# Patient Record
Sex: MALE | Race: BLACK OR AFRICAN AMERICAN | Employment: OTHER | URBAN - METROPOLITAN AREA
[De-identification: names, ages, dates, MRNs, and addresses within clinical notes are randomized per-mention and may not be internally consistent; named-entity substitution may affect disease eponyms.]

---

## 2023-08-10 ENCOUNTER — HOSPITAL ENCOUNTER (INPATIENT)
Facility: HOSPITAL | Age: 68
LOS: 18 days | DRG: 326 | End: 2023-08-28
Attending: STUDENT IN AN ORGANIZED HEALTH CARE EDUCATION/TRAINING PROGRAM | Admitting: FAMILY MEDICINE
Payer: MEDICARE

## 2023-08-10 ENCOUNTER — APPOINTMENT (OUTPATIENT)
Facility: HOSPITAL | Age: 68
End: 2023-08-10

## 2023-08-10 ENCOUNTER — HOSPITAL ENCOUNTER (EMERGENCY)
Facility: HOSPITAL | Age: 68
Discharge: ANOTHER HOSPITAL | End: 2023-08-10
Attending: EMERGENCY MEDICINE

## 2023-08-10 VITALS
WEIGHT: 110 LBS | BODY MASS INDEX: 14.9 KG/M2 | TEMPERATURE: 97.6 F | DIASTOLIC BLOOD PRESSURE: 74 MMHG | HEART RATE: 34 BPM | HEIGHT: 72 IN | SYSTOLIC BLOOD PRESSURE: 97 MMHG | OXYGEN SATURATION: 100 % | RESPIRATION RATE: 10 BRPM

## 2023-08-10 DIAGNOSIS — K29.60: Primary | ICD-10-CM

## 2023-08-10 DIAGNOSIS — E87.6 HYPOKALEMIA: ICD-10-CM

## 2023-08-10 LAB
ALBUMIN SERPL-MCNC: 2.7 G/DL (ref 3.5–5)
ALBUMIN/GLOB SERPL: 0.9 (ref 1.1–2.2)
ALP SERPL-CCNC: 62 U/L (ref 45–117)
ALT SERPL-CCNC: 43 U/L (ref 12–78)
ANION GAP SERPL CALC-SCNC: 6 MMOL/L (ref 5–15)
APPEARANCE UR: ABNORMAL
AST SERPL W P-5'-P-CCNC: 55 U/L (ref 15–37)
BACTERIA URNS QL MICRO: ABNORMAL /HPF
BASOPHILS # BLD: 0 K/UL (ref 0–0.1)
BASOPHILS NFR BLD: 0 % (ref 0–1)
BILIRUB SERPL-MCNC: 0.8 MG/DL (ref 0.2–1)
BILIRUB UR QL: NEGATIVE
BUN SERPL-MCNC: 56 MG/DL (ref 6–20)
BUN/CREAT SERPL: 57 (ref 12–20)
CA-I BLD-MCNC: 8.6 MG/DL (ref 8.5–10.1)
CHLORIDE SERPL-SCNC: 96 MMOL/L (ref 97–108)
CO2 SERPL-SCNC: 40 MMOL/L (ref 21–32)
COLOR UR: ABNORMAL
CREAT SERPL-MCNC: 0.99 MG/DL (ref 0.7–1.3)
DIFFERENTIAL METHOD BLD: ABNORMAL
EKG ATRIAL RATE: 45 BPM
EKG DIAGNOSIS: NORMAL
EKG P AXIS: 91 DEGREES
EKG P-R INTERVAL: 220 MS
EKG Q-T INTERVAL: 560 MS
EKG QRS DURATION: 166 MS
EKG QTC CALCULATION (BAZETT): 484 MS
EKG R AXIS: -63 DEGREES
EKG T AXIS: 76 DEGREES
EKG VENTRICULAR RATE: 45 BPM
EOSINOPHIL # BLD: 0 K/UL (ref 0–0.4)
EOSINOPHIL NFR BLD: 0 % (ref 0–7)
EPITH CASTS URNS QL MICRO: ABNORMAL /LPF
ERYTHROCYTE [DISTWIDTH] IN BLOOD BY AUTOMATED COUNT: 13.3 % (ref 11.5–14.5)
GLOBULIN SER CALC-MCNC: 3.1 G/DL (ref 2–4)
GLUCOSE SERPL-MCNC: 119 MG/DL (ref 65–100)
GLUCOSE UR STRIP.AUTO-MCNC: NEGATIVE MG/DL
HCT VFR BLD AUTO: 33.2 % (ref 36.6–50.3)
HGB BLD-MCNC: 11.5 G/DL (ref 12.1–17)
HGB UR QL STRIP: NEGATIVE
IMM GRANULOCYTES # BLD AUTO: 0 K/UL (ref 0–0.04)
IMM GRANULOCYTES NFR BLD AUTO: 1 % (ref 0–0.5)
KETONES UR QL STRIP.AUTO: NEGATIVE MG/DL
LEUKOCYTE ESTERASE UR QL STRIP.AUTO: NEGATIVE
LYMPHOCYTES # BLD: 1 K/UL (ref 0.8–3.5)
LYMPHOCYTES NFR BLD: 33 % (ref 12–49)
MAGNESIUM SERPL-MCNC: 2.3 MG/DL (ref 1.6–2.4)
MCH RBC QN AUTO: 36.1 PG (ref 26–34)
MCHC RBC AUTO-ENTMCNC: 34.6 G/DL (ref 30–36.5)
MCV RBC AUTO: 104.1 FL (ref 80–99)
MONOCYTES # BLD: 0.2 K/UL (ref 0–1)
MONOCYTES NFR BLD: 7 % (ref 5–13)
MUCOUS THREADS URNS QL MICRO: ABNORMAL /LPF
NEUTS SEG # BLD: 1.7 K/UL (ref 1.8–8)
NEUTS SEG NFR BLD: 59 % (ref 32–75)
NITRITE UR QL STRIP.AUTO: NEGATIVE
NRBC # BLD: 0 K/UL (ref 0–0.01)
NRBC BLD-RTO: 0 PER 100 WBC
OTHER, URINE: PRESENT
PH UR STRIP: 8 (ref 5–8)
PHOSPHATE SERPL-MCNC: 2.4 MG/DL (ref 2.6–4.7)
PLATELET # BLD AUTO: 180 K/UL (ref 150–400)
PMV BLD AUTO: 10.6 FL (ref 8.9–12.9)
POTASSIUM SERPL-SCNC: 2.2 MMOL/L (ref 3.5–5.1)
PROT SERPL-MCNC: 5.8 G/DL (ref 6.4–8.2)
PROT UR STRIP-MCNC: 100 MG/DL
RBC # BLD AUTO: 3.19 M/UL (ref 4.1–5.7)
RBC #/AREA URNS HPF: ABNORMAL /HPF (ref 0–5)
SARS-COV-2 RDRP RESP QL NAA+PROBE: NOT DETECTED
SODIUM SERPL-SCNC: 142 MMOL/L (ref 136–145)
SP GR UR REFRACTOMETRY: 1.02 (ref 1–1.03)
TROPONIN I SERPL HS-MCNC: 26 NG/L (ref 0–76)
URINE CULTURE IF INDICATED: ABNORMAL
UROBILINOGEN UR QL STRIP.AUTO: 0.1 EU/DL (ref 0.1–1)
WBC # BLD AUTO: 2.9 K/UL (ref 4.1–11.1)
WBC CASTS URNS QL MICRO: PRESENT
WBC URNS QL MICRO: ABNORMAL /HPF (ref 0–4)
YEAST URNS QL MICRO: PRESENT

## 2023-08-10 PROCEDURE — 84100 ASSAY OF PHOSPHORUS: CPT

## 2023-08-10 PROCEDURE — 6360000002 HC RX W HCPCS: Performed by: EMERGENCY MEDICINE

## 2023-08-10 PROCEDURE — 6360000004 HC RX CONTRAST MEDICATION: Performed by: EMERGENCY MEDICINE

## 2023-08-10 PROCEDURE — 96375 TX/PRO/DX INJ NEW DRUG ADDON: CPT

## 2023-08-10 PROCEDURE — 87086 URINE CULTURE/COLONY COUNT: CPT

## 2023-08-10 PROCEDURE — 80053 COMPREHEN METABOLIC PANEL: CPT

## 2023-08-10 PROCEDURE — 2060000000 HC ICU INTERMEDIATE R&B

## 2023-08-10 PROCEDURE — 81001 URINALYSIS AUTO W/SCOPE: CPT

## 2023-08-10 PROCEDURE — 96367 TX/PROPH/DG ADDL SEQ IV INF: CPT

## 2023-08-10 PROCEDURE — 2580000003 HC RX 258: Performed by: EMERGENCY MEDICINE

## 2023-08-10 PROCEDURE — 83735 ASSAY OF MAGNESIUM: CPT

## 2023-08-10 PROCEDURE — 85025 COMPLETE CBC W/AUTO DIFF WBC: CPT

## 2023-08-10 PROCEDURE — 84484 ASSAY OF TROPONIN QUANT: CPT

## 2023-08-10 PROCEDURE — 96365 THER/PROPH/DIAG IV INF INIT: CPT

## 2023-08-10 PROCEDURE — 6370000000 HC RX 637 (ALT 250 FOR IP): Performed by: EMERGENCY MEDICINE

## 2023-08-10 PROCEDURE — 71045 X-RAY EXAM CHEST 1 VIEW: CPT

## 2023-08-10 PROCEDURE — 99285 EMERGENCY DEPT VISIT HI MDM: CPT

## 2023-08-10 PROCEDURE — 96366 THER/PROPH/DIAG IV INF ADDON: CPT

## 2023-08-10 PROCEDURE — 87635 SARS-COV-2 COVID-19 AMP PRB: CPT

## 2023-08-10 PROCEDURE — 71260 CT THORAX DX C+: CPT

## 2023-08-10 PROCEDURE — 93005 ELECTROCARDIOGRAM TRACING: CPT | Performed by: EMERGENCY MEDICINE

## 2023-08-10 PROCEDURE — 36415 COLL VENOUS BLD VENIPUNCTURE: CPT

## 2023-08-10 RX ORDER — FENTANYL CITRATE 50 UG/ML
25 INJECTION, SOLUTION INTRAMUSCULAR; INTRAVENOUS ONCE
Status: COMPLETED | OUTPATIENT
Start: 2023-08-10 | End: 2023-08-10

## 2023-08-10 RX ORDER — KETOROLAC TROMETHAMINE 15 MG/ML
15 INJECTION, SOLUTION INTRAMUSCULAR; INTRAVENOUS
Status: COMPLETED | OUTPATIENT
Start: 2023-08-10 | End: 2023-08-10

## 2023-08-10 RX ORDER — ONDANSETRON 2 MG/ML
4 INJECTION INTRAMUSCULAR; INTRAVENOUS ONCE
Status: COMPLETED | OUTPATIENT
Start: 2023-08-10 | End: 2023-08-10

## 2023-08-10 RX ORDER — ONDANSETRON 2 MG/ML
4 INJECTION INTRAMUSCULAR; INTRAVENOUS ONCE
Status: DISCONTINUED | OUTPATIENT
Start: 2023-08-10 | End: 2023-08-10

## 2023-08-10 RX ORDER — PROCHLORPERAZINE EDISYLATE 5 MG/ML
10 INJECTION INTRAMUSCULAR; INTRAVENOUS
Status: COMPLETED | OUTPATIENT
Start: 2023-08-10 | End: 2023-08-10

## 2023-08-10 RX ORDER — 0.9 % SODIUM CHLORIDE 0.9 %
1000 INTRAVENOUS SOLUTION INTRAVENOUS ONCE
Status: COMPLETED | OUTPATIENT
Start: 2023-08-10 | End: 2023-08-10

## 2023-08-10 RX ORDER — POTASSIUM CHLORIDE 7.45 MG/ML
10 INJECTION INTRAVENOUS
Status: ACTIVE | OUTPATIENT
Start: 2023-08-10 | End: 2023-08-10

## 2023-08-10 RX ORDER — SODIUM CHLORIDE, SODIUM LACTATE, POTASSIUM CHLORIDE, AND CALCIUM CHLORIDE .6; .31; .03; .02 G/100ML; G/100ML; G/100ML; G/100ML
1000 INJECTION, SOLUTION INTRAVENOUS
Status: COMPLETED | OUTPATIENT
Start: 2023-08-10 | End: 2023-08-10

## 2023-08-10 RX ORDER — POTASSIUM CHLORIDE 750 MG/1
40 TABLET, FILM COATED, EXTENDED RELEASE ORAL ONCE
Status: COMPLETED | OUTPATIENT
Start: 2023-08-10 | End: 2023-08-10

## 2023-08-10 RX ADMIN — SODIUM CHLORIDE 1000 ML: 9 INJECTION, SOLUTION INTRAVENOUS at 10:34

## 2023-08-10 RX ADMIN — KETOROLAC TROMETHAMINE 15 MG: 15 INJECTION, SOLUTION INTRAMUSCULAR; INTRAVENOUS at 12:42

## 2023-08-10 RX ADMIN — VANCOMYCIN HYDROCHLORIDE 1250 MG: 1.25 INJECTION, POWDER, LYOPHILIZED, FOR SOLUTION INTRAVENOUS at 17:20

## 2023-08-10 RX ADMIN — ONDANSETRON 4 MG: 2 INJECTION INTRAMUSCULAR; INTRAVENOUS at 12:42

## 2023-08-10 RX ADMIN — POTASSIUM CHLORIDE 10 MEQ: 7.45 INJECTION INTRAVENOUS at 19:10

## 2023-08-10 RX ADMIN — PROCHLORPERAZINE EDISYLATE 10 MG: 5 INJECTION INTRAMUSCULAR; INTRAVENOUS at 14:34

## 2023-08-10 RX ADMIN — SODIUM CHLORIDE, POTASSIUM CHLORIDE, SODIUM LACTATE AND CALCIUM CHLORIDE 1000 ML: 600; 310; 30; 20 INJECTION, SOLUTION INTRAVENOUS at 12:42

## 2023-08-10 RX ADMIN — POTASSIUM CHLORIDE 10 MEQ: 7.45 INJECTION INTRAVENOUS at 16:55

## 2023-08-10 RX ADMIN — POTASSIUM CHLORIDE 10 MEQ: 7.45 INJECTION INTRAVENOUS at 14:40

## 2023-08-10 RX ADMIN — POTASSIUM CHLORIDE 10 MEQ: 7.45 INJECTION INTRAVENOUS at 20:26

## 2023-08-10 RX ADMIN — PIPERACILLIN AND TAZOBACTAM 4500 MG: 4; .5 INJECTION, POWDER, LYOPHILIZED, FOR SOLUTION INTRAVENOUS at 16:06

## 2023-08-10 RX ADMIN — FENTANYL CITRATE 25 MCG: 50 INJECTION, SOLUTION INTRAMUSCULAR; INTRAVENOUS at 19:15

## 2023-08-10 RX ADMIN — POTASSIUM CHLORIDE 40 MEQ: 750 TABLET, EXTENDED RELEASE ORAL at 14:38

## 2023-08-10 RX ADMIN — IOPAMIDOL 100 ML: 755 INJECTION, SOLUTION INTRAVENOUS at 14:26

## 2023-08-10 RX ADMIN — POTASSIUM CHLORIDE 10 MEQ: 7.45 INJECTION INTRAVENOUS at 15:45

## 2023-08-10 ASSESSMENT — PAIN SCALES - GENERAL
PAINLEVEL_OUTOF10: 3
PAINLEVEL_OUTOF10: 0

## 2023-08-10 ASSESSMENT — PAIN - FUNCTIONAL ASSESSMENT: PAIN_FUNCTIONAL_ASSESSMENT: 0-10

## 2023-08-10 ASSESSMENT — LIFESTYLE VARIABLES
HOW OFTEN DO YOU HAVE A DRINK CONTAINING ALCOHOL: NEVER
HOW MANY STANDARD DRINKS CONTAINING ALCOHOL DO YOU HAVE ON A TYPICAL DAY: PATIENT DOES NOT DRINK

## 2023-08-10 NOTE — ED NOTES
NG tube placed at this time. Secured to the nose.  Marked 64 at the nose     Kristal Briones RN  08/10/23 1817       Kristal Briones RN  08/10/23 9698

## 2023-08-10 NOTE — ED TRIAGE NOTES
Pt complaining of vomiting that started about a month ago. Patient family states he hasn't been able to eat or drink anything for a month.  [facility]

## 2023-08-10 NOTE — ED PROVIDER NOTES
Teagan Burt MD        sodium chloride flush 0.9 % injection 5-40 mL  5-40 mL IntraVENous CARLOS Castañeda MD        0.9 % sodium chloride infusion   IntraVENous CARLOS Castañeda MD        ondansetron (ZOFRAN-ODT) disintegrating tablet 4 mg  4 mg Oral Q8H PRN Bhumi Castañeda MD        Or    ondansetron [facility]) injection 4 mg  4 mg IntraVENous Q6H PRN Bhumi Castañeda MD        polyethylene glycol (GLYCOLAX) packet 17 g  17 g Oral Daily PRN Bhumi Castañeda MD        acetaminophen (TYLENOL) tablet 650 mg  650 mg Oral Q6H PRN Bhumi Castañeda MD        Or    acetaminophen (TYLENOL) suppository 650 mg  650 mg Rectal Q6H PRN Bhumi Castañeda MD        HYDROmorphone HCl PF (DILAUDID) injection 0.5 mg  0.5 mg IntraVENous Q4H PRN Bhumi MD Garry        naloxone Miri Horace) injection 0.4 mg  0.4 mg IntraVENous PRN Bhumi MD Garry           Social Determinants of Health:   Social Determinants of Health     Tobacco Use: Not on file   Alcohol Use: Not At Risk    Frequency of Alcohol Consumption: Never    Average Number of Drinks: Patient does not drink    Frequency of Binge Drinking: Never   Financial Resource Strain: Not on file   Food Insecurity: Not on file   Transportation Needs: Not on file   Physical Activity: Not on file   Stress: Not on file   Social Connections: Not on file   Intimate Partner Violence: Not on file   Depression: Not on file   Housing Stability: Not on file       PHYSICAL EXAM   Physical Exam  Constitutional:       Appearance: He is cachectic. He is ill-appearing. HENT:      Head: Normocephalic and atraumatic. Right Ear: External ear normal.      Left Ear: External ear normal.      Nose: Nose normal.      Mouth/Throat:      Mouth: Mucous membranes are moist.   Eyes:      Extraocular Movements: Extraocular movements intact. Pupils: Pupils are equal, round, and reactive to light. Cardiovascular:      Rate and Rhythm: Normal rate and regular rhythm. Pulses: Normal pulses.       Heart

## 2023-08-11 PROBLEM — K29.60: Status: ACTIVE | Noted: 2023-08-11

## 2023-08-11 LAB
ANION GAP SERPL CALC-SCNC: 5 MMOL/L (ref 5–15)
BASOPHILS # BLD: 0 K/UL (ref 0–0.1)
BASOPHILS NFR BLD: 0 % (ref 0–1)
BUN SERPL-MCNC: 42 MG/DL (ref 6–20)
BUN/CREAT SERPL: 54 (ref 12–20)
CALCIUM SERPL-MCNC: 8.2 MG/DL (ref 8.5–10.1)
CHLORIDE SERPL-SCNC: 102 MMOL/L (ref 97–108)
CO2 SERPL-SCNC: 35 MMOL/L (ref 21–32)
CREAT SERPL-MCNC: 0.78 MG/DL (ref 0.7–1.3)
DIFFERENTIAL METHOD BLD: ABNORMAL
EOSINOPHIL # BLD: 0 K/UL (ref 0–0.4)
EOSINOPHIL NFR BLD: 0 % (ref 0–7)
ERYTHROCYTE [DISTWIDTH] IN BLOOD BY AUTOMATED COUNT: 13.4 % (ref 11.5–14.5)
GLUCOSE SERPL-MCNC: 86 MG/DL (ref 65–100)
HCT VFR BLD AUTO: 33.7 % (ref 36.6–50.3)
HGB BLD-MCNC: 11.5 G/DL (ref 12.1–17)
IMM GRANULOCYTES # BLD AUTO: 0 K/UL (ref 0–0.04)
IMM GRANULOCYTES NFR BLD AUTO: 1 % (ref 0–0.5)
LACTATE SERPL-SCNC: 2.4 MMOL/L (ref 0.4–2)
LYMPHOCYTES # BLD: 0.9 K/UL (ref 0.8–3.5)
LYMPHOCYTES NFR BLD: 27 % (ref 12–49)
MAGNESIUM SERPL-MCNC: 2.1 MG/DL (ref 1.6–2.4)
MCH RBC QN AUTO: 35.8 PG (ref 26–34)
MCHC RBC AUTO-ENTMCNC: 34.1 G/DL (ref 30–36.5)
MCV RBC AUTO: 105 FL (ref 80–99)
MONOCYTES # BLD: 0.3 K/UL (ref 0–1)
MONOCYTES NFR BLD: 8 % (ref 5–13)
NEUTS SEG # BLD: 2.2 K/UL (ref 1.8–8)
NEUTS SEG NFR BLD: 64 % (ref 32–75)
NRBC # BLD: 0 K/UL (ref 0–0.01)
NRBC BLD-RTO: 0 PER 100 WBC
PLATELET # BLD AUTO: 162 K/UL (ref 150–400)
PMV BLD AUTO: 10.3 FL (ref 8.9–12.9)
POTASSIUM SERPL-SCNC: 2.9 MMOL/L (ref 3.5–5.1)
RBC # BLD AUTO: 3.21 M/UL (ref 4.1–5.7)
SODIUM SERPL-SCNC: 142 MMOL/L (ref 136–145)
WBC # BLD AUTO: 3.4 K/UL (ref 4.1–11.1)

## 2023-08-11 PROCEDURE — 6360000002 HC RX W HCPCS: Performed by: INTERNAL MEDICINE

## 2023-08-11 PROCEDURE — 85025 COMPLETE CBC W/AUTO DIFF WBC: CPT

## 2023-08-11 PROCEDURE — 2580000003 HC RX 258: Performed by: PHYSICIAN ASSISTANT

## 2023-08-11 PROCEDURE — 2060000000 HC ICU INTERMEDIATE R&B

## 2023-08-11 PROCEDURE — 6360000002 HC RX W HCPCS: Performed by: PHYSICIAN ASSISTANT

## 2023-08-11 PROCEDURE — 6360000002 HC RX W HCPCS: Performed by: FAMILY MEDICINE

## 2023-08-11 PROCEDURE — APPSS30 APP SPLIT SHARED TIME 16-30 MINUTES: Performed by: PHYSICIAN ASSISTANT

## 2023-08-11 PROCEDURE — 80048 BASIC METABOLIC PNL TOTAL CA: CPT

## 2023-08-11 PROCEDURE — 99221 1ST HOSP IP/OBS SF/LOW 40: CPT | Performed by: SURGERY

## 2023-08-11 PROCEDURE — 6360000002 HC RX W HCPCS: Performed by: SURGERY

## 2023-08-11 PROCEDURE — 83735 ASSAY OF MAGNESIUM: CPT

## 2023-08-11 PROCEDURE — 36415 COLL VENOUS BLD VENIPUNCTURE: CPT

## 2023-08-11 PROCEDURE — C9113 INJ PANTOPRAZOLE SODIUM, VIA: HCPCS | Performed by: PHYSICIAN ASSISTANT

## 2023-08-11 PROCEDURE — 2580000003 HC RX 258: Performed by: INTERNAL MEDICINE

## 2023-08-11 PROCEDURE — A4216 STERILE WATER/SALINE, 10 ML: HCPCS | Performed by: PHYSICIAN ASSISTANT

## 2023-08-11 PROCEDURE — 83605 ASSAY OF LACTIC ACID: CPT

## 2023-08-11 PROCEDURE — 2580000003 HC RX 258: Performed by: FAMILY MEDICINE

## 2023-08-11 RX ORDER — TADALAFIL 10 MG/1
10 TABLET ORAL PRN
COMMUNITY

## 2023-08-11 RX ORDER — ONDANSETRON 4 MG/1
4 TABLET, ORALLY DISINTEGRATING ORAL EVERY 8 HOURS PRN
Status: DISCONTINUED | OUTPATIENT
Start: 2023-08-11 | End: 2023-08-13

## 2023-08-11 RX ORDER — AMLODIPINE BESYLATE 10 MG/1
10 TABLET ORAL DAILY
COMMUNITY

## 2023-08-11 RX ORDER — ACETAMINOPHEN 650 MG/1
650 SUPPOSITORY RECTAL EVERY 6 HOURS PRN
Status: DISCONTINUED | OUTPATIENT
Start: 2023-08-11 | End: 2023-08-13

## 2023-08-11 RX ORDER — SODIUM CHLORIDE 0.9 % (FLUSH) 0.9 %
5-40 SYRINGE (ML) INJECTION EVERY 12 HOURS SCHEDULED
Status: DISCONTINUED | OUTPATIENT
Start: 2023-08-11 | End: 2023-08-28 | Stop reason: HOSPADM

## 2023-08-11 RX ORDER — SIMETHICONE 80 MG
80 TABLET,CHEWABLE ORAL EVERY 6 HOURS PRN
COMMUNITY

## 2023-08-11 RX ORDER — ACETAMINOPHEN 325 MG/1
650 TABLET ORAL EVERY 6 HOURS PRN
Status: DISCONTINUED | OUTPATIENT
Start: 2023-08-11 | End: 2023-08-13

## 2023-08-11 RX ORDER — POLYETHYLENE GLYCOL 3350 17 G/17G
17 POWDER, FOR SOLUTION ORAL DAILY PRN
Status: DISCONTINUED | OUTPATIENT
Start: 2023-08-11 | End: 2023-08-23

## 2023-08-11 RX ORDER — SODIUM CHLORIDE 0.9 % (FLUSH) 0.9 %
5-40 SYRINGE (ML) INJECTION PRN
Status: DISCONTINUED | OUTPATIENT
Start: 2023-08-11 | End: 2023-08-28 | Stop reason: HOSPADM

## 2023-08-11 RX ORDER — HYDROMORPHONE HYDROCHLORIDE 1 MG/ML
1 INJECTION, SOLUTION INTRAMUSCULAR; INTRAVENOUS; SUBCUTANEOUS
Status: DISCONTINUED | OUTPATIENT
Start: 2023-08-11 | End: 2023-08-23

## 2023-08-11 RX ORDER — SODIUM CHLORIDE AND POTASSIUM CHLORIDE 300; 900 MG/100ML; MG/100ML
INJECTION, SOLUTION INTRAVENOUS CONTINUOUS
Status: DISCONTINUED | OUTPATIENT
Start: 2023-08-11 | End: 2023-08-13

## 2023-08-11 RX ORDER — NALOXONE HYDROCHLORIDE 0.4 MG/ML
0.4 INJECTION, SOLUTION INTRAMUSCULAR; INTRAVENOUS; SUBCUTANEOUS PRN
Status: DISCONTINUED | OUTPATIENT
Start: 2023-08-11 | End: 2023-08-28 | Stop reason: HOSPADM

## 2023-08-11 RX ORDER — HYDROMORPHONE HYDROCHLORIDE 1 MG/ML
1 INJECTION, SOLUTION INTRAMUSCULAR; INTRAVENOUS; SUBCUTANEOUS EVERY 4 HOURS PRN
Status: DISCONTINUED | OUTPATIENT
Start: 2023-08-11 | End: 2023-08-11

## 2023-08-11 RX ORDER — TAMSULOSIN HYDROCHLORIDE 0.4 MG/1
0.4 CAPSULE ORAL 2 TIMES DAILY
COMMUNITY

## 2023-08-11 RX ORDER — ONDANSETRON 2 MG/ML
4 INJECTION INTRAMUSCULAR; INTRAVENOUS EVERY 6 HOURS PRN
Status: DISCONTINUED | OUTPATIENT
Start: 2023-08-11 | End: 2023-08-13

## 2023-08-11 RX ORDER — SODIUM CHLORIDE 9 MG/ML
INJECTION, SOLUTION INTRAVENOUS PRN
Status: DISCONTINUED | OUTPATIENT
Start: 2023-08-11 | End: 2023-08-13 | Stop reason: SDUPTHER

## 2023-08-11 RX ORDER — HYDROMORPHONE HYDROCHLORIDE 1 MG/ML
0.5 INJECTION, SOLUTION INTRAMUSCULAR; INTRAVENOUS; SUBCUTANEOUS EVERY 4 HOURS PRN
Status: DISCONTINUED | OUTPATIENT
Start: 2023-08-11 | End: 2023-08-11

## 2023-08-11 RX ORDER — PANTOPRAZOLE SODIUM 40 MG/1
40 TABLET, DELAYED RELEASE ORAL DAILY
COMMUNITY

## 2023-08-11 RX ADMIN — PANTOPRAZOLE SODIUM 40 MG: 40 INJECTION, POWDER, LYOPHILIZED, FOR SOLUTION INTRAVENOUS at 23:42

## 2023-08-11 RX ADMIN — HYDROMORPHONE HYDROCHLORIDE 1 MG: 1 INJECTION, SOLUTION INTRAMUSCULAR; INTRAVENOUS; SUBCUTANEOUS at 19:09

## 2023-08-11 RX ADMIN — HYDROMORPHONE HYDROCHLORIDE 0.5 MG: 1 INJECTION, SOLUTION INTRAMUSCULAR; INTRAVENOUS; SUBCUTANEOUS at 08:23

## 2023-08-11 RX ADMIN — HYDROMORPHONE HYDROCHLORIDE 1 MG: 1 INJECTION, SOLUTION INTRAMUSCULAR; INTRAVENOUS; SUBCUTANEOUS at 16:44

## 2023-08-11 RX ADMIN — POTASSIUM CHLORIDE AND SODIUM CHLORIDE: 900; 300 INJECTION, SOLUTION INTRAVENOUS at 19:09

## 2023-08-11 RX ADMIN — PIPERACILLIN AND TAZOBACTAM 3375 MG: 3; .375 INJECTION, POWDER, LYOPHILIZED, FOR SOLUTION INTRAVENOUS at 08:32

## 2023-08-11 RX ADMIN — PIPERACILLIN AND TAZOBACTAM 3375 MG: 3; .375 INJECTION, POWDER, LYOPHILIZED, FOR SOLUTION INTRAVENOUS at 23:42

## 2023-08-11 RX ADMIN — PIPERACILLIN AND TAZOBACTAM 3375 MG: 3; .375 INJECTION, POWDER, LYOPHILIZED, FOR SOLUTION INTRAVENOUS at 16:44

## 2023-08-11 RX ADMIN — PANTOPRAZOLE SODIUM 40 MG: 40 INJECTION, POWDER, LYOPHILIZED, FOR SOLUTION INTRAVENOUS at 13:02

## 2023-08-11 RX ADMIN — HYDROMORPHONE HYDROCHLORIDE 1 MG: 1 INJECTION, SOLUTION INTRAMUSCULAR; INTRAVENOUS; SUBCUTANEOUS at 13:02

## 2023-08-11 RX ADMIN — SODIUM CHLORIDE, PRESERVATIVE FREE 10 ML: 5 INJECTION INTRAVENOUS at 21:05

## 2023-08-11 RX ADMIN — SODIUM CHLORIDE, PRESERVATIVE FREE 10 ML: 5 INJECTION INTRAVENOUS at 08:24

## 2023-08-11 RX ADMIN — ONDANSETRON 4 MG: 2 INJECTION INTRAMUSCULAR; INTRAVENOUS at 13:02

## 2023-08-11 ASSESSMENT — PAIN DESCRIPTION - DESCRIPTORS
DESCRIPTORS: ACHING
DESCRIPTORS: ACHING;TENDER;SORE

## 2023-08-11 ASSESSMENT — PAIN DESCRIPTION - LOCATION
LOCATION: ABDOMEN

## 2023-08-11 ASSESSMENT — PAIN SCALES - GENERAL
PAINLEVEL_OUTOF10: 7
PAINLEVEL_OUTOF10: 5
PAINLEVEL_OUTOF10: 0
PAINLEVEL_OUTOF10: 6
PAINLEVEL_OUTOF10: 0

## 2023-08-11 ASSESSMENT — PAIN DESCRIPTION - ORIENTATION
ORIENTATION: MID
ORIENTATION: LEFT;UPPER
ORIENTATION: LEFT

## 2023-08-11 NOTE — CONSULTS
[address] [address]        GASTROENTEROLOGY CONSULTATION NOTE  Will Claudell Clunes  [phone] office  [phone] NP/PA in-hospital cell phone M-F until 4:30PM  After 5PM or on weekends, please call  for physician on call        NAME:  Regina Perry   :   1955   MRN:   [id number]       Referring Physician: Dr. Claudia Moritz Date: 2023 10:56 AM    Chief Complaint: nausea/vomiting, abdominal pain, and weight loss     History of Present Illness:  Patient is a 77 y.o. who is seen in consultation at the request of Dr. Cruz [place] for gastric pneumatosis. Patient has a past medical history significant for lung cancer, prostate cancer, hypertension, HCV, and below. Patient reports nausea/vomiting, abdominal pain, and weight loss since 3/2023. He reports a 60 lb weight loss. He reports that he has been unable to tolerate PO intake. He has been drinking Ensure, although reports also vomiting. No clear hematemesis. Pain is concentrated to the LUQ. No change in bowel habits, melena, or hematochezia. Denies fevers or chills. Patient reports having been admitted at the [facility] last month at which time he had an EGD. Denies NSAID use. Denies anticoagulation. Denies alcohol or tobacco use for the last 2-3 years. Occasional marijuana use. Denies history of abdominal surgeries. Colonoscopy reportedly in  at the [facility]. I have reviewed the emergency room note, hospital admission note, notes by all other clinicians who have seen the patient during this hospitalization to date. I have reviewed the problem list and the reason for this hospitalization. I have reviewed the allergies and the medications the patient was taking at home prior to this hospitalization.     PMH:  Past Medical History:   Diagnosis Date    BPH (benign prostatic hyperplasia)     Cannabis dependence in remission ([address])     Cocaine dependence in remission ([address])

## 2023-08-11 NOTE — CONSULTS
[facility] consult    Admit Date: 8/10/2023  Reason for Consultation: Gastric emphysema    HPI:  Demario Gabriel is a 77 y.o. male with PMH of lung cancer, prostate cancer, HTN, Hep C & as noted below who we are asked to see in General surgery consultation for gastric emphysema. Patient states that he began with vomiting in March 2023, associated with 60# weight loss since that time. He is not able to eat solid food. He is tries to drink Ensure shakes but burps and then spits those up too. He denies abdominal pain. No fevers or chills. Passing flatus, last BM 3-4 days ago. Abdominal surgical history +for hernia repair. CT C/A/P w contrast (8/10/2023): IMPRESSION:  1. Massive gastric distention with findings suspicious for gastric wall emphysema. Findings are concerning for ischemia. There is also an extensive amount of portal venous gas of the liver. 2. Status post LEFT upper lobectomy. 3. Severe emphysematous changes of lungs. 4. Small volume of ascites in the abdomen and pelvis. Patient Active Problem List    Diagnosis Date Noted    Gastric emphysema 08/11/2023     Past Medical History:   Diagnosis Date    BPH (benign prostatic hyperplasia)     Cannabis dependence in remission ([address])     Cocaine dependence in remission ([address])     Depression     Erectile dysfunction     GERD (gastroesophageal reflux disease)     Hepatitis C virus     HTN (hypertension)     Lung cancer (HCC)     Polysubstance abuse ([address])     Primary open angle glaucoma     Prostate cancer ([address])     Unilateral primary osteoarthritis, right knee       No past surgical history on file. Social History     Tobacco Use    Smoking status: Not on file    Smokeless tobacco: Not on file   Substance Use Topics    Alcohol use: Not on file      No family history on file. Prior to Admission medications    Medication Sig Start Date End Date Taking?  Authorizing Provider   amLODIPine (NORVASC) 10 MG tablet Take 1 tablet by mouth daily

## 2023-08-11 NOTE — CONSULTS
Patient seen at request of Dr. Cher Huerta. Information obtained from patient and review of chart. Angeli Doctor is an 77 y.o. male who was recently admitted with abdominal pain. Mr. Leila Ventura tells me that he has been experiencing abdominal pain and associated nausea and vomiting for several months now. No clear h/o hematemesis but Mr. Leila Ventura does give a vague h/o coffee ground emesis. Denies NSAID use. No associated fevers or chills. Last bowel movement was several days ago. Mr. Leila Ventura also reports decreased po intake and has lost approximately 60 pounds over the past several months. Furthermore, Mr. Leila Ventura is s/p resection of lung cancer in March, 20223 and has not received adjuvant therapy. He has otherwise been in his usual state of health. CT scan chest/abdomen/pelvis with IV contrast - 8/10/2023 - Massive gastric distention with findings suspicious for gastric wall emphysema. Findings are concerning for ischemia. There is also an extensive amount of portal venous gas of the liver. Status post LEFT upper lobectomy. Severe emphysematous changes of lungs. Small volume of ascites in the abdomen and pelvis. Allergies - Patient has no known allergies. Meds - Reviewed. PMH -   Past Medical History:   Diagnosis Date    BPH (benign prostatic hyperplasia)     Cannabis dependence in remission ([address])     Cocaine dependence in remission ([address])     Depression     Erectile dysfunction     GERD (gastroesophageal reflux disease)     Hepatitis C virus     HTN (hypertension)     Lung cancer (HCC)     Polysubstance abuse ([address])     Primary open angle glaucoma     Prostate cancer ([address])     Unilateral primary osteoarthritis, right knee        PSH - No past surgical history on file. Fam Hx - No family history on file.     Soc Hx -   Social History     Tobacco Use    Smoking status: Not on file    Smokeless tobacco: Not on file   Substance Use Topics    Alcohol use: Not on file     The patient is a cachectic man

## 2023-08-11 NOTE — PROGRESS NOTES
Comprehensive Nutrition Assessment    Type and Reason for Visit: Initial (low BMI)    Nutrition Recommendations/Plan:     Recommend replete K+  Recommend start IVFs with NPO status  If unable to advance diet within next 72 hours, consider PPN vs TPN  Pt would be a significant refeeding risk. If started over weekend, would not exceed 150 gm dex. Start thiamine, folic acid. Monitor and replete Phos, K+, Mg         Malnutrition Assessment:  Malnutrition Status:  Severe malnutrition (08/11/23 1522)    Context:  Chronic Illness     Findings of the 6 clinical characteristics of malnutrition:  Energy Intake:  75% or less estimated energy requirements for 1 month or longer  Weight Loss:  Greater than 10% over 6 months     Body Fat Loss:  Severe body fat loss Triceps, Fat Overlying Ribs   Muscle Mass Loss:  Severe muscle mass loss Temples (temporalis), Clavicles (pectoralis & deltoids), Calf (gastrocnemius), Hand (interosseous)  Fluid Accumulation:  No significant fluid accumulation     Strength:  Not Performed       Nutrition Assessment:    Past Medical History:   Diagnosis Date    BPH (benign prostatic hyperplasia)     Cannabis dependence in remission ([address])     Cocaine dependence in remission ([address])     Depression     Erectile dysfunction     GERD (gastroesophageal reflux disease)     Hepatitis C virus     HTN (hypertension)     Lung cancer (HCC)     Polysubstance abuse ([address])     Primary open angle glaucoma     Prostate cancer (HCC)     Unilateral primary osteoarthritis, right knee      Pt admitted with Gastric emphysema. GI and surgery consulted. GI work-up deferred for now, recommends plans per surgery. Per surgery \"Patient states that he began with vomiting in March 2023, associated with 60# weight loss since that time. He is not able to eat solid food. He is tries to drink Ensure shakes but burps and then spits those up too. \"  Pt screened for low BMI. Spoke with RN.   FRANKIE in place, not hooked to suction d/t not

## 2023-08-11 NOTE — PROGRESS NOTES
Bedside shift change report given to Yfn Jay RN (oncoming nurse) by AYESHA Dale (offgoing nurse). Report included the following information Nurse Handoff Report, Index, Intake/Output, MAR, Recent Results, and Cardiac Rhythm Sinus braulio .

## 2023-08-11 NOTE — PROGRESS NOTES
[address] [facility]  Hospitalist Group                                                                                          Hospitalist Progress Note  Yenifer Sesay MD  Answering service: [phone] OR [phone] from in house phone        Date of Service:  2023  NAME:  Radha Poole  :  1955  MRN:  [id number]       Admission Summary:   Patient is a 78 yo M who was transferred to [place] from [facility] for management of gastric emphysema. Interval history / Subjective:   Patient feels slightly better. The pain / discomfort is better in the left upper quadrant and it does not feel as distended. He has an NGT in place. Abdomen is soft and benign on examination. He denies any nausea or vomiting. Assessment & Plan:        Gastric emphysema:  - patient presents with abdominal pain, distension, inability to tolerate PO, significant weight loss;   - possible etiology is ischemia, infection less likely;   - keep patient NPO;  - IVFs;   - IV PPI;  - Surgery consult;   - GI consult;   - empiric IV Abx: Zosyn;   - monitor closely, as patient is at high risk of further decline; he was instructed to alert the nursing staff if his pain worsens; Severe protein calorie malnutrition:  - progressive weight loss over the past few months - cancer and inability to tolerate PO;   - at risk of further weight loss if unable to tolerate PO;   - discussed with Dietician: patient at high surgical risk, keep NPO for now, if needs to remain NPO for longer than 2-3 days, will initiate IV nutrition (TPN vs PPN);  - at high risk of refeeding syndrome; History of lung cancer:   - patient states he was diagnosed with  lung cancer a few months ago and underwent surgery in March of this year at [facility];   - CT chest/abdo/pelvis 8/10:   There are diffuse emphysematous changes of the lungs. Patient is status  post LEFT upper lobectomy. No lung consolidation is identified. Reviewed:     Current Facility-Administered Medications   Medication Dose Route Frequency    sodium chloride flush 0.9 % injection 5-40 mL  5-40 mL IntraVENous 2 times per day    sodium chloride flush 0.9 % injection 5-40 mL  5-40 mL IntraVENous PRN    0.9 % sodium chloride infusion   IntraVENous PRN    ondansetron (ZOFRAN-ODT) disintegrating tablet 4 mg  4 mg Oral Q8H PRN    Or    ondansetron (ZOFRAN) injection 4 mg  4 mg IntraVENous Q6H PRN    polyethylene glycol (GLYCOLAX) packet 17 g  17 g Oral Daily PRN    acetaminophen (TYLENOL) tablet 650 mg  650 mg Oral Q6H PRN    Or    acetaminophen (TYLENOL) suppository 650 mg  650 mg Rectal Q6H PRN    naloxone (NARCAN) injection 0.4 mg  0.4 mg IntraVENous PRN    piperacillin-tazobactam (ZOSYN) 3,375 mg in sodium chloride 0.9 % 50 mL IVPB (mini-bag)  3,375 mg IntraVENous q8h    pantoprazole (PROTONIX) 40 mg in sodium chloride (PF) 0.9 % 10 mL injection  40 mg IntraVENous Q12H    HYDROmorphone HCl PF (DILAUDID) injection 1 mg  1 mg IntraVENous Q4H PRN     ______________________________________________________________________  EXPECTED LENGTH OF STAY: Unable to retrieve estimated LOS  ACTUAL LENGTH OF STAY:          1                 Zuleika Mcfarlane MD

## 2023-08-11 NOTE — PLAN OF CARE
Problem: Discharge Planning  Goal: Discharge to home or other facility with appropriate resources  Outcome: Progressing  Flowsheets (Taken 8/11/2023 8434)  Discharge to home or other facility with appropriate resources:   Identify barriers to discharge with patient and caregiver   Arrange for needed discharge resources and transportation as appropriate   Identify discharge learning needs (meds, wound care, etc)     Problem: Skin/Tissue Integrity  Goal: Absence of new skin breakdown  Description: 1. Monitor for areas of redness and/or skin breakdown  2. Assess vascular access sites hourly  3. Every 4-6 hours minimum:  Change oxygen saturation probe site  4. Every 4-6 hours:  If on nasal continuous positive airway pressure, respiratory therapy assess nares and determine need for appliance change or resting period.   Outcome: Progressing     Problem: Safety - Adult  Goal: Free from fall injury  Outcome: Progressing     Problem: Cardiovascular - Adult  Goal: Maintains optimal cardiac output and hemodynamic stability  Outcome: Progressing  Flowsheets (Taken 8/11/2023 0832)  Maintains optimal cardiac output and hemodynamic stability:   Monitor blood pressure and heart rate   Monitor urine output and notify Licensed Independent Practitioner for values outside of normal range   Assess for signs of decreased cardiac output  Goal: Absence of cardiac dysrhythmias or at baseline  Outcome: Progressing  Flowsheets (Taken 8/11/2023 0832)  Absence of cardiac dysrhythmias or at baseline:   Monitor cardiac rate and rhythm   Assess for signs of decreased cardiac output     Problem: Gastrointestinal - Adult  Goal: Minimal or absence of nausea and vomiting  Outcome: Progressing  Flowsheets (Taken 8/11/2023 0832)  Minimal or absence of nausea and vomiting:   Administer IV fluids as ordered to ensure adequate hydration   Maintain NPO status until nausea and vomiting are resolved   Administer ordered antiemetic medications as needed

## 2023-08-11 NOTE — ED NOTES
Pt accepted to [facility]. Phone number [phone]. Nurse to Nurse report given to [facility]. Zechariah Lira transporting patient, arrived at this time.       Vinod Dickey RN  08/10/23 0274

## 2023-08-12 LAB
ALBUMIN SERPL-MCNC: 2.6 G/DL (ref 3.5–5)
ALBUMIN/GLOB SERPL: 1 (ref 1.1–2.2)
ALP SERPL-CCNC: 60 U/L (ref 45–117)
ALT SERPL-CCNC: 46 U/L (ref 12–78)
ANION GAP SERPL CALC-SCNC: 7 MMOL/L (ref 5–15)
AST SERPL-CCNC: 59 U/L (ref 15–37)
BACTERIA SPEC CULT: NORMAL
BASOPHILS # BLD: 0 K/UL (ref 0–0.1)
BASOPHILS NFR BLD: 0 % (ref 0–1)
BILIRUB SERPL-MCNC: 0.9 MG/DL (ref 0.2–1)
BUN SERPL-MCNC: 39 MG/DL (ref 6–20)
BUN/CREAT SERPL: 55 (ref 12–20)
CALCIUM SERPL-MCNC: 8.2 MG/DL (ref 8.5–10.1)
CHLORIDE SERPL-SCNC: 109 MMOL/L (ref 97–108)
CO2 SERPL-SCNC: 30 MMOL/L (ref 21–32)
COLONY COUNT, CNT: NORMAL
CREAT SERPL-MCNC: 0.71 MG/DL (ref 0.7–1.3)
DIFFERENTIAL METHOD BLD: ABNORMAL
EOSINOPHIL # BLD: 0 K/UL (ref 0–0.4)
EOSINOPHIL NFR BLD: 0 % (ref 0–7)
ERYTHROCYTE [DISTWIDTH] IN BLOOD BY AUTOMATED COUNT: 13.5 % (ref 11.5–14.5)
EST. AVERAGE GLUCOSE BLD GHB EST-MCNC: 100 MG/DL
FERRITIN SERPL-MCNC: 745 NG/ML (ref 26–388)
FOLATE SERPL-MCNC: 29.1 NG/ML (ref 5–21)
GLOBULIN SER CALC-MCNC: 2.5 G/DL (ref 2–4)
GLUCOSE SERPL-MCNC: 57 MG/DL (ref 65–100)
HBA1C MFR BLD: 5.1 % (ref 4–5.6)
HCT VFR BLD AUTO: 33.3 % (ref 36.6–50.3)
HGB BLD-MCNC: 10.9 G/DL (ref 12.1–17)
IMM GRANULOCYTES # BLD AUTO: 0 K/UL (ref 0–0.04)
IMM GRANULOCYTES NFR BLD AUTO: 0 % (ref 0–0.5)
IRON SATN MFR SERPL: 66 % (ref 20–50)
IRON SERPL-MCNC: 81 UG/DL (ref 35–150)
LYMPHOCYTES # BLD: 0.9 K/UL (ref 0.8–3.5)
LYMPHOCYTES NFR BLD: 29 % (ref 12–49)
Lab: NORMAL
MAGNESIUM SERPL-MCNC: 2 MG/DL (ref 1.6–2.4)
MCH RBC QN AUTO: 35.3 PG (ref 26–34)
MCHC RBC AUTO-ENTMCNC: 32.7 G/DL (ref 30–36.5)
MCV RBC AUTO: 107.8 FL (ref 80–99)
MONOCYTES # BLD: 0.3 K/UL (ref 0–1)
MONOCYTES NFR BLD: 8 % (ref 5–13)
NEUTS SEG # BLD: 1.9 K/UL (ref 1.8–8)
NEUTS SEG NFR BLD: 63 % (ref 32–75)
NRBC # BLD: 0 K/UL (ref 0–0.01)
NRBC BLD-RTO: 0 PER 100 WBC
PHOSPHATE SERPL-MCNC: 2.5 MG/DL (ref 2.6–4.7)
PHOSPHATE SERPL-MCNC: 2.7 MG/DL (ref 2.6–4.7)
PLATELET # BLD AUTO: 143 K/UL (ref 150–400)
PMV BLD AUTO: 10.1 FL (ref 8.9–12.9)
POTASSIUM SERPL-SCNC: 3.2 MMOL/L (ref 3.5–5.1)
PROT SERPL-MCNC: 5.1 G/DL (ref 6.4–8.2)
RBC # BLD AUTO: 3.09 M/UL (ref 4.1–5.7)
SODIUM SERPL-SCNC: 146 MMOL/L (ref 136–145)
TIBC SERPL-MCNC: 122 UG/DL (ref 250–450)
TSH SERPL DL<=0.05 MIU/L-ACNC: 1.97 UIU/ML (ref 0.36–3.74)
VIT B12 SERPL-MCNC: >2000 PG/ML (ref 193–986)
WBC # BLD AUTO: 3 K/UL (ref 4.1–11.1)

## 2023-08-12 PROCEDURE — 82746 ASSAY OF FOLIC ACID SERUM: CPT

## 2023-08-12 PROCEDURE — 2500000003 HC RX 250 WO HCPCS: Performed by: INTERNAL MEDICINE

## 2023-08-12 PROCEDURE — 2060000000 HC ICU INTERMEDIATE R&B

## 2023-08-12 PROCEDURE — 82728 ASSAY OF FERRITIN: CPT

## 2023-08-12 PROCEDURE — 2580000003 HC RX 258: Performed by: FAMILY MEDICINE

## 2023-08-12 PROCEDURE — 83036 HEMOGLOBIN GLYCOSYLATED A1C: CPT

## 2023-08-12 PROCEDURE — 83550 IRON BINDING TEST: CPT

## 2023-08-12 PROCEDURE — 99232 SBSQ HOSP IP/OBS MODERATE 35: CPT | Performed by: SURGERY

## 2023-08-12 PROCEDURE — 84100 ASSAY OF PHOSPHORUS: CPT

## 2023-08-12 PROCEDURE — 83540 ASSAY OF IRON: CPT

## 2023-08-12 PROCEDURE — 36415 COLL VENOUS BLD VENIPUNCTURE: CPT

## 2023-08-12 PROCEDURE — 83735 ASSAY OF MAGNESIUM: CPT

## 2023-08-12 PROCEDURE — 2580000003 HC RX 258: Performed by: PHYSICIAN ASSISTANT

## 2023-08-12 PROCEDURE — C9113 INJ PANTOPRAZOLE SODIUM, VIA: HCPCS | Performed by: PHYSICIAN ASSISTANT

## 2023-08-12 PROCEDURE — 6360000002 HC RX W HCPCS: Performed by: PHYSICIAN ASSISTANT

## 2023-08-12 PROCEDURE — A4216 STERILE WATER/SALINE, 10 ML: HCPCS | Performed by: PHYSICIAN ASSISTANT

## 2023-08-12 PROCEDURE — 82607 VITAMIN B-12: CPT

## 2023-08-12 PROCEDURE — 80053 COMPREHEN METABOLIC PANEL: CPT

## 2023-08-12 PROCEDURE — 2580000003 HC RX 258: Performed by: INTERNAL MEDICINE

## 2023-08-12 PROCEDURE — 84443 ASSAY THYROID STIM HORMONE: CPT

## 2023-08-12 PROCEDURE — 6360000002 HC RX W HCPCS: Performed by: INTERNAL MEDICINE

## 2023-08-12 PROCEDURE — 85025 COMPLETE CBC W/AUTO DIFF WBC: CPT

## 2023-08-12 RX ADMIN — POTASSIUM CHLORIDE AND SODIUM CHLORIDE: 900; 300 INJECTION, SOLUTION INTRAVENOUS at 20:00

## 2023-08-12 RX ADMIN — PIPERACILLIN AND TAZOBACTAM 3375 MG: 3; .375 INJECTION, POWDER, LYOPHILIZED, FOR SOLUTION INTRAVENOUS at 08:55

## 2023-08-12 RX ADMIN — SODIUM CHLORIDE, PRESERVATIVE FREE 10 ML: 5 INJECTION INTRAVENOUS at 08:42

## 2023-08-12 RX ADMIN — PIPERACILLIN AND TAZOBACTAM 3375 MG: 3; .375 INJECTION, POWDER, LYOPHILIZED, FOR SOLUTION INTRAVENOUS at 15:34

## 2023-08-12 RX ADMIN — POTASSIUM PHOSPHATE, MONOBASIC POTASSIUM PHOSPHATE, DIBASIC 30 MMOL: 224; 236 INJECTION, SOLUTION, CONCENTRATE INTRAVENOUS at 15:36

## 2023-08-12 RX ADMIN — PANTOPRAZOLE SODIUM 40 MG: 40 INJECTION, POWDER, LYOPHILIZED, FOR SOLUTION INTRAVENOUS at 12:24

## 2023-08-12 RX ADMIN — POTASSIUM CHLORIDE AND SODIUM CHLORIDE: 900; 300 INJECTION, SOLUTION INTRAVENOUS at 07:02

## 2023-08-12 NOTE — PROGRESS NOTES
General surgery at bedside to assess patient. NG tube clamped, no prior orders for NG tube to be hooked to suction. MD placed orders for low intermittent suction. Patient placed on regular low suction per MD request. 500 ML came out, per MD request patient placed on low intermittent suction.

## 2023-08-12 NOTE — PLAN OF CARE
Problem: Discharge Planning  Goal: Discharge to home or other facility with appropriate resources  8/12/2023 0018 by Lorelei Muñiz RN  Outcome: Progressing  Discharge to home or other facility with appropriate resources:   Identify barriers to discharge with patient and caregiver   Arrange for needed discharge resources and transportation as appropriate   Identify discharge learning needs (meds, wound care, etc)     Problem: Skin/Tissue Integrity  Goal: Absence of new skin breakdown  Description: 1. Monitor for areas of redness and/or skin breakdown  2. Assess vascular access sites hourly  3. Every 4-6 hours minimum:  Change oxygen saturation probe site  4. Every 4-6 hours:  If on nasal continuous positive airway pressure, respiratory therapy assess nares and determine need for appliance change or resting period.   8/12/2023 0018 by Lorelei Muñiz RN  Outcome: Progressing    Problem: Safety - Adult  Goal: Free from fall injury  8/12/2023 0018 by Lorelei Muñiz RN  Outcome: Progressing

## 2023-08-12 NOTE — PROGRESS NOTES
scleroicterus; Neck: supple, no JVD, no meningeal signs  Chest: Clear to auscultation bilaterally, RRR, no rales, rhonchi or wheezing;    CVS: S1 S2 heard, RRR;   Abd: soft/ non tender, non distended, BS physiological   Ext: no clubbing, no cyanosis, no edema; Neuro/Psych: pleasant mood and affect, no focal neuro deficits;   Skin: warm, no rash;     Data Review:    Review and/or order of clinical lab test  Review and/or order of tests in the radiology section of CPT  Review and/or order of tests in the medicine section of CPT      I have personally and independently reviewed all pertinent labs, diagnostic studies, imaging, and have provided independent interpretation of the same. Labs:     Recent Labs     08/11/23  0738 08/12/23  0256   WBC 3.4* 3.0*   HGB 11.5* 10.9*   HCT 33.7* 33.3*    143*       Recent Labs     08/10/23  1223 08/11/23  0738 08/12/23  0256 08/12/23  0847    142 146*  --    K 2.2* 2.9* 3.2*  --    CL 96* 102 109*  --    CO2 40* 35* 30  --    BUN 56* 42* 39*  --    MG 2.3 2.1 2.0  --    PHOS 2.4*  --  2.7 2.5*       Recent Labs     08/10/23  1223 08/12/23  0256   ALT 43 46   GLOB 3.1 2.5       No results for input(s): INR, APTT in the last 72 hours. Invalid input(s): PTP   Recent Labs     08/12/23  0256   TIBC 122*      No results found for: FOL, RBCF   No results for input(s): PH, PCO2, PO2 in the last 72 hours. No results for input(s): CPK in the last 72 hours. Invalid input(s): CPKMB, CKNDX, TROIQ  No results found for: CHOL, CHOLX, CHLST, CHOLV, HDL, HDLC, LDL, LDLC, TGLX, TRIGL  No results found for: GLUCPOC  [unfilled]    Notes reviewed from all clinical/nonclinical/nursing services involved in patient's clinical care. Care coordination discussions were held with appropriate clinical/nonclinical/ nursing providers based on care coordination needs.          Patients current active Medications were reviewed, considered, added and adjusted based on the clinical condition today.      Home Medications were reconciled to the best of my ability given all available resources at the time of admission. Route is PO if not otherwise noted.       Admission Status:[id number]:::1}      Medications Reviewed:     Current Facility-Administered Medications   Medication Dose Route Frequency    sodium chloride flush 0.9 % injection 5-40 mL  5-40 mL IntraVENous 2 times per day    sodium chloride flush 0.9 % injection 5-40 mL  5-40 mL IntraVENous PRN    0.9 % sodium chloride infusion   IntraVENous PRN    ondansetron (ZOFRAN-ODT) disintegrating tablet 4 mg  4 mg Oral Q8H PRN    Or    ondansetron (ZOFRAN) injection 4 mg  4 mg IntraVENous Q6H PRN    polyethylene glycol (GLYCOLAX) packet 17 g  17 g Oral Daily PRN    acetaminophen (TYLENOL) tablet 650 mg  650 mg Oral Q6H PRN    Or    acetaminophen (TYLENOL) suppository 650 mg  650 mg Rectal Q6H PRN    naloxone (NARCAN) injection 0.4 mg  0.4 mg IntraVENous PRN    piperacillin-tazobactam (ZOSYN) 3,375 mg in sodium chloride 0.9 % 50 mL IVPB (mini-bag)  3,375 mg IntraVENous q8h    pantoprazole (PROTONIX) 40 mg in sodium chloride (PF) 0.9 % 10 mL injection  40 mg IntraVENous Q12H    0.9% NaCl with KCl 40 mEq infusion   IntraVENous Continuous    HYDROmorphone HCl PF (DILAUDID) injection 1 mg  1 mg IntraVENous Q3H PRN     ______________________________________________________________________  EXPECTED LENGTH OF STAY: Unable to retrieve estimated LOS  ACTUAL LENGTH OF STAY:          2                 Tushar Mesa MD

## 2023-08-12 NOTE — PLAN OF CARE
Problem: Discharge Planning  Goal: Discharge to home or other facility with appropriate resources  8/12/2023 0931 by Bety Barney RN  Outcome: Progressing  8/12/2023 0018 by Carlos Hernandez RN  Outcome: Progressing     Problem: Skin/Tissue Integrity  Goal: Absence of new skin breakdown  Description: 1. Monitor for areas of redness and/or skin breakdown  2. Assess vascular access sites hourly  3. Every 4-6 hours minimum:  Change oxygen saturation probe site  4. Every 4-6 hours:  If on nasal continuous positive airway pressure, respiratory therapy assess nares and determine need for appliance change or resting period.   8/12/2023 0931 by Bety Barney RN  Outcome: Progressing  8/12/2023 0018 by Carlos Hernandez RN  Outcome: Progressing     Problem: Safety - Adult  Goal: Free from fall injury  8/12/2023 0931 by Bety Barney RN  Outcome: Progressing  8/12/2023 0018 by Carlos Hernandez RN  Outcome: Progressing     Problem: Cardiovascular - Adult  Goal: Maintains optimal cardiac output and hemodynamic stability  Outcome: Progressing  Goal: Absence of cardiac dysrhythmias or at baseline  Outcome: Progressing     Problem: Gastrointestinal - Adult  Goal: Minimal or absence of nausea and vomiting  Outcome: Progressing  Goal: Maintains or returns to baseline bowel function  Outcome: Progressing  Goal: Maintains adequate nutritional intake  Outcome: Progressing  Goal: Establish and maintain optimal ostomy function  Outcome: Progressing     Problem: Metabolic/Fluid and Electrolytes - Adult  Goal: Electrolytes maintained within normal limits  Outcome: Progressing  Goal: Hemodynamic stability and optimal renal function maintained  Outcome: Progressing  Goal: Glucose maintained within prescribed range  Outcome: Progressing     Problem: Pain  Goal: Verbalizes/displays adequate comfort level or baseline comfort level  Outcome: Progressing  Flowsheets (Taken 8/11/2023 2000 by

## 2023-08-12 NOTE — PROGRESS NOTES
Bedside and Verbal shift change report given to [facility]  (oncoming nurse) by Elysia Demarco (offgoing nurse). Report included the following information Nurse Handoff Report, ED SBAR, Intake/Output, MAR, Recent Results, and Cardiac Rhythm Sinus Matias Miller .

## 2023-08-12 NOTE — PROGRESS NOTES
Bedside shift change report given to [facility] (oncoming nurse) by Ivan Collins RN (offgoing nurse). Report included the following information  Index, Intake/Output, MAR, and Cardiac Rhythm sinus braulio . Shift worked:  0730-2000     Shift summary and any significant changes:     NG tube was hooked to low intermittent suction today per general surgery.            Neuro:  Oriented X- 4      Cardiac:  HR- 44  BP- 122/61  Rhythm-sinus braulio      Repiratory:  02 Sat-98%   02 device- RA          GI:  Bowel sounds- Active        :  Voiding?- yes  Urine characteristics- jaden         Skin:  Intact?- yes          Access:  R AC & L Forearm

## 2023-08-13 LAB
ALBUMIN SERPL-MCNC: 2.6 G/DL (ref 3.5–5)
ALBUMIN/GLOB SERPL: 0.9 (ref 1.1–2.2)
ALP SERPL-CCNC: 93 U/L (ref 45–117)
ALT SERPL-CCNC: 68 U/L (ref 12–78)
ANION GAP SERPL CALC-SCNC: 8 MMOL/L (ref 5–15)
AST SERPL-CCNC: 106 U/L (ref 15–37)
BASOPHILS # BLD: 0 K/UL (ref 0–0.1)
BASOPHILS NFR BLD: 0 % (ref 0–1)
BILIRUB SERPL-MCNC: 1 MG/DL (ref 0.2–1)
BUN SERPL-MCNC: 36 MG/DL (ref 6–20)
BUN/CREAT SERPL: 49 (ref 12–20)
CALCIUM SERPL-MCNC: 8.4 MG/DL (ref 8.5–10.1)
CHLORIDE SERPL-SCNC: 113 MMOL/L (ref 97–108)
CO2 SERPL-SCNC: 27 MMOL/L (ref 21–32)
CREAT SERPL-MCNC: 0.73 MG/DL (ref 0.7–1.3)
DIFFERENTIAL METHOD BLD: ABNORMAL
EOSINOPHIL # BLD: 0 K/UL (ref 0–0.4)
EOSINOPHIL NFR BLD: 0 % (ref 0–7)
ERYTHROCYTE [DISTWIDTH] IN BLOOD BY AUTOMATED COUNT: 13.9 % (ref 11.5–14.5)
GLOBULIN SER CALC-MCNC: 2.9 G/DL (ref 2–4)
GLUCOSE BLD STRIP.AUTO-MCNC: 109 MG/DL (ref 65–117)
GLUCOSE BLD STRIP.AUTO-MCNC: 38 MG/DL (ref 65–117)
GLUCOSE BLD STRIP.AUTO-MCNC: 46 MG/DL (ref 65–117)
GLUCOSE BLD STRIP.AUTO-MCNC: 55 MG/DL (ref 65–117)
GLUCOSE BLD STRIP.AUTO-MCNC: 75 MG/DL (ref 65–117)
GLUCOSE BLD STRIP.AUTO-MCNC: 77 MG/DL (ref 65–117)
GLUCOSE BLD STRIP.AUTO-MCNC: 86 MG/DL (ref 65–117)
GLUCOSE BLD STRIP.AUTO-MCNC: 87 MG/DL (ref 65–117)
GLUCOSE BLD STRIP.AUTO-MCNC: 87 MG/DL (ref 65–117)
GLUCOSE BLD STRIP.AUTO-MCNC: 88 MG/DL (ref 65–117)
GLUCOSE SERPL-MCNC: 40 MG/DL (ref 65–100)
HCT VFR BLD AUTO: 34.1 % (ref 36.6–50.3)
HGB BLD-MCNC: 11.7 G/DL (ref 12.1–17)
IMM GRANULOCYTES # BLD AUTO: 0 K/UL (ref 0–0.04)
IMM GRANULOCYTES NFR BLD AUTO: 1 % (ref 0–0.5)
LIPASE SERPL-CCNC: 201 U/L (ref 73–393)
LYMPHOCYTES # BLD: 1 K/UL (ref 0.8–3.5)
LYMPHOCYTES NFR BLD: 29 % (ref 12–49)
MAGNESIUM SERPL-MCNC: 2 MG/DL (ref 1.6–2.4)
MCH RBC QN AUTO: 36.3 PG (ref 26–34)
MCHC RBC AUTO-ENTMCNC: 34.3 G/DL (ref 30–36.5)
MCV RBC AUTO: 105.9 FL (ref 80–99)
MONOCYTES # BLD: 0.3 K/UL (ref 0–1)
MONOCYTES NFR BLD: 8 % (ref 5–13)
NEUTS SEG # BLD: 2.3 K/UL (ref 1.8–8)
NEUTS SEG NFR BLD: 62 % (ref 32–75)
NRBC # BLD: 0 K/UL (ref 0–0.01)
NRBC BLD-RTO: 0 PER 100 WBC
PHOSPHATE SERPL-MCNC: 3.6 MG/DL (ref 2.6–4.7)
PLATELET # BLD AUTO: 152 K/UL (ref 150–400)
PMV BLD AUTO: 10.1 FL (ref 8.9–12.9)
POTASSIUM SERPL-SCNC: 3.6 MMOL/L (ref 3.5–5.1)
PROT SERPL-MCNC: 5.5 G/DL (ref 6.4–8.2)
RBC # BLD AUTO: 3.22 M/UL (ref 4.1–5.7)
SERVICE CMNT-IMP: ABNORMAL
SERVICE CMNT-IMP: NORMAL
SODIUM SERPL-SCNC: 148 MMOL/L (ref 136–145)
WBC # BLD AUTO: 3.6 K/UL (ref 4.1–11.1)

## 2023-08-13 PROCEDURE — 2580000003 HC RX 258: Performed by: FAMILY MEDICINE

## 2023-08-13 PROCEDURE — C9113 INJ PANTOPRAZOLE SODIUM, VIA: HCPCS | Performed by: PHYSICIAN ASSISTANT

## 2023-08-13 PROCEDURE — 83690 ASSAY OF LIPASE: CPT

## 2023-08-13 PROCEDURE — 80053 COMPREHEN METABOLIC PANEL: CPT

## 2023-08-13 PROCEDURE — 2580000003 HC RX 258: Performed by: PHYSICIAN ASSISTANT

## 2023-08-13 PROCEDURE — 6360000002 HC RX W HCPCS: Performed by: PHYSICIAN ASSISTANT

## 2023-08-13 PROCEDURE — 76937 US GUIDE VASCULAR ACCESS: CPT

## 2023-08-13 PROCEDURE — 84100 ASSAY OF PHOSPHORUS: CPT

## 2023-08-13 PROCEDURE — 85025 COMPLETE CBC W/AUTO DIFF WBC: CPT

## 2023-08-13 PROCEDURE — 2500000003 HC RX 250 WO HCPCS: Performed by: NURSE PRACTITIONER

## 2023-08-13 PROCEDURE — 6360000002 HC RX W HCPCS

## 2023-08-13 PROCEDURE — 02HV33Z INSERTION OF INFUSION DEVICE INTO SUPERIOR VENA CAVA, PERCUTANEOUS APPROACH: ICD-10-PCS | Performed by: NURSE PRACTITIONER

## 2023-08-13 PROCEDURE — 6360000002 HC RX W HCPCS: Performed by: NURSE PRACTITIONER

## 2023-08-13 PROCEDURE — 99231 SBSQ HOSP IP/OBS SF/LOW 25: CPT | Performed by: SURGERY

## 2023-08-13 PROCEDURE — C1769 GUIDE WIRE: HCPCS

## 2023-08-13 PROCEDURE — 2500000003 HC RX 250 WO HCPCS: Performed by: SURGERY

## 2023-08-13 PROCEDURE — 2580000003 HC RX 258: Performed by: NURSE PRACTITIONER

## 2023-08-13 PROCEDURE — A4216 STERILE WATER/SALINE, 10 ML: HCPCS | Performed by: PHYSICIAN ASSISTANT

## 2023-08-13 PROCEDURE — 82962 GLUCOSE BLOOD TEST: CPT

## 2023-08-13 PROCEDURE — 2709999900 HC NON-CHARGEABLE SUPPLY

## 2023-08-13 PROCEDURE — C1751 CATH, INF, PER/CENT/MIDLINE: HCPCS

## 2023-08-13 PROCEDURE — 83735 ASSAY OF MAGNESIUM: CPT

## 2023-08-13 PROCEDURE — 6360000002 HC RX W HCPCS: Performed by: SURGERY

## 2023-08-13 PROCEDURE — 2580000003 HC RX 258: Performed by: INTERNAL MEDICINE

## 2023-08-13 PROCEDURE — 36415 COLL VENOUS BLD VENIPUNCTURE: CPT

## 2023-08-13 PROCEDURE — 2060000000 HC ICU INTERMEDIATE R&B

## 2023-08-13 PROCEDURE — 6360000002 HC RX W HCPCS: Performed by: INTERNAL MEDICINE

## 2023-08-13 RX ORDER — ONDANSETRON 4 MG/1
4 TABLET, ORALLY DISINTEGRATING ORAL EVERY 6 HOURS PRN
Status: DISCONTINUED | OUTPATIENT
Start: 2023-08-13 | End: 2023-08-28 | Stop reason: HOSPADM

## 2023-08-13 RX ORDER — SODIUM CHLORIDE 0.9 % (FLUSH) 0.9 %
5-40 SYRINGE (ML) INJECTION EVERY 12 HOURS SCHEDULED
Status: DISCONTINUED | OUTPATIENT
Start: 2023-08-13 | End: 2023-08-28 | Stop reason: HOSPADM

## 2023-08-13 RX ORDER — ONDANSETRON 2 MG/ML
4 INJECTION INTRAMUSCULAR; INTRAVENOUS EVERY 6 HOURS PRN
Status: DISCONTINUED | OUTPATIENT
Start: 2023-08-13 | End: 2023-08-15

## 2023-08-13 RX ORDER — SODIUM CHLORIDE 9 MG/ML
INJECTION, SOLUTION INTRAVENOUS PRN
Status: DISCONTINUED | OUTPATIENT
Start: 2023-08-13 | End: 2023-08-28 | Stop reason: HOSPADM

## 2023-08-13 RX ORDER — DEXTROSE MONOHYDRATE 100 MG/ML
INJECTION, SOLUTION INTRAVENOUS CONTINUOUS PRN
Status: DISCONTINUED | OUTPATIENT
Start: 2023-08-13 | End: 2023-08-28 | Stop reason: HOSPADM

## 2023-08-13 RX ORDER — ACETAMINOPHEN 500 MG
1000 TABLET ORAL EVERY 6 HOURS PRN
Status: DISCONTINUED | OUTPATIENT
Start: 2023-08-13 | End: 2023-08-23

## 2023-08-13 RX ORDER — DEXTROSE MONOHYDRATE, SODIUM CHLORIDE, AND POTASSIUM CHLORIDE 50; 1.49; 9 G/1000ML; G/1000ML; G/1000ML
INJECTION, SOLUTION INTRAVENOUS CONTINUOUS
Status: DISCONTINUED | OUTPATIENT
Start: 2023-08-13 | End: 2023-08-16

## 2023-08-13 RX ORDER — ONDANSETRON 2 MG/ML
INJECTION INTRAMUSCULAR; INTRAVENOUS
Status: COMPLETED
Start: 2023-08-13 | End: 2023-08-13

## 2023-08-13 RX ORDER — SODIUM CHLORIDE 0.9 % (FLUSH) 0.9 %
5-40 SYRINGE (ML) INJECTION PRN
Status: DISCONTINUED | OUTPATIENT
Start: 2023-08-13 | End: 2023-08-23

## 2023-08-13 RX ADMIN — PIPERACILLIN AND TAZOBACTAM 3375 MG: 3; .375 INJECTION, POWDER, LYOPHILIZED, FOR SOLUTION INTRAVENOUS at 23:18

## 2023-08-13 RX ADMIN — PIPERACILLIN AND TAZOBACTAM 3375 MG: 3; .375 INJECTION, POWDER, LYOPHILIZED, FOR SOLUTION INTRAVENOUS at 15:32

## 2023-08-13 RX ADMIN — POTASSIUM CHLORIDE, DEXTROSE MONOHYDRATE AND SODIUM CHLORIDE: 150; 5; 900 INJECTION, SOLUTION INTRAVENOUS at 04:43

## 2023-08-13 RX ADMIN — PIPERACILLIN AND TAZOBACTAM 3375 MG: 3; .375 INJECTION, POWDER, LYOPHILIZED, FOR SOLUTION INTRAVENOUS at 08:09

## 2023-08-13 RX ADMIN — SODIUM CHLORIDE, PRESERVATIVE FREE 10 ML: 5 INJECTION INTRAVENOUS at 08:29

## 2023-08-13 RX ADMIN — HYDROMORPHONE HYDROCHLORIDE 1 MG: 1 INJECTION, SOLUTION INTRAMUSCULAR; INTRAVENOUS; SUBCUTANEOUS at 00:20

## 2023-08-13 RX ADMIN — HYDROMORPHONE HYDROCHLORIDE 1 MG: 1 INJECTION, SOLUTION INTRAMUSCULAR; INTRAVENOUS; SUBCUTANEOUS at 20:31

## 2023-08-13 RX ADMIN — POTASSIUM CHLORIDE, DEXTROSE MONOHYDRATE AND SODIUM CHLORIDE: 150; 5; 900 INJECTION, SOLUTION INTRAVENOUS at 15:31

## 2023-08-13 RX ADMIN — DEXTROSE MONOHYDRATE 125 ML: 100 INJECTION, SOLUTION INTRAVENOUS at 02:04

## 2023-08-13 RX ADMIN — PANTOPRAZOLE SODIUM 40 MG: 40 INJECTION, POWDER, LYOPHILIZED, FOR SOLUTION INTRAVENOUS at 11:36

## 2023-08-13 RX ADMIN — PIPERACILLIN AND TAZOBACTAM 3375 MG: 3; .375 INJECTION, POWDER, LYOPHILIZED, FOR SOLUTION INTRAVENOUS at 00:20

## 2023-08-13 RX ADMIN — PANTOPRAZOLE SODIUM 40 MG: 40 INJECTION, POWDER, LYOPHILIZED, FOR SOLUTION INTRAVENOUS at 00:20

## 2023-08-13 RX ADMIN — ONDANSETRON HYDROCHLORIDE 4 MG: 2 INJECTION, SOLUTION INTRAMUSCULAR; INTRAVENOUS at 20:40

## 2023-08-13 RX ADMIN — PANTOPRAZOLE SODIUM 40 MG: 40 INJECTION, POWDER, LYOPHILIZED, FOR SOLUTION INTRAVENOUS at 23:18

## 2023-08-13 RX ADMIN — SODIUM CHLORIDE, PRESERVATIVE FREE 10 ML: 5 INJECTION INTRAVENOUS at 11:36

## 2023-08-13 RX ADMIN — SODIUM CHLORIDE, PRESERVATIVE FREE 10 ML: 5 INJECTION INTRAVENOUS at 20:32

## 2023-08-13 RX ADMIN — HYDROMORPHONE HYDROCHLORIDE 1 MG: 1 INJECTION, SOLUTION INTRAMUSCULAR; INTRAVENOUS; SUBCUTANEOUS at 23:17

## 2023-08-13 RX ADMIN — ONDANSETRON 4 MG: 2 INJECTION INTRAMUSCULAR; INTRAVENOUS at 20:40

## 2023-08-13 ASSESSMENT — PAIN SCALES - GENERAL
PAINLEVEL_OUTOF10: 7
PAINLEVEL_OUTOF10: 8
PAINLEVEL_OUTOF10: 0
PAINLEVEL_OUTOF10: 6
PAINLEVEL_OUTOF10: 0

## 2023-08-13 ASSESSMENT — PAIN DESCRIPTION - DESCRIPTORS: DESCRIPTORS: ACHING;DULL;SORE;TENDER

## 2023-08-13 ASSESSMENT — PAIN DESCRIPTION - LOCATION
LOCATION: ABDOMEN;RIB CAGE
LOCATION: ABDOMEN
LOCATION: ABDOMEN;RIB CAGE

## 2023-08-13 ASSESSMENT — PAIN DESCRIPTION - ORIENTATION
ORIENTATION: RIGHT;LEFT;MID
ORIENTATION: RIGHT;LEFT

## 2023-08-13 NOTE — PROGRESS NOTES
Mr. Hui Garcia has no c/o today. No abdominal pain. NG tube is not working. Tm 98.5 Tc 97.3 HR: 36 BP: 114/78 Resp Rate: 15 96% sat on room air. Intake/Output Summary (Last 24 hours) at 8/13/2023 1044  Last data filed at 8/12/2023 2000  Gross per 24 hour   Intake --   Output 1100 ml   Net -1100 ml   Exam: Cor: RRR. Lungs: Bilateral breath sounds. Abd: Soft. Non distended. Non tender. No guarding or rebound.    Labs:   Recent Results (from the past 12 hour(s))   CBC with Auto Differential    Collection Time: 08/13/23 12:32 AM   Result Value Ref Range    WBC 3.6 (L) 4.1 - 11.1 K/uL    RBC 3.22 (L) 4.10 - 5.70 M/uL    Hemoglobin 11.7 (L) 12.1 - 17.0 g/dL    Hematocrit 34.1 (L) 36.6 - 50.3 %    .9 (H) 80.0 - 99.0 FL    MCH 36.3 (H) 26.0 - 34.0 PG    MCHC 34.3 30.0 - 36.5 g/dL    RDW 13.9 11.5 - 14.5 %    Platelets 685 312 - 626 K/uL    MPV 10.1 8.9 - 12.9 FL    Nucleated RBCs 0.0 0  WBC    nRBC 0.00 0.00 - 0.01 K/uL    Neutrophils % 62 32 - 75 %    Lymphocytes % 29 12 - 49 %    Monocytes % 8 5 - 13 %    Eosinophils % 0 0 - 7 %    Basophils % 0 0 - 1 %    Immature Granulocytes 1 (H) 0.0 - 0.5 %    Neutrophils Absolute 2.3 1.8 - 8.0 K/UL    Lymphocytes Absolute 1.0 0.8 - 3.5 K/UL    Monocytes Absolute 0.3 0.0 - 1.0 K/UL    Eosinophils Absolute 0.0 0.0 - 0.4 K/UL    Basophils Absolute 0.0 0.0 - 0.1 K/UL    Absolute Immature Granulocyte 0.0 0.00 - 0.04 K/UL    Differential Type AUTOMATED     Comprehensive Metabolic Panel    Collection Time: 08/13/23 12:32 AM   Result Value Ref Range    Sodium 148 (H) 136 - 145 mmol/L    Potassium 3.6 3.5 - 5.1 mmol/L    Chloride 113 (H) 97 - 108 mmol/L    CO2 27 21 - 32 mmol/L    Anion Gap 8 5 - 15 mmol/L    Glucose 40 (LL) 65 - 100 mg/dL    BUN 36 (H) 6 - 20 MG/DL    Creatinine 0.73 0.70 - 1.30 MG/DL    Bun/Cre Ratio 49 (H) 12 - 20      Est, Glom Filt Rate >60 >60 ml/min/1.73m2    Calcium 8.4 (L) 8.5 - 10.1 MG/DL    Total chips for now. Agree with need for parenteral nutrition. Will check UGI via NG in AM on August 14, 2023. Needs to be OOB. Plans per Dr. Natali Caban.

## 2023-08-13 NOTE — PROGRESS NOTES
Bedside shift change report given to April  (oncoming nurse) by Alana Sicard, RN (offgoing nurse). Report included the following information  Index, Intake/Output, MAR, and Cardiac Rhythm Sinus braulio. Shift worked:  0730-2000     Shift summary and any significant changes:     Patients NG tube was clogged, RN flushed NG tube and per MD orders patient was placed on low regular suction.            Neuro:  Oriented X- 4      Cardia 52  BP- 113/63  Rhythm- Sinus braulio      Repiratory:  02 Sat- 98%  02 device- RA          GI:  Bowel sounds- active        :  Voiding?- yes   Urine characteristics- jaden         Skin:  Intact?- yes         Access:  R AC & L Forearm

## 2023-08-13 NOTE — PLAN OF CARE
Problem: Discharge Planning  Goal: Discharge to home or other facility with appropriate resources  8/13/2023 1027 by Ishan Lora RN  Outcome: Progressing  8/12/2023 2247 by Abbi Ellison RN  Outcome: Progressing     Problem: Skin/Tissue Integrity  Goal: Absence of new skin breakdown  Description: 1. Monitor for areas of redness and/or skin breakdown  2. Assess vascular access sites hourly  3. Every 4-6 hours minimum:  Change oxygen saturation probe site  4. Every 4-6 hours:  If on nasal continuous positive airway pressure, respiratory therapy assess nares and determine need for appliance change or resting period.   8/13/2023 1027 by Ishan Lora RN  Outcome: Progressing  8/12/2023 2247 by Abbi Ellison RN  Outcome: Progressing     Problem: Safety - Adult  Goal: Free from fall injury  8/13/2023 1027 by Ishan Lora RN  Outcome: Progressing  8/12/2023 2247 by Abbi Ellison RN  Outcome: Progressing     Problem: Cardiovascular - Adult  Goal: Maintains optimal cardiac output and hemodynamic stability  8/13/2023 1027 by Ishan Lora RN  Outcome: Progressing  8/12/2023 2247 by Abbi Ellison RN  Outcome: Progressing  Goal: Absence of cardiac dysrhythmias or at baseline  8/13/2023 1027 by Ishan Lora RN  Outcome: Progressing  8/12/2023 2247 by Abbi Ellison RN  Outcome: Progressing     Problem: Gastrointestinal - Adult  Goal: Minimal or absence of nausea and vomiting  8/13/2023 1027 by Ishan Lora RN  Outcome: Progressing  8/12/2023 2247 by Abbi Ellison RN  Outcome: Progressing  Goal: Maintains or returns to baseline bowel function  8/13/2023 1027 by Ishan Lora RN  Outcome: Progressing  8/12/2023 2247 by Abbi Ellison RN  Outcome: Progressing  Goal: Maintains adequate nutritional intake  8/13/2023 1027 by Ishan Lora RN  Outcome: Progressing  8/12/2023 2247 by Abbi Ellison RN  Outcome: Progressing  Goal: Establish and maintain optimal ostomy function  8/13/2023 1027 by Rock Patricia RN  Outcome: Progressing  8/12/2023 2247 by Naresh Roy RN  Outcome: Progressing     Problem: Metabolic/Fluid and Electrolytes - Adult  Goal: Electrolytes maintained within normal limits  8/13/2023 1027 by Rock Patricia RN  Outcome: Progressing  8/12/2023 2247 by Naresh Roy RN  Outcome: Progressing  Goal: Hemodynamic stability and optimal renal function maintained  8/13/2023 1027 by Rock Patricia RN  Outcome: Progressing  8/12/2023 2247 by Naresh Roy RN  Outcome: Progressing  Goal: Glucose maintained within prescribed range  8/13/2023 1027 by Rock Patricia RN  Outcome: Progressing  8/12/2023 2247 by Naresh Roy RN  Outcome: Progressing     Problem: Pain  Goal: Verbalizes/displays adequate comfort level or baseline comfort level  8/13/2023 1027 by Rock Patricia RN  Outcome: Progressing  8/12/2023 2247 by Naresh Roy RN  Outcome: Progressing     Problem: Nutrition Deficit:  Goal: Optimize nutritional status  8/13/2023 1027 by Rock Patricia RN  Outcome: Progressing  8/12/2023 2247 by Naresh Roy RN  Outcome: Progressing

## 2023-08-13 NOTE — PLAN OF CARE
2000: Bedside shift change report given to [facility] (oncoming nurse) by Be Olivas (offgoing nurse). Report included the following information SBAR, Kardex, Intake/Output, MAR, and Cardiac Rhythm SB . Shift Summary:    HYPOGLYCEMIC EPISODE DOCUMENTATION    Patient with hypoglycemic episode(s) at 0133(time) on 8/13(date). BG value(s) pre-treatment 45    Was patient symptomatic? [] yes, [x] no  Patient was treated with the following rescue medications/treatments: [] D50                [] Glucose tablets                [] Glucagon                [x] 4oz juice                [] 6oz reg soda                [x] 8oz low fat milk  BG value post-treatment: 17 & 46 on two separate hands  Once BG treated and value greater than 80mg/dl, pt was provided with the following:  [] snack  [] meal  Name of MD notified: Tegan Butterfield NP  The following orders were received: D10 125ml bolus with repeat for BG under 70, Recheck was 55, ran another D10 125ml bolus, Recheck was 109, Fluids adjusted to include d5 in continuous fluids. Pt resting comfortably in bed    0730: Bedside shift change report given to Be Olivas (oncoming nurse) by [facility] (offgoing nurse). Report included the following information SBAR, Kardex, Intake/Output, MAR, and Cardiac Rhythm SB . Problem: Discharge Planning  Goal: Discharge to home or other facility with appropriate resources  8/12/2023 2247 by Evelia Mooney RN  Outcome: Progressing  8/12/2023 0931 by Sandy Machuca RN  Outcome: Progressing     Problem: Skin/Tissue Integrity  Goal: Absence of new skin breakdown  Description: 1. Monitor for areas of redness and/or skin breakdown  2. Assess vascular access sites hourly  3. Every 4-6 hours minimum:  Change oxygen saturation probe site  4. Every 4-6 hours:  If on nasal continuous positive airway pressure, respiratory therapy assess nares and determine need for appliance change or resting period.   8/12/2023 2247 by Evelia Mooney RN  Outcome:

## 2023-08-13 NOTE — PROGRESS NOTES
Patient NG has not had output since 0800. Per Dr. Amos Doan if NG tube cannot be flushed replace NG tube and place patient on regular low suction. RN flushed patient NG tube. Patient placed on low regular suction. 800ML came out once patient was hooked back up.

## 2023-08-14 ENCOUNTER — APPOINTMENT (OUTPATIENT)
Facility: HOSPITAL | Age: 68
DRG: 326 | End: 2023-08-14
Attending: STUDENT IN AN ORGANIZED HEALTH CARE EDUCATION/TRAINING PROGRAM
Payer: MEDICARE

## 2023-08-14 LAB
ALBUMIN SERPL-MCNC: 2.2 G/DL (ref 3.5–5)
ALBUMIN/GLOB SERPL: 0.7 (ref 1.1–2.2)
ALP SERPL-CCNC: 105 U/L (ref 45–117)
ALT SERPL-CCNC: 70 U/L (ref 12–78)
ANION GAP SERPL CALC-SCNC: 3 MMOL/L (ref 5–15)
AST SERPL-CCNC: 101 U/L (ref 15–37)
BILIRUB SERPL-MCNC: 0.9 MG/DL (ref 0.2–1)
BUN SERPL-MCNC: 23 MG/DL (ref 6–20)
BUN/CREAT SERPL: 34 (ref 12–20)
CALCIUM SERPL-MCNC: 8.3 MG/DL (ref 8.5–10.1)
CHLORIDE SERPL-SCNC: 115 MMOL/L (ref 97–108)
CO2 SERPL-SCNC: 27 MMOL/L (ref 21–32)
CREAT SERPL-MCNC: 0.68 MG/DL (ref 0.7–1.3)
EKG ATRIAL RATE: 38 BPM
EKG DIAGNOSIS: NORMAL
EKG P AXIS: 96 DEGREES
EKG P-R INTERVAL: 216 MS
EKG Q-T INTERVAL: 568 MS
EKG QRS DURATION: 134 MS
EKG QTC CALCULATION (BAZETT): 451 MS
EKG R AXIS: -75 DEGREES
EKG T AXIS: -8 DEGREES
EKG VENTRICULAR RATE: 38 BPM
ERYTHROCYTE [DISTWIDTH] IN BLOOD BY AUTOMATED COUNT: 13.8 % (ref 11.5–14.5)
GLOBULIN SER CALC-MCNC: 3.1 G/DL (ref 2–4)
GLUCOSE BLD STRIP.AUTO-MCNC: NORMAL MG/DL (ref 65–117)
GLUCOSE SERPL-MCNC: 91 MG/DL (ref 65–100)
HCT VFR BLD AUTO: 33.9 % (ref 36.6–50.3)
HGB BLD-MCNC: 11.3 G/DL (ref 12.1–17)
MAGNESIUM SERPL-MCNC: 1.9 MG/DL (ref 1.6–2.4)
MCH RBC QN AUTO: 35.6 PG (ref 26–34)
MCHC RBC AUTO-ENTMCNC: 33.3 G/DL (ref 30–36.5)
MCV RBC AUTO: 106.9 FL (ref 80–99)
NRBC # BLD: 0 K/UL (ref 0–0.01)
NRBC BLD-RTO: 0 PER 100 WBC
PHOSPHATE SERPL-MCNC: 1.9 MG/DL (ref 2.6–4.7)
PLATELET # BLD AUTO: 145 K/UL (ref 150–400)
PMV BLD AUTO: 10.5 FL (ref 8.9–12.9)
POTASSIUM SERPL-SCNC: 3.5 MMOL/L (ref 3.5–5.1)
PROT SERPL-MCNC: 5.3 G/DL (ref 6.4–8.2)
RBC # BLD AUTO: 3.17 M/UL (ref 4.1–5.7)
SERVICE CMNT-IMP: NORMAL
SODIUM SERPL-SCNC: 145 MMOL/L (ref 136–145)
WBC # BLD AUTO: 2.8 K/UL (ref 4.1–11.1)

## 2023-08-14 PROCEDURE — 2500000003 HC RX 250 WO HCPCS: Performed by: SURGERY

## 2023-08-14 PROCEDURE — 74240 X-RAY XM UPR GI TRC 1CNTRST: CPT

## 2023-08-14 PROCEDURE — 2060000000 HC ICU INTERMEDIATE R&B

## 2023-08-14 PROCEDURE — 85027 COMPLETE CBC AUTOMATED: CPT

## 2023-08-14 PROCEDURE — 80053 COMPREHEN METABOLIC PANEL: CPT

## 2023-08-14 PROCEDURE — 6360000002 HC RX W HCPCS: Performed by: SURGERY

## 2023-08-14 PROCEDURE — 99231 SBSQ HOSP IP/OBS SF/LOW 25: CPT | Performed by: SURGERY

## 2023-08-14 PROCEDURE — 84100 ASSAY OF PHOSPHORUS: CPT

## 2023-08-14 PROCEDURE — 6360000002 HC RX W HCPCS: Performed by: INTERNAL MEDICINE

## 2023-08-14 PROCEDURE — 83735 ASSAY OF MAGNESIUM: CPT

## 2023-08-14 PROCEDURE — 2580000003 HC RX 258: Performed by: INTERNAL MEDICINE

## 2023-08-14 PROCEDURE — 2500000003 HC RX 250 WO HCPCS: Performed by: INTERNAL MEDICINE

## 2023-08-14 PROCEDURE — 36415 COLL VENOUS BLD VENIPUNCTURE: CPT

## 2023-08-14 PROCEDURE — 2580000003 HC RX 258: Performed by: FAMILY MEDICINE

## 2023-08-14 RX ADMIN — PIPERACILLIN AND TAZOBACTAM 3375 MG: 3; .375 INJECTION, POWDER, LYOPHILIZED, FOR SOLUTION INTRAVENOUS at 09:29

## 2023-08-14 RX ADMIN — SODIUM CHLORIDE, PRESERVATIVE FREE 10 ML: 5 INJECTION INTRAVENOUS at 09:30

## 2023-08-14 RX ADMIN — POTASSIUM PHOSPHATE, MONOBASIC AND POTASSIUM PHOSPHATE, DIBASIC 30 MMOL: 224; 236 INJECTION, SOLUTION, CONCENTRATE INTRAVENOUS at 17:34

## 2023-08-14 RX ADMIN — HYDROMORPHONE HYDROCHLORIDE 1 MG: 1 INJECTION, SOLUTION INTRAMUSCULAR; INTRAVENOUS; SUBCUTANEOUS at 02:53

## 2023-08-14 RX ADMIN — SODIUM CHLORIDE, PRESERVATIVE FREE 10 ML: 5 INJECTION INTRAVENOUS at 21:20

## 2023-08-14 RX ADMIN — POTASSIUM CHLORIDE, DEXTROSE MONOHYDRATE AND SODIUM CHLORIDE: 150; 5; 900 INJECTION, SOLUTION INTRAVENOUS at 17:34

## 2023-08-14 RX ADMIN — SODIUM CHLORIDE, PRESERVATIVE FREE 10 ML: 5 INJECTION INTRAVENOUS at 21:19

## 2023-08-14 ASSESSMENT — PAIN DESCRIPTION - ORIENTATION: ORIENTATION: LEFT;RIGHT

## 2023-08-14 ASSESSMENT — PAIN SCALES - GENERAL
PAINLEVEL_OUTOF10: 7
PAINLEVEL_OUTOF10: 0

## 2023-08-14 ASSESSMENT — PAIN DESCRIPTION - LOCATION: LOCATION: RIB CAGE

## 2023-08-14 NOTE — PLAN OF CARE
Problem: Discharge Planning  Goal: Discharge to home or other facility with appropriate resources  Outcome: Progressing  Flowsheets (Taken 8/14/2023 0800)  Discharge to home or other facility with appropriate resources: Identify barriers to discharge with patient and caregiver     Problem: Skin/Tissue Integrity  Goal: Absence of new skin breakdown  Description: 1. Monitor for areas of redness and/or skin breakdown  2. Assess vascular access sites hourly  3. Every 4-6 hours minimum:  Change oxygen saturation probe site  4. Every 4-6 hours:  If on nasal continuous positive airway pressure, respiratory therapy assess nares and determine need for appliance change or resting period.   Outcome: Progressing     Problem: Safety - Adult  Goal: Free from fall injury  Outcome: Progressing     Problem: Cardiovascular - Adult  Goal: Maintains optimal cardiac output and hemodynamic stability  Outcome: Progressing  Flowsheets (Taken 8/14/2023 0800)  Maintains optimal cardiac output and hemodynamic stability: Monitor blood pressure and heart rate  Goal: Absence of cardiac dysrhythmias or at baseline  Outcome: Progressing  Flowsheets (Taken 8/14/2023 0800)  Absence of cardiac dysrhythmias or at baseline: Monitor cardiac rate and rhythm     Problem: Gastrointestinal - Adult  Goal: Minimal or absence of nausea and vomiting  Outcome: Progressing  Flowsheets (Taken 8/14/2023 0800)  Minimal or absence of nausea and vomiting:   Administer IV fluids as ordered to ensure adequate hydration   Maintain NPO status until nausea and vomiting are resolved  Goal: Maintains or returns to baseline bowel function  Outcome: Progressing  Flowsheets (Taken 8/14/2023 0800)  Maintains or returns to baseline bowel function:   Encourage oral fluids to ensure adequate hydration   Assess bowel function   Administer IV fluids as ordered to ensure adequate hydration   Administer ordered medications as needed  Goal: Maintains adequate nutritional intake  Outcome: Progressing  Flowsheets (Taken 8/14/2023 0800)  Maintains adequate nutritional intake:   Monitor intake and output, weight and lab values   Identify factors contributing to decreased intake, treat as appropriate  Goal: Establish and maintain optimal ostomy function  Outcome: Progressing     Problem: Metabolic/Fluid and Electrolytes - Adult  Goal: Electrolytes maintained within normal limits  Outcome: Progressing  Flowsheets (Taken 8/14/2023 0800)  Electrolytes maintained within normal limits: Monitor labs and assess patient for signs and symptoms of electrolyte imbalances  Goal: Hemodynamic stability and optimal renal function maintained  Outcome: Progressing  Flowsheets (Taken 8/14/2023 0800)  Hemodynamic stability and optimal renal function maintained: Monitor labs and assess for signs and symptoms of volume excess or deficit  Goal: Glucose maintained within prescribed range  Outcome: Progressing  Flowsheets (Taken 8/14/2023 0800)  Glucose maintained within prescribed range:   Monitor blood glucose as ordered   Assess for signs and symptoms of hyperglycemia and hypoglycemia     Problem: Pain  Goal: Verbalizes/displays adequate comfort level or baseline comfort level  Outcome: Progressing     Problem: Nutrition Deficit:  Goal: Optimize nutritional status  Outcome: Progressing

## 2023-08-14 NOTE — PROGRESS NOTES
Bedside and Verbal shift change report given to [address] [place] [address] (oncoming nurse) by Fabi Cunningham  (offgoing nurse). Report included the following information Nurse Handoff Report, Index, Intake/Output, MAR, Recent Results, and Cardiac Rhythm Sinus Rekha Johnson .

## 2023-08-14 NOTE — PROGRESS NOTES
2031-Patient actively vomiting. NG tube in place and hooked up to continuous suction, cannister empty. Upon further inspection the cannister was not closed completely on the top, I closed it and switched the suction to low-intermittent suction, per order. NG immediately put out 100mls, clear. Patient asked for more ice chips and I explained that he can only have a few every so often because that is contributing to his vomiting. Pt only had PO Zofran on his MAR, I reached out to the NP for IV Zofran. Patient complaining of abdominal pain and rib pain from vomiting. IV Zofran and PRN Dilaudid administered. 1110-Patient called out stating his NG tube was out. The same 100ml is in the cannister, he stated it just fell out. He said they were going to take it out today and he wants to leave it out until he see's the doctor tomorrow because \"ain't nothing coming out\". I informed the patient of the importance of replacing the NG Tube and he politely declined.

## 2023-08-14 NOTE — PLAN OF CARE
Problem: Discharge Planning  Goal: Discharge to home or other facility with appropriate resources  Outcome: Progressing  Flowsheets (Taken 8/13/2023 2040)  Discharge to home or other facility with appropriate resources:   Identify barriers to discharge with patient and caregiver   Arrange for needed discharge resources and transportation as appropriate   Identify discharge learning needs (meds, wound care, etc)   Arrange for interpreters to assist at discharge as needed     Problem: Skin/Tissue Integrity  Goal: Absence of new skin breakdown  Description: 1. Monitor for areas of redness and/or skin breakdown  2. Assess vascular access sites hourly  3. Every 4-6 hours minimum:  Change oxygen saturation probe site  4. Every 4-6 hours:  If on nasal continuous positive airway pressure, respiratory therapy assess nares and determine need for appliance change or resting period.   Outcome: Progressing     Problem: Safety - Adult  Goal: Free from fall injury  Outcome: Progressing     Problem: Cardiovascular - Adult  Goal: Maintains optimal cardiac output and hemodynamic stability  Outcome: Progressing  Flowsheets (Taken 8/13/2023 2040)  Maintains optimal cardiac output and hemodynamic stability:   Monitor blood pressure and heart rate   Monitor urine output and notify Licensed Independent Practitioner for values outside of normal range   Assess for signs of decreased cardiac output  Goal: Absence of cardiac dysrhythmias or at baseline  Outcome: Progressing  Flowsheets (Taken 8/13/2023 2040)  Absence of cardiac dysrhythmias or at baseline:   Monitor cardiac rate and rhythm   Assess for signs of decreased cardiac output     Problem: Gastrointestinal - Adult  Goal: Minimal or absence of nausea and vomiting  Outcome: Progressing  Flowsheets (Taken 8/13/2023 2040)  Minimal or absence of nausea and vomiting:   Administer IV fluids as ordered to ensure adequate hydration   Maintain NPO status until nausea and vomiting are

## 2023-08-14 NOTE — PROGRESS NOTES
Physician Progress Note      PATIENT:               Devonte Gomez  CSN #:                  [id number]  :                       1955  ADMIT DATE:       8/10/2023 11:13 PM  [facility] DATE:  RESPONDING  PROVIDER #:        Dale Akins MD          QUERY TEXT:    Dear attending,    Pt noted to have BPH and was treated with Flomax. ?If possible, please document in progress notes and discharge summary if you   are evaluating and/or treating any of the following: The medical record reflects the following:  Risk Factors: hx. bph    Clinical Indicators: - Per PN-BPH:  continue Flomax  Treatment: Flomax 0.4 mg po bid, monitor I&O    Thank you,    Tammi Donaldson RN, BSN, CRCR, CCDS  Clinical Documentation Improvement  [phone] or via [employer]  Options provided:  -- BPH with partial/complete urinary obstruction  -- BPH with urinary retention without obstruction  -- Other - I will add my own diagnosis  -- Disagree - Not applicable / Not valid  -- Disagree - Clinically unable to determine / Unknown  -- Refer to Clinical Documentation Reviewer    PROVIDER RESPONSE TEXT:    This patient has BPH with urinary retention without obstruction.     Query created by: Aramis Smith on 2023 12:47 PM      Electronically signed by:  Dale Akins MD 2023 1:03 PM

## 2023-08-14 NOTE — PROGRESS NOTES
Mr. Campos Friday has no complaints today. Doing well with NG out. Tm 98.5 Tc 97.4 HR: 55 BP: 99/69 Resp Rate:  18 100% sat on room air. Intake/Output Summary (Last 24 hours) at 8/14/2023 1437  Last data filed at 8/14/2023 1221  Gross per 24 hour   Intake 3293.33 ml   Output 1200 ml   Net 2093.33 ml   Exam: Cor: RRR. Lungs: Bilateral breath sounds. Abd: Soft. Non distended. Non tender. No associated guarding or rebound.   Labs:   Recent Results (from the past 12 hour(s))   CBC    Collection Time: 08/14/23  3:02 AM   Result Value Ref Range    WBC 2.8 (L) 4.1 - 11.1 K/uL    RBC 3.17 (L) 4.10 - 5.70 M/uL    Hemoglobin 11.3 (L) 12.1 - 17.0 g/dL    Hematocrit 33.9 (L) 36.6 - 50.3 %    .9 (H) 80.0 - 99.0 FL    MCH 35.6 (H) 26.0 - 34.0 PG    MCHC 33.3 30.0 - 36.5 g/dL    RDW 13.8 11.5 - 14.5 %    Platelets 203 (L) 310 - 400 K/uL    MPV 10.5 8.9 - 12.9 FL    Nucleated RBCs 0.0 0  WBC    nRBC 0.00 0.00 - 0.01 K/uL   Comprehensive Metabolic Panel    Collection Time: 08/14/23  3:02 AM   Result Value Ref Range    Sodium 145 136 - 145 mmol/L    Potassium 3.5 3.5 - 5.1 mmol/L    Chloride 115 (H) 97 - 108 mmol/L    CO2 27 21 - 32 mmol/L    Anion Gap 3 (L) 5 - 15 mmol/L    Glucose 91 65 - 100 mg/dL    BUN 23 (H) 6 - 20 MG/DL    Creatinine 0.68 (L) 0.70 - 1.30 MG/DL    Bun/Cre Ratio 34 (H) 12 - 20      Est, Glom Filt Rate >60 >60 ml/min/1.73m2    Calcium 8.3 (L) 8.5 - 10.1 MG/DL    Total Bilirubin 0.9 0.2 - 1.0 MG/DL    ALT 70 12 - 78 U/L     (H) 15 - 37 U/L    Alk Phosphatase 105 45 - 117 U/L    Total Protein 5.3 (L) 6.4 - 8.2 g/dL    Albumin 2.2 (L) 3.5 - 5.0 g/dL    Globulin 3.1 2.0 - 4.0 g/dL    Albumin/Globulin Ratio 0.7 (L) 1.1 - 2.2     Phosphorus    Collection Time: 08/14/23  3:02 AM   Result Value Ref Range    Phosphorus 1.9 (L) 2.6 - 4.7 MG/DL   Magnesium    Collection Time: 08/14/23  3:02 AM   Result Value Ref Range    Magnesium 1.9 1.6 - 2.4 mg/dL Reviewed barium swallow. Mr. Claudia Toure is a 76 yo man with a gastric outlet obstruction. Will leave NG out for now but will replace if nausea and vomiting recur. Needs nutritional support via central TPN. GI to see again for consideration of EGD. Continue Protonix 40 mg q 12 hours for now. Will stop IV abx. OOB, ambulate. Anti-emetics and pain medication as needed. Plans per Dr. Davila Score.

## 2023-08-14 NOTE — PROGRESS NOTES
Comprehensive Nutrition Assessment    Type and Reason for Visit: Reassess    Nutrition Recommendations/Plan:     Recommend replete Phos  Recommend giving IV thiamine now  Given severity of malnutrition and refeeding risk, recommend cautious initiation and advancement of PN support daily pending labs  Day 1 - Clinimix 4.25%AA, 10% dex @ 42 mL/hr, include thiamine  Day 2 - Clinimix 4.25%AA, 10% dex @ 63 mL/hr, include thiamine  Day 3 - customize TPN: 75 gm AA, 200 gm dex @ 75 mL/hr (or higher rate if needed) include thiamine    Please D/C additional D5 with start of PN support  Please do not order lipids yet    RD to re-evaluate in 2-3 days and provide additional recs for advancing TPN further to goal (100 gm AA, 300 gm dex @ 75 mL/hr + 250 mL 20% lipids daily)         Malnutrition Assessment:  Malnutrition Status:  Severe malnutrition (08/11/23 1522)    Context:  Chronic Illness     Findings of the 6 clinical characteristics of malnutrition:  Energy Intake:  75% or less estimated energy requirements for 1 month or longer  Weight Loss:  Greater than 10% over 6 months     Body Fat Loss:  Severe body fat loss Triceps, Fat Overlying Ribs   Muscle Mass Loss:  Severe muscle mass loss Temples (temporalis), Clavicles (pectoralis & deltoids), Calf (gastrocnemius), Hand (interosseous)  Fluid Accumulation:  No significant fluid accumulation     Strength:  Not Performed       Nutrition Assessment:    Past Medical History:   Diagnosis Date    BPH (benign prostatic hyperplasia)     Cannabis dependence in remission (HCC)     Cocaine dependence in remission ([address])     Depression     Erectile dysfunction     GERD (gastroesophageal reflux disease)     Hepatitis C virus     HTN (hypertension)     Lung cancer (HCC)     Polysubstance abuse ([address])     Primary open angle glaucoma     Prostate cancer (HCC)     Unilateral primary osteoarthritis, right knee      Pt admitted with Gastric emphysema. GI and surgery consulted.   GI work-up Intake Outcomes: Parenteral Nutrition Intake/Tolerance, Vitamin/Mineral Intake  Physical Signs/Symptoms Outcomes: Biochemical Data, GI Status, Nutrition Focused Physical Findings, Weight    Discharge Planning:     Too soon to determine     Recent Labs     08/12/23  0256 08/12/23  0847 08/13/23  0032 08/14/23  0302   GLUCOSE 57*  --  40* 91   BUN 39*  --  36* 23*   CREATININE 0.71  --  0.73 0.68*   *  --  148* 145   K 3.2*  --  3.6 3.5   *  --  113* 115*   CO2 30  --  27 27   CALCIUM 8.2*  --  8.4* 8.3*   PHOS 2.7 2.5* 3.6 1.9*   MG 2.0  --  2.0 1.9           [employer]  Available via [employer]

## 2023-08-14 NOTE — PROGRESS NOTES
Physician Progress Note      PATIENT:               Lisa Patel  CSN #:                  [id number]  :                       1955  ADMIT DATE:       8/10/2023 11:13 PM  [facility] DATE:  RESPONDING  PROVIDER #:        Deion Jacobs MD          QUERY TEXT:    Dear attending,    Patient admitted with Gastric emphysema. Pt noted to have UA with 4+ bacteria. The patient has an elevated lactic acid level and WBC less than 4.1 K/ul. If possible, please document in the progress notes and discharge summary if   you are evaluating and /or treating any of the following: The medical record reflects the following:  Risk Factors:  has Severe protein calorie malnutrition    Clinical Indicators: - lactic acid level 2.4, WBC 3.4, UA +4 bacteria  - Per PN-Gastric emphysema:  patient presents with abdominal pain, distension, inability to tolerate PO,   significant weight loss;  possible etiology is ischemia, infection less likely;  empiric IV Abx: Zosyn;    Treatment:  IV zosyn 3.375 g q8 hours, Monitor vital signs, labwork, imaging    Thank you,    Tammi Donaldson RN, BSN, CRCR, CCDS  Clinical Documentation Improvement  [phone] or via [employer]  Options provided:  -- SIRS of non-infectious origin due to gastric emphysema without acute organ   dysfunction  -- SIRS of infectious origin due to UTI  -- Sepsis, POA due to UTI  -- Other - I will add my own diagnosis  -- Disagree - Not applicable / Not valid  -- Disagree - Clinically unable to determine / Unknown  -- Refer to Clinical Documentation Reviewer    PROVIDER RESPONSE TEXT:    This patient has SIRS of non-infectious origin without acute organ   dysfunction.     Query created by: Mariluz Hays on 2023 12:41 PM      Electronically signed by:  Deion Jacobs MD 2023 12:44 PM

## 2023-08-14 NOTE — PROGRESS NOTES
Bedside and Verbal shift change report given to Adama Chandler (oncoming nurse) by Amanda HODGE (offgoing nurse). Report included the following information Nurse Handoff Report, Index, Intake/Output, MAR, Recent Results, and Cardiac Rhythm Sbrady/NSR .

## 2023-08-14 NOTE — PROGRESS NOTES
[facility]                                                                                          Hospitalist Progress Note  Sharad Magallon MD  Answering service: [phone] or [phone] from in house phone        Date of Service:  2023  NAME:  Marlee Rubi  :  1955  MRN:  [id number]       Admission Summary:   Patient is a 76 yo M who was transferred to [facility] from [facility] for management of gastric emphysema. Interval history / Subjective:   NG tube came out this morning. Results of the upper GI series were reviewed and discussed with surgery and GI. Plan is to continue n.p.o., and proceed with EGD. Awaiting central line for TPN. Assessment & Plan:        Gastric emphysema:  - patient presents with abdominal pain, distension, inability to tolerate PO, significant weight loss;   - possible etiology is ischemia, infection less likely;   - keep patient NPO;  -NGT;  - IVFs;   - IV PPI;  - Surgery consult appreciated;   - GI consult;   - empiric IV Abx: Zosyn;   - monitor closely, as patient is at high risk of further decline; he was instructed to alert the nursing staff if his pain worsens;   - high surgical risk;  - :  Upper GI series with SB follow-through:    Markedly dilated stomach. No barium was seen to pass through the pylorus, even  on 20 minute delayed imaging, in keeping with outlet obstruction. Severe protein calorie malnutrition:  - progressive weight loss over the past few months - cancer and inability to tolerate PO;   - at risk of further weight loss if unable to tolerate PO;   - discussed with Dietician: patient at high surgical risk, keep NPO for now, if needs to remain NPO for longer than 2-3 days, will initiate IV nutrition (TPN vs PPN);  - at high risk of refeeding syndrome;   - awaiting central line to start TPN;      History of lung cancer:   - patient states he was diagnosed with  lung

## 2023-08-14 NOTE — PROGRESS NOTES
[address] [facility], [address]       GI PROGRESS NOTE  Will Rozayonathan Meals  [phone] office  [phone] NP/PA in-hospital cell phone M-F until 4:30PM  After 5PM or on weekends, please call  for physician on call      NAME: Iona Adame   :  1955   MRN:  [id number]       Subjective:   Patient is spitting in an emesis bag. Denies nausea or abdominal pain. NG tube is out. Objective:     VITALS:   Last 24hrs VS reviewed since prior progress note. Most recent are:  Vitals:    23 1400   BP:    Pulse: 55   Resp:    Temp:    SpO2:      PHYSICAL EXAM:  General: Cooperative, no acute distress    Neurologic:  Alert and oriented X 3  HEENT: EOMI, no scleral icterus  Lungs:  No acute respiratory distress  Heart:  S1 S2  Abdomen: Soft, non-distended, no tenderness, no guarding, no rebound. +Bowel sounds. Extremities: Warm  Psych:   Good insight. Not anxious or agitated.     Lab Data Reviewed:     Recent Results (from the past 24 hour(s))   POCT Glucose    Collection Time: 23  5:16 PM   Result Value Ref Range    POC Glucose 87 65 - 117 mg/dL    Performed by: Anthony RUIZ    CBC    Collection Time: 23  3:02 AM   Result Value Ref Range    WBC 2.8 (L) 4.1 - 11.1 K/uL    RBC 3.17 (L) 4.10 - 5.70 M/uL    Hemoglobin 11.3 (L) 12.1 - 17.0 g/dL    Hematocrit 33.9 (L) 36.6 - 50.3 %    .9 (H) 80.0 - 99.0 FL    MCH 35.6 (H) 26.0 - 34.0 PG    MCHC 33.3 30.0 - 36.5 g/dL    RDW 13.8 11.5 - 14.5 %    Platelets 139 (L) 048 - 400 K/uL    MPV 10.5 8.9 - 12.9 FL    Nucleated RBCs 0.0 0  WBC    nRBC 0.00 0.00 - 0.01 K/uL   Comprehensive Metabolic Panel    Collection Time: 23  3:02 AM   Result Value Ref Range    Sodium 145 136 - 145 mmol/L    Potassium 3.5 3.5 - 5.1 mmol/L    Chloride 115 (H) 97 - 108 mmol/L    CO2 27 21 - 32 mmol/L    Anion Gap 3 (L) 5 - 15 mmol/L    Glucose 91 65 - 100 mg/dL    BUN 23 (H) 6 - 20 MG/DL    Creatinine 0.68

## 2023-08-14 NOTE — PROGRESS NOTES
Bedside shift change report given to Chasidy Pederson RN (oncoming nurse) by AYESHA Dale (offgoing nurse). Report included the following information Nurse Handoff Report, Index, Intake/Output, MAR, Recent Results, and Cardiac Rhythm Sinus braulio-NSR .

## 2023-08-15 ENCOUNTER — ANESTHESIA (OUTPATIENT)
Facility: HOSPITAL | Age: 68
End: 2023-08-15
Payer: MEDICARE

## 2023-08-15 ENCOUNTER — ANESTHESIA EVENT (OUTPATIENT)
Facility: HOSPITAL | Age: 68
End: 2023-08-15
Payer: MEDICARE

## 2023-08-15 ENCOUNTER — APPOINTMENT (OUTPATIENT)
Facility: HOSPITAL | Age: 68
DRG: 326 | End: 2023-08-15
Attending: STUDENT IN AN ORGANIZED HEALTH CARE EDUCATION/TRAINING PROGRAM
Payer: MEDICARE

## 2023-08-15 LAB
COMMENT:: NORMAL
SPECIMEN HOLD: NORMAL

## 2023-08-15 PROCEDURE — 2580000003 HC RX 258: Performed by: NURSE ANESTHETIST, CERTIFIED REGISTERED

## 2023-08-15 PROCEDURE — 2580000003 HC RX 258: Performed by: PHYSICIAN ASSISTANT

## 2023-08-15 PROCEDURE — 6360000002 HC RX W HCPCS: Performed by: NURSE ANESTHETIST, CERTIFIED REGISTERED

## 2023-08-15 PROCEDURE — 3700000001 HC ADD 15 MINUTES (ANESTHESIA): Performed by: INTERNAL MEDICINE

## 2023-08-15 PROCEDURE — 2709999900 HC NON-CHARGEABLE SUPPLY: Performed by: INTERNAL MEDICINE

## 2023-08-15 PROCEDURE — 6360000002 HC RX W HCPCS: Performed by: INTERNAL MEDICINE

## 2023-08-15 PROCEDURE — 2060000000 HC ICU INTERMEDIATE R&B

## 2023-08-15 PROCEDURE — 2500000003 HC RX 250 WO HCPCS: Performed by: INTERNAL MEDICINE

## 2023-08-15 PROCEDURE — 3600007502: Performed by: INTERNAL MEDICINE

## 2023-08-15 PROCEDURE — A4216 STERILE WATER/SALINE, 10 ML: HCPCS | Performed by: INTERNAL MEDICINE

## 2023-08-15 PROCEDURE — 88341 IMHCHEM/IMCYTCHM EA ADD ANTB: CPT

## 2023-08-15 PROCEDURE — 6360000002 HC RX W HCPCS: Performed by: SURGERY

## 2023-08-15 PROCEDURE — C9113 INJ PANTOPRAZOLE SODIUM, VIA: HCPCS | Performed by: PHYSICIAN ASSISTANT

## 2023-08-15 PROCEDURE — 0DB98ZX EXCISION OF DUODENUM, VIA NATURAL OR ARTIFICIAL OPENING ENDOSCOPIC, DIAGNOSTIC: ICD-10-PCS | Performed by: INTERNAL MEDICINE

## 2023-08-15 PROCEDURE — 88305 TISSUE EXAM BY PATHOLOGIST: CPT

## 2023-08-15 PROCEDURE — 2580000003 HC RX 258: Performed by: INTERNAL MEDICINE

## 2023-08-15 PROCEDURE — 3600007512: Performed by: INTERNAL MEDICINE

## 2023-08-15 PROCEDURE — 6370000000 HC RX 637 (ALT 250 FOR IP): Performed by: SURGERY

## 2023-08-15 PROCEDURE — 3700000000 HC ANESTHESIA ATTENDED CARE: Performed by: INTERNAL MEDICINE

## 2023-08-15 PROCEDURE — A4216 STERILE WATER/SALINE, 10 ML: HCPCS | Performed by: PHYSICIAN ASSISTANT

## 2023-08-15 PROCEDURE — 88342 IMHCHEM/IMCYTCHM 1ST ANTB: CPT

## 2023-08-15 PROCEDURE — 2500000003 HC RX 250 WO HCPCS: Performed by: NURSE ANESTHETIST, CERTIFIED REGISTERED

## 2023-08-15 PROCEDURE — 7100000010 HC PHASE II RECOVERY - FIRST 15 MIN: Performed by: INTERNAL MEDICINE

## 2023-08-15 PROCEDURE — 99231 SBSQ HOSP IP/OBS SF/LOW 25: CPT | Performed by: SURGERY

## 2023-08-15 PROCEDURE — C9113 INJ PANTOPRAZOLE SODIUM, VIA: HCPCS | Performed by: INTERNAL MEDICINE

## 2023-08-15 PROCEDURE — 71045 X-RAY EXAM CHEST 1 VIEW: CPT

## 2023-08-15 PROCEDURE — 6360000002 HC RX W HCPCS: Performed by: PHYSICIAN ASSISTANT

## 2023-08-15 PROCEDURE — 7100000011 HC PHASE II RECOVERY - ADDTL 15 MIN: Performed by: INTERNAL MEDICINE

## 2023-08-15 RX ORDER — PHENYLEPHRINE HCL IN 0.9% NACL 0.4MG/10ML
SYRINGE (ML) INTRAVENOUS PRN
Status: DISCONTINUED | OUTPATIENT
Start: 2023-08-15 | End: 2023-08-15 | Stop reason: SDUPTHER

## 2023-08-15 RX ORDER — SODIUM CHLORIDE 0.9 % (FLUSH) 0.9 %
5-40 SYRINGE (ML) INJECTION EVERY 12 HOURS SCHEDULED
Status: DISCONTINUED | OUTPATIENT
Start: 2023-08-15 | End: 2023-08-15 | Stop reason: HOSPADM

## 2023-08-15 RX ORDER — SODIUM CHLORIDE 9 MG/ML
INJECTION, SOLUTION INTRAVENOUS CONTINUOUS
Status: DISCONTINUED | OUTPATIENT
Start: 2023-08-15 | End: 2023-08-15

## 2023-08-15 RX ORDER — SODIUM CHLORIDE 0.9 % (FLUSH) 0.9 %
5-40 SYRINGE (ML) INJECTION PRN
Status: DISCONTINUED | OUTPATIENT
Start: 2023-08-15 | End: 2023-08-15 | Stop reason: HOSPADM

## 2023-08-15 RX ORDER — SODIUM CHLORIDE 9 MG/ML
INJECTION, SOLUTION INTRAVENOUS CONTINUOUS PRN
Status: DISCONTINUED | OUTPATIENT
Start: 2023-08-15 | End: 2023-08-15 | Stop reason: SDUPTHER

## 2023-08-15 RX ORDER — SODIUM CHLORIDE 9 MG/ML
25 INJECTION, SOLUTION INTRAVENOUS PRN
Status: DISCONTINUED | OUTPATIENT
Start: 2023-08-15 | End: 2023-08-15 | Stop reason: HOSPADM

## 2023-08-15 RX ORDER — LIDOCAINE HYDROCHLORIDE 20 MG/ML
INJECTION, SOLUTION EPIDURAL; INFILTRATION; INTRACAUDAL; PERINEURAL PRN
Status: DISCONTINUED | OUTPATIENT
Start: 2023-08-15 | End: 2023-08-15 | Stop reason: SDUPTHER

## 2023-08-15 RX ORDER — EPHEDRINE SULFATE 50 MG/ML
INJECTION INTRAVENOUS PRN
Status: DISCONTINUED | OUTPATIENT
Start: 2023-08-15 | End: 2023-08-15 | Stop reason: SDUPTHER

## 2023-08-15 RX ADMIN — Medication 80 MCG: at 13:22

## 2023-08-15 RX ADMIN — EPHEDRINE SULFATE 10 MG: 50 INJECTION INTRAVENOUS at 13:20

## 2023-08-15 RX ADMIN — Medication 120 MCG: at 13:23

## 2023-08-15 RX ADMIN — LIDOCAINE HYDROCHLORIDE 50 MG: 20 INJECTION, SOLUTION EPIDURAL; INFILTRATION; INTRACAUDAL; PERINEURAL at 13:13

## 2023-08-15 RX ADMIN — PROPOFOL 20 MG: 10 INJECTION, EMULSION INTRAVENOUS at 13:20

## 2023-08-15 RX ADMIN — HYDROMORPHONE HYDROCHLORIDE 1 MG: 1 INJECTION, SOLUTION INTRAMUSCULAR; INTRAVENOUS; SUBCUTANEOUS at 16:30

## 2023-08-15 RX ADMIN — PROPOFOL 10 MG: 10 INJECTION, EMULSION INTRAVENOUS at 13:39

## 2023-08-15 RX ADMIN — ONDANSETRON 4 MG: 4 TABLET, ORALLY DISINTEGRATING ORAL at 16:30

## 2023-08-15 RX ADMIN — PANTOPRAZOLE SODIUM 40 MG: 40 INJECTION, POWDER, LYOPHILIZED, FOR SOLUTION INTRAVENOUS at 22:57

## 2023-08-15 RX ADMIN — PROPOFOL 10 MG: 10 INJECTION, EMULSION INTRAVENOUS at 13:34

## 2023-08-15 RX ADMIN — SODIUM CHLORIDE: 9 INJECTION, SOLUTION INTRAVENOUS at 13:03

## 2023-08-15 RX ADMIN — EPHEDRINE SULFATE 10 MG: 50 INJECTION INTRAVENOUS at 13:16

## 2023-08-15 RX ADMIN — PANTOPRAZOLE SODIUM 40 MG: 40 INJECTION, POWDER, LYOPHILIZED, FOR SOLUTION INTRAVENOUS at 00:23

## 2023-08-15 RX ADMIN — POTASSIUM CHLORIDE: 2 INJECTION, SOLUTION, CONCENTRATE INTRAVENOUS at 19:26

## 2023-08-15 RX ADMIN — PROPOFOL 25 MG: 10 INJECTION, EMULSION INTRAVENOUS at 13:16

## 2023-08-15 RX ADMIN — Medication 120 MCG: at 13:29

## 2023-08-15 RX ADMIN — POTASSIUM CHLORIDE, DEXTROSE MONOHYDRATE AND SODIUM CHLORIDE: 150; 5; 900 INJECTION, SOLUTION INTRAVENOUS at 16:30

## 2023-08-15 RX ADMIN — THIAMINE HYDROCHLORIDE 100 MG: 100 INJECTION, SOLUTION INTRAMUSCULAR; INTRAVENOUS at 17:59

## 2023-08-15 RX ADMIN — PROPOFOL 10 MG: 10 INJECTION, EMULSION INTRAVENOUS at 13:29

## 2023-08-15 RX ADMIN — PROPOFOL 25 MG: 10 INJECTION, EMULSION INTRAVENOUS at 13:14

## 2023-08-15 ASSESSMENT — PAIN SCALES - GENERAL
PAINLEVEL_OUTOF10: 0
PAINLEVEL_OUTOF10: 0

## 2023-08-15 NOTE — PLAN OF CARE
Problem: Discharge Planning  Goal: Discharge to home or other facility with appropriate resources  8/15/2023 0127 by Víctor Nicole RN  Outcome: Progressing     Problem: Skin/Tissue Integrity  Goal: Absence of new skin breakdown  Description: 1. Monitor for areas of redness and/or skin breakdown  2. Assess vascular access sites hourly  3. Every 4-6 hours minimum:  Change oxygen saturation probe site  4. Every 4-6 hours:  If on nasal continuous positive airway pressure, respiratory therapy assess nares and determine need for appliance change or resting period.   8/15/2023 0127 by Víctor Nicole RN  Outcome: Progressing    Problem: Safety - Adult  Goal: Free from fall injury  8/15/2023 0127 by Víctor Nicole RN  Outcome: Progressing  8/14/2023 1627 by Yaakov Sotelo RN    Problem: Cardiovascular - Adult  Goal: Maintains optimal cardiac output and hemodynamic stability  8/14/2023 1627 by Yaakov Sotelo RN  Outcome: Progressing

## 2023-08-15 NOTE — PROGRESS NOTES
1800: Noticed pt's arm was swollen where IV was and promptly stopped fluids. IV removed and ice pack placed to help swelling go down. Pt said he'd noticed his arm was getting bigger but didn't call out about it. CCU RN placed new IV.    1830: Spoke w/ anesthesia & PACU about order for central line for pt. PACU RN said that [facility] would [facility] after 1900 [facility]

## 2023-08-15 NOTE — PROGRESS NOTES
Bedside and Verbal shift change report given to [address] [place] [address] (oncoming nurse) by Martin Hankins RN (offgoing nurse). Report included the following information Nurse Handoff Report, Index, ED SBAR, Intake/Output, MAR, Recent Results, and Cardiac Rhythm Sinus Shaquille Mirza . <--- Click to Launch ICDx for PreOp, PostOp and Procedure

## 2023-08-15 NOTE — PROGRESS NOTES
[address] [facility]  Hospitalist Group                                                                                          Hospitalist Progress Note  June Morales MD  Answering service: [phone] or [phone] from in house phone        Date of Service:  8/15/2023  NAME:  Arvind Dallas  :  1955  MRN:  [id number]       Admission Summary:   Patient is a 78 yo M who was transferred to [facility] from [facility] for management of gastric emphysema. Interval history / Subjective:   Results of EGD reviewed and discussed with patient, RN, RD. Awaiting central line for TPN. EGD this morning. Assessment & Plan:        Gastric emphysema:  - patient presents with abdominal pain, distension, inability to tolerate PO, significant weight loss;   - possible etiology is ischemia, infection less likely;   - keep patient NPO;  -NGT;  - IVFs;   - IV PPI;  - Surgery consult appreciated;   - GI consult;   - empiric IV Abx: Zosyn;   - monitor closely, as patient is at high risk of further decline; he was instructed to alert the nursing staff if his pain worsens;   - high surgical risk;  - :  Upper GI series with SB follow-through:    Markedly dilated stomach. No barium was seen to pass through the pylorus, even  on 20 minute delayed imaging, in keeping with outlet obstruction.  - EGD scheduled for 8/15; Severe protein calorie malnutrition:  - progressive weight loss over the past few months - cancer and inability to tolerate PO;   - at risk of further weight loss if unable to tolerate PO;   - discussed with Dietician: patient at high surgical risk, keep NPO for now, if needs to remain NPO for longer than 2-3 days, will initiate IV nutrition (TPN vs PPN);  - at high risk of refeeding syndrome;   - awaiting central line to start TPN;      History of lung cancer:   - patient states he was diagnosed with  lung cancer a few months ago and underwent surgery in

## 2023-08-15 NOTE — PLAN OF CARE
Problem: Discharge Planning  Goal: Discharge to home or other facility with appropriate resources  8/15/2023 1127 by Ranjeet Gonzalez RN  Outcome: Progressing  Flowsheets (Taken 8/15/2023 0800)  Discharge to home or other facility with appropriate resources: Identify barriers to discharge with patient and caregiver  8/15/2023 0127 by Lorelei Muñiz RN  Outcome: Progressing     Problem: Skin/Tissue Integrity  Goal: Absence of new skin breakdown  Description: 1. Monitor for areas of redness and/or skin breakdown  2. Assess vascular access sites hourly  3. Every 4-6 hours minimum:  Change oxygen saturation probe site  4. Every 4-6 hours:  If on nasal continuous positive airway pressure, respiratory therapy assess nares and determine need for appliance change or resting period.   8/15/2023 1127 by Ranjeet Gonzalez RN  Outcome: Progressing  8/15/2023 0127 by Lorelei Muñiz RN  Outcome: Progressing     Problem: Safety - Adult  Goal: Free from fall injury  8/15/2023 1127 by Ranjeet Gonzalez RN  Outcome: Progressing  8/15/2023 0127 by Lorelei Muñiz RN  Outcome: Progressing     Problem: Cardiovascular - Adult  Goal: Maintains optimal cardiac output and hemodynamic stability  Outcome: Progressing  Flowsheets (Taken 8/15/2023 0800)  Maintains optimal cardiac output and hemodynamic stability: Monitor blood pressure and heart rate  Goal: Absence of cardiac dysrhythmias or at baseline  Outcome: Progressing  Flowsheets (Taken 8/15/2023 0800)  Absence of cardiac dysrhythmias or at baseline: Monitor cardiac rate and rhythm     Problem: Gastrointestinal - Adult  Goal: Minimal or absence of nausea and vomiting  Outcome: Progressing  Flowsheets (Taken 8/15/2023 0800)  Minimal or absence of nausea and vomiting:   Administer IV fluids as ordered to ensure adequate hydration   Maintain NPO status until nausea and vomiting are resolved   Administer ordered antiemetic medications as needed  Goal:

## 2023-08-15 NOTE — PROGRESS NOTES
Mr. Resa Peabody feels hungry today. No other complaints. Tm 98.4 Tc 97.9 HR: 54 BP: 92/73 Resp Rate: 20 100% sat on room air. Intake/Output Summary (Last 24 hours) at 8/15/2023 0957  Last data filed at 8/15/2023 2841  Gross per 24 hour   Intake --   Output 1100 ml   Net -1100 ml   Exam: Cor: RRR. Lungs: Bilateral breath sounds. Abd: Soft. Non distended. Non tender. No guarding or rebound. Labs:   Recent Results (from the past 12 hour(s))   Extra Tubes Hold    Collection Time: 08/15/23  3:45 AM   Result Value Ref Range    Specimen HOld 1LAV,1SST     Comment:        Add-on orders for these samples will be processed based on acceptable specimen integrity and analyte stability, which may vary by analyte. Mr. Resa Peabody is a 76 yo man with a gastric outlet obstruction. GI input - noted. EGD today. NPO for now. IVF at 50 ml per hour. Protonix 40 mg q 12 hours as ordered. Needs nutritional support via TPN. Plans per Dr. Mary Robertson.

## 2023-08-15 NOTE — PROGRESS NOTES
Called by RN after patient returned from the procedure and now wants to go home. He apparently called several people to come pick him up. He tells me that he has nurses living next door to him and they will help him, give him small amounts of shakes to drink and that is how he is getting his nutrition. Per EGD, there were residual solids and liquids in the stomach (patient has not had any solid food since admission). Patient has a possible friable mass with oozing in the duodenal bulb with obstruction, biopsies obtained. The recommendation is for patient to stay NPO. I explained to the patient that he cannot eat or drink, because the food and/or liquids cannot pass through, he will vomit whatever he takes in. Patient remains adamant that he is going home. I reached out to patient's niece Pepito Ybarra. Patient gave me permission to call her, but stated \"she cannot stop me\". I explained to Pepito Ybarra the results of EGD and that he should not leave the hospital at this time, it is not safe. Per Pepito Ybarra, patient is disoriented; she will call him and talk to him. Discussed with RN.     16:20: Patient agrees to stay tonight;    Appreciate CM assistance;;   Consult Palliative Medicine;

## 2023-08-15 NOTE — ANESTHESIA POSTPROCEDURE EVALUATION
Department of Anesthesiology  Postprocedure Note    Patient: Devonte Gomez  MRN: [id number]  YOB: 1955  Date of evaluation: 8/15/2023      Procedure Summary     Date: 08/15/23 Room / Location: [facility] ENDO  / [facility] ENDOSCOPY    Anesthesia Start: 1308 Anesthesia Stop: 9003    Procedure: EGD ESOPHAGOGASTRODUODENOSCOPY Diagnosis:       Gastric outlet obstruction      (Gastric outlet obstruction [K31.1])    Surgeons: Rene Guaman MD Responsible Provider: Jose Walker MD    Anesthesia Type: MAC ASA Status: 2          Anesthesia Type: No value filed.     Dania Phase I: Dania Score: 10    Dania Phase II: Dania Score: 10      Anesthesia Post Evaluation    Patient location during evaluation: PACU  Patient participation: complete - patient participated  Level of consciousness: awake  Airway patency: patent  Nausea & Vomiting: no nausea  Complications: no  Cardiovascular status: blood pressure returned to baseline and hemodynamically stable  Respiratory status: acceptable  Hydration status: stable Notification [facility] Pt  Pt location at SNF: [address]    Assessment: Pt had PT/INR drawn  Results:  INR: 2.1    Current dose of Coumadin: 3mg 2/23. 2.5mg 2/25  Last INR: 2/23/22 1.6    Any bleeding or bruising concerns: none  Any medication changes: none    Anticoag tracking form updated with result    Any recommendations or new orders?

## 2023-08-15 NOTE — CONSULTS
Comprehensive Nutrition Assessment    Type and Reason for Visit: Consult, Reassess    Nutrition Recommendations/Plan:     Recommend checking Phos, K+, Mag. If low, replete prior to start of PN support. Recommend giving IV thiamine now  Given severity of malnutrition and refeeding risk, recommend cautious initiation and advancement of PN support daily pending labs  Day 1 - TPN @ 75 mL/hr with 120 gm dex, 45 gm AA, include thiamine  Day 2 - TPN @ 75 mL/hr with 150 gm dex, 65 gm AA, include thiamine  Day 3 - TPN @ 75 mL/hr with 200 gm dex, 85 gm AA, include thiamine    Please D/C additional D5 with start of PN support.   Adjust to non-dex containing   Please do not order lipids yet    RD to re-evaluate in 2-3 days and provide additional recs for advancing TPN further to goal (100 gm AA, 300 gm dex @ 75 mL/hr + 250 mL 20% lipids daily)         Malnutrition Assessment:  Malnutrition Status:  Severe malnutrition (08/11/23 1522)    Context:  Chronic Illness     Findings of the 6 clinical characteristics of malnutrition:  Energy Intake:  75% or less estimated energy requirements for 1 month or longer  Weight Loss:  Greater than 10% over 6 months     Body Fat Loss:  Severe body fat loss Triceps, Fat Overlying Ribs   Muscle Mass Loss:  Severe muscle mass loss Temples (temporalis), Clavicles (pectoralis & deltoids), Calf (gastrocnemius), Hand (interosseous)  Fluid Accumulation:  No significant fluid accumulation     Strength:  Not Performed       Nutrition Assessment:    Past Medical History:   Diagnosis Date    BPH (benign prostatic hyperplasia)     Cannabis dependence in remission ([address])     Cocaine dependence in remission ([address])     Depression     Erectile dysfunction     GERD (gastroesophageal reflux disease)     Hepatitis C virus     HTN (hypertension)     Lung cancer (HCC)     Polysubstance abuse ([address])     Primary open angle glaucoma     Prostate cancer (HCC)     Unilateral primary osteoarthritis, right knee      Pt

## 2023-08-15 NOTE — PROGRESS NOTES

## 2023-08-16 PROBLEM — Z51.5 PALLIATIVE CARE BY SPECIALIST: Status: ACTIVE | Noted: 2023-08-16

## 2023-08-16 PROBLEM — E43 SEVERE PROTEIN-CALORIE MALNUTRITION (HCC): Status: ACTIVE | Noted: 2023-08-16

## 2023-08-16 LAB
ALBUMIN SERPL-MCNC: 2.3 G/DL (ref 3.5–5)
ALBUMIN/GLOB SERPL: 0.9 (ref 1.1–2.2)
ALP SERPL-CCNC: 96 U/L (ref 45–117)
ALT SERPL-CCNC: 74 U/L (ref 12–78)
ANION GAP SERPL CALC-SCNC: 5 MMOL/L (ref 5–15)
AST SERPL-CCNC: 80 U/L (ref 15–37)
BILIRUB DIRECT SERPL-MCNC: 0.4 MG/DL (ref 0–0.2)
BILIRUB SERPL-MCNC: 0.9 MG/DL (ref 0.2–1)
BUN SERPL-MCNC: 34 MG/DL (ref 6–20)
BUN/CREAT SERPL: 52 (ref 12–20)
CALCIUM SERPL-MCNC: 7.9 MG/DL (ref 8.5–10.1)
CHLORIDE SERPL-SCNC: 116 MMOL/L (ref 97–108)
CO2 SERPL-SCNC: 30 MMOL/L (ref 21–32)
CREAT SERPL-MCNC: 0.65 MG/DL (ref 0.7–1.3)
ERYTHROCYTE [DISTWIDTH] IN BLOOD BY AUTOMATED COUNT: 14.1 % (ref 11.5–14.5)
GLOBULIN SER CALC-MCNC: 2.6 G/DL (ref 2–4)
GLUCOSE BLD STRIP.AUTO-MCNC: 89 MG/DL (ref 65–117)
GLUCOSE SERPL-MCNC: 82 MG/DL (ref 65–100)
HCT VFR BLD AUTO: 30.4 % (ref 36.6–50.3)
HGB BLD-MCNC: 9.9 G/DL (ref 12.1–17)
MAGNESIUM SERPL-MCNC: 2 MG/DL (ref 1.6–2.4)
MCH RBC QN AUTO: 35.2 PG (ref 26–34)
MCHC RBC AUTO-ENTMCNC: 32.6 G/DL (ref 30–36.5)
MCV RBC AUTO: 108.2 FL (ref 80–99)
NRBC # BLD: 0 K/UL (ref 0–0.01)
NRBC BLD-RTO: 0 PER 100 WBC
PHOSPHATE SERPL-MCNC: 3.2 MG/DL (ref 2.6–4.7)
PLATELET # BLD AUTO: 128 K/UL (ref 150–400)
PMV BLD AUTO: 10.6 FL (ref 8.9–12.9)
POTASSIUM SERPL-SCNC: 3.2 MMOL/L (ref 3.5–5.1)
PROT SERPL-MCNC: 4.9 G/DL (ref 6.4–8.2)
RBC # BLD AUTO: 2.81 M/UL (ref 4.1–5.7)
SERVICE CMNT-IMP: NORMAL
SODIUM SERPL-SCNC: 151 MMOL/L (ref 136–145)
TRIGL SERPL-MCNC: 17 MG/DL
WBC # BLD AUTO: 2.6 K/UL (ref 4.1–11.1)

## 2023-08-16 PROCEDURE — 84478 ASSAY OF TRIGLYCERIDES: CPT

## 2023-08-16 PROCEDURE — 83735 ASSAY OF MAGNESIUM: CPT

## 2023-08-16 PROCEDURE — 6370000000 HC RX 637 (ALT 250 FOR IP): Performed by: SURGERY

## 2023-08-16 PROCEDURE — 2060000000 HC ICU INTERMEDIATE R&B

## 2023-08-16 PROCEDURE — 85027 COMPLETE CBC AUTOMATED: CPT

## 2023-08-16 PROCEDURE — 6360000002 HC RX W HCPCS: Performed by: INTERNAL MEDICINE

## 2023-08-16 PROCEDURE — 36415 COLL VENOUS BLD VENIPUNCTURE: CPT

## 2023-08-16 PROCEDURE — 6360000002 HC RX W HCPCS: Performed by: SURGERY

## 2023-08-16 PROCEDURE — 2580000003 HC RX 258: Performed by: INTERNAL MEDICINE

## 2023-08-16 PROCEDURE — 80048 BASIC METABOLIC PNL TOTAL CA: CPT

## 2023-08-16 PROCEDURE — 82962 GLUCOSE BLOOD TEST: CPT

## 2023-08-16 PROCEDURE — 80076 HEPATIC FUNCTION PANEL: CPT

## 2023-08-16 PROCEDURE — C9113 INJ PANTOPRAZOLE SODIUM, VIA: HCPCS | Performed by: INTERNAL MEDICINE

## 2023-08-16 PROCEDURE — 2500000003 HC RX 250 WO HCPCS: Performed by: SURGERY

## 2023-08-16 PROCEDURE — A4216 STERILE WATER/SALINE, 10 ML: HCPCS | Performed by: INTERNAL MEDICINE

## 2023-08-16 PROCEDURE — 99233 SBSQ HOSP IP/OBS HIGH 50: CPT | Performed by: NURSE PRACTITIONER

## 2023-08-16 PROCEDURE — 84100 ASSAY OF PHOSPHORUS: CPT

## 2023-08-16 PROCEDURE — 2580000003 HC RX 258: Performed by: SURGERY

## 2023-08-16 PROCEDURE — 99231 SBSQ HOSP IP/OBS SF/LOW 25: CPT | Performed by: SURGERY

## 2023-08-16 RX ORDER — DEXTROSE MONOHYDRATE 50 MG/ML
INJECTION, SOLUTION INTRAVENOUS CONTINUOUS
Status: DISCONTINUED | OUTPATIENT
Start: 2023-08-16 | End: 2023-08-17

## 2023-08-16 RX ADMIN — POTASSIUM CHLORIDE: 2 INJECTION, SOLUTION, CONCENTRATE INTRAVENOUS at 19:21

## 2023-08-16 RX ADMIN — SODIUM CHLORIDE, PRESERVATIVE FREE 10 ML: 5 INJECTION INTRAVENOUS at 20:24

## 2023-08-16 RX ADMIN — PANTOPRAZOLE SODIUM 40 MG: 40 INJECTION, POWDER, LYOPHILIZED, FOR SOLUTION INTRAVENOUS at 22:13

## 2023-08-16 RX ADMIN — PANTOPRAZOLE SODIUM 40 MG: 40 INJECTION, POWDER, LYOPHILIZED, FOR SOLUTION INTRAVENOUS at 10:48

## 2023-08-16 RX ADMIN — HYDROMORPHONE HYDROCHLORIDE 1 MG: 1 INJECTION, SOLUTION INTRAMUSCULAR; INTRAVENOUS; SUBCUTANEOUS at 19:21

## 2023-08-16 RX ADMIN — ONDANSETRON 4 MG: 4 TABLET, ORALLY DISINTEGRATING ORAL at 17:07

## 2023-08-16 RX ADMIN — HYDROMORPHONE HYDROCHLORIDE 1 MG: 1 INJECTION, SOLUTION INTRAMUSCULAR; INTRAVENOUS; SUBCUTANEOUS at 17:07

## 2023-08-16 RX ADMIN — HYDROMORPHONE HYDROCHLORIDE 1 MG: 1 INJECTION, SOLUTION INTRAMUSCULAR; INTRAVENOUS; SUBCUTANEOUS at 10:48

## 2023-08-16 RX ADMIN — HYDROMORPHONE HYDROCHLORIDE 1 MG: 1 INJECTION, SOLUTION INTRAMUSCULAR; INTRAVENOUS; SUBCUTANEOUS at 08:10

## 2023-08-16 RX ADMIN — DEXTROSE MONOHYDRATE: 50 INJECTION, SOLUTION INTRAVENOUS at 10:48

## 2023-08-16 RX ADMIN — SODIUM CHLORIDE, PRESERVATIVE FREE 10 ML: 5 INJECTION INTRAVENOUS at 08:10

## 2023-08-16 RX ADMIN — THIAMINE HYDROCHLORIDE 100 MG: 100 INJECTION, SOLUTION INTRAMUSCULAR; INTRAVENOUS at 14:24

## 2023-08-16 RX ADMIN — HYDROMORPHONE HYDROCHLORIDE 1 MG: 1 INJECTION, SOLUTION INTRAMUSCULAR; INTRAVENOUS; SUBCUTANEOUS at 14:23

## 2023-08-16 RX ADMIN — SODIUM CHLORIDE, PRESERVATIVE FREE 10 ML: 5 INJECTION INTRAVENOUS at 20:25

## 2023-08-16 ASSESSMENT — PAIN DESCRIPTION - LOCATION
LOCATION: NECK
LOCATION: NECK
LOCATION: NECK;ABDOMEN

## 2023-08-16 ASSESSMENT — PAIN SCALES - GENERAL
PAINLEVEL_OUTOF10: 7
PAINLEVEL_OUTOF10: 7
PAINLEVEL_OUTOF10: 5

## 2023-08-16 ASSESSMENT — PAIN DESCRIPTION - ORIENTATION
ORIENTATION: RIGHT
ORIENTATION: RIGHT

## 2023-08-16 NOTE — ACP (ADVANCE CARE PLANNING)
Advance Care Planning     Advance Care Planning (ACP) Physician/NP/PA Conversation    Will continue discussion once EGD path report is complete for further discussion      Date of Conversation: 8/16/2023  Conducted with: Patient with Decision Making Capacity  Also present on the phone was the patient's niece, Jyotsna  Decision Maker:    Primary Decision Maker: Lamin Dinero - Niece/Nephew - [phone]    Secondary Decision Maker: Efrain Wall - Brother/Sister - [phone]  Click here to complete Healthcare Decision Makers including selection of the Healthcare Decision Maker Relationship (ie \"Primary\"). Today we documented desired Decision Maker(s), who is (are) NOT Legal Next of Kin. ACP documents are required for decision maker authority. Care Preferences:    Resuscitation: \"In the event your heart stopped as a result of an underlying serious health condition, would you want attempts made to restart your heart, or would you prefer a natural death? \"  Yes, attempt to resuscitate.     resuscitation preferences    Conversation Outcomes / Follow-Up Plan:  ACP complete - no further action today  Reviewed DNR/DNI and patient elects Full Code (Attempt Resuscitation)    Length of Voluntary ACP Conversation in minutes:  30 minutes    MIRANDA Mclean - NP

## 2023-08-16 NOTE — PROGRESS NOTES
Bedside shift change report given to Agustin Mcfarlane RN (oncoming nurse) by AYESHA Dale (offgoing nurse). Report included the following information Nurse Handoff Report, Adult Overview, Intake/Output, MAR, Recent Results, and Cardiac Rhythm Sinus braulio-NSR .

## 2023-08-16 NOTE — ANESTHESIA PROCEDURE NOTES
Central Venous Line:    A central venous line was placed using ultrasound guidance, in the pre-op for the following indication(s): central venous access. 8/15/2023 8:40 PM8/15/2023 8:50 PM    Sterility preparation included the following: hand hygiene performed prior to procedure, maximum sterile barriers used and sterile technique used to drape from head to toe. The patient was placed in Trendelenburg position. The right internal jugular vein was prepped. The site was prepped with Chloraprep. A 7 Fr (size), 16 (length), triple lumen was placed. During the procedure, the following specific steps were taken: target vein identified, needle advanced into vein and blood aspirated and guidewire advanced into vein. Intravenous verification was obtained by ultrasound, venous blood return and manometry. Post insertion care included: all ports aspirated, all ports flushed easily, guidewire removed intact, Biopatch applied, line sutured in place and dressing applied. During the procedure the patient experienced: patient tolerated procedure well with no complications.       Outcomes: uncomplicated  Real-time US image taken/store: yes  Anesthesia type: local..No  Staffing  Performed: Anesthesiologist   Anesthesiologist: Samaria Melendrez DO  Preanesthetic Checklist  Completed: patient identified, IV checked, site marked, risks and benefits discussed, surgical/procedural consents, equipment checked, pre-op evaluation, timeout performed, anesthesia consent given, oxygen available, monitors applied/VS acknowledged and fire risk safety assessment completed and verbalized

## 2023-08-16 NOTE — PROGRESS NOTES
Palliative Medicine-     The Palliative Medicine SW was notified by Julián Lala NP, that the patient was interested in completing a Financial POA-     Given patient's cancer history/diagnosis- SW will refer him to [facility] for assistance. The SW attempted to meet with the patient at bedside- he was sleeping soundly, he did not awake to my voice. SW left resource for [facility] at bedside, also notified - Beto Jones will attempt to follow-up with patient later today vs. Tomorrow as able and appropriate.      Thank you for including Palliative Medicine in the care of  [facility], [place], [place]

## 2023-08-16 NOTE — PROGRESS NOTES
Spiritual Care Assessment/Progress Note  [facility]    Name: Marisol Wise MRN: [id number]    Age: 77 y.o. Sex: male   Language: English     Date: 8/16/2023            Total Time Calculated: 25 min              Spiritual Assessment begun in [facility]  Service Provided For[de-identified] Patient  Referral/Consult From[de-identified] Palliative Care  Encounter Overview/Reason : Palliative Care    Spiritual beliefs:      [x] Involved in a cedric tradition/spiritual practice: Nelson      [] Supported by a cedric community:      [] Claims no spiritual orientation:      [] Seeking spiritual identity:           [] Adheres to an individual form of spirituality:      [] Not able to assess:                Identified resources for coping and support system:   Support System: Family members, Friends/neighbors       [] Prayer                  [] Devotional reading               [] Music                  [] Guided Imagery     [] Pet visits                                        [] Other: (COMMENT)     Specific area/focus of visit   Encounter:    Crisis:    Spiritual/Emotional needs:    Ritual, Rites and Sacraments:    Grief, Loss, and Adjustments: Type: Adjustment to illness, Life Adjustments  Ethics/Mediation:    Behavioral Health:    Palliative Care: Type: Palliative Care, Initial/Spiritual Assessment  Advance Care Planning:      Plan/Referrals: Other (Comment) (Please contact spiritual care for future services.)    Visited patient for palliative initial spiritual assessment. His chart was consulted prior to the visit. He reported feeling somewhat better this morning. He lives alone and has seven dogs which are very important to him. Friends are attending to them while he is here. His main goal currently is to be able to return to his home as soon as possible.    Chaplain Clay, MDiv, MS, [facility]

## 2023-08-16 NOTE — PROGRESS NOTES
[facility]                                                                                          Hospitalist Progress Note  Yenifer Sesay MD  Answering service: [phone] OR [phone] from in house phone        Date of Service:  2023  NAME:  Radha Poole  :  1955  MRN:  [id number]       Admission Summary:   Patient is a 78 yo M who was transferred to [facility] from [facility] for management of gastric emphysema. Interval history / Subjective:   Central line placed last night and patient was started on TPN. He decided he will stay in the hospital for now. Assessment & Plan:        Gastric emphysema:  - patient presents with abdominal pain, distension, inability to tolerate PO, significant weight loss;   - possible etiology is ischemia, infection less likely;   - keep patient NPO;  -NGT;  - IVFs;   - IV PPI;  - Surgery consult appreciated;   - GI consult;   - empiric IV Abx: Zosyn;   - monitor closely, as patient is at high risk of further decline; he was instructed to alert the nursing staff if his pain worsens;   - high surgical risk;  - :  Upper GI series with SB follow-through:    Markedly dilated stomach. No barium was seen to pass through the pylorus, even  on 20 minute delayed imaging, in keeping with outlet obstruction.  - EGD scheduled for 8/15;  -EGD:  Stomach: Large amount of fluid and barium along with some solid food. Suctioned about 700 ml. Could not clear everything. The stomach was noted to be quite distended with U shape requiring a large loop to get to the pylorus and difficulty staying intubated in the bulb. Duodenum/jejunum: Unable to traverse beyond the bulb with evidence of possible friable mass with oozing on contact. Biopsies performed.     -Follow-up pathology results;     Severe protein calorie malnutrition:  - progressive weight loss over the past few months - cancer and inability to tolerate 4 mg  4 mg Oral Q6H PRN    sodium chloride flush 0.9 % injection 5-40 mL  5-40 mL IntraVENous 2 times per day    sodium chloride flush 0.9 % injection 5-40 mL  5-40 mL IntraVENous PRN    polyethylene glycol (GLYCOLAX) packet 17 g  17 g Oral Daily PRN    naloxone (NARCAN) injection 0.4 mg  0.4 mg IntraVENous PRN    pantoprazole (PROTONIX) 40 mg in sodium chloride (PF) 0.9 % 10 mL injection  40 mg IntraVENous Q12H    HYDROmorphone HCl PF (DILAUDID) injection 1 mg  1 mg IntraVENous Q3H PRN     ______________________________________________________________________  EXPECTED LENGTH OF STAY: Unable to retrieve estimated LOS  ACTUAL LENGTH OF STAY:          Kaur Castañeda MD

## 2023-08-16 NOTE — PROGRESS NOTES
Mr. Judy Conner has no c/o today. Tm 98.4 HR: 58 BP: 135/98 Resp Rate: 25 98% sat on room air. Intake/Output Summary (Last 24 hours) at 8/16/2023 0912  Last data filed at 8/15/2023 1341  Gross per 24 hour   Intake 250 ml   Output 700 ml   Net -450 ml   Exam: Cor: RRR. Lungs: Bilateral breath sounds. Abd: Soft. Non tender. No guarding or rebound. Non distended.   Labs:   Recent Results (from the past 12 hour(s))   Basic Metabolic Panel    Collection Time: 08/16/23  1:10 AM   Result Value Ref Range    Sodium 151 (H) 136 - 145 mmol/L    Potassium 3.2 (L) 3.5 - 5.1 mmol/L    Chloride 116 (H) 97 - 108 mmol/L    CO2 30 21 - 32 mmol/L    Anion Gap 5 5 - 15 mmol/L    Glucose 82 65 - 100 mg/dL    BUN 34 (H) 6 - 20 MG/DL    Creatinine 0.65 (L) 0.70 - 1.30 MG/DL    Bun/Cre Ratio 52 (H) 12 - 20      Est, Glom Filt Rate >60 >60 ml/min/1.73m2    Calcium 7.9 (L) 8.5 - 10.1 MG/DL   CBC    Collection Time: 08/16/23  1:10 AM   Result Value Ref Range    WBC 2.6 (L) 4.1 - 11.1 K/uL    RBC 2.81 (L) 4.10 - 5.70 M/uL    Hemoglobin 9.9 (L) 12.1 - 17.0 g/dL    Hematocrit 30.4 (L) 36.6 - 50.3 %    .2 (H) 80.0 - 99.0 FL    MCH 35.2 (H) 26.0 - 34.0 PG    MCHC 32.6 30.0 - 36.5 g/dL    RDW 14.1 11.5 - 14.5 %    Platelets 058 (L) 595 - 400 K/uL    MPV 10.6 8.9 - 12.9 FL    Nucleated RBCs 0.0 0  WBC    nRBC 0.00 0.00 - 0.01 K/uL   Hepatic Function Panel    Collection Time: 08/16/23  1:10 AM   Result Value Ref Range    Total Protein 4.9 (L) 6.4 - 8.2 g/dL    Albumin 2.3 (L) 3.5 - 5.0 g/dL    Globulin 2.6 2.0 - 4.0 g/dL    Albumin/Globulin Ratio 0.9 (L) 1.1 - 2.2      Total Bilirubin 0.9 0.2 - 1.0 MG/DL    Bilirubin, Direct 0.4 (H) 0.0 - 0.2 MG/DL    Alk Phosphatase 96 45 - 117 U/L    AST 80 (H) 15 - 37 U/L    ALT 74 12 - 78 U/L   Magnesium    Collection Time: 08/16/23  1:10 AM   Result Value Ref Range    Magnesium 2.0 1.6 - 2.4 mg/dL   Phosphorus    Collection Time: 08/16/23  1:10 AM

## 2023-08-16 NOTE — PROGRESS NOTES
78: MD made aware of potassium 3.2 and sodium level 151 from todays lab work. No new orders received at this time.

## 2023-08-16 NOTE — PROGRESS NOTES
Bedside shift change report given to April, RN (oncoming nurse) by Kira Harmon RN and Wesley Zelaya RN (offgoing nurse).  Report included the following information Nurse Handoff Report

## 2023-08-17 LAB
ALBUMIN SERPL-MCNC: 2.3 G/DL (ref 3.5–5)
ALBUMIN/GLOB SERPL: 0.9 (ref 1.1–2.2)
ALP SERPL-CCNC: 100 U/L (ref 45–117)
ALT SERPL-CCNC: 91 U/L (ref 12–78)
ANION GAP SERPL CALC-SCNC: 4 MMOL/L (ref 5–15)
AST SERPL-CCNC: 88 U/L (ref 15–37)
BILIRUB DIRECT SERPL-MCNC: 0.3 MG/DL (ref 0–0.2)
BILIRUB SERPL-MCNC: 0.7 MG/DL (ref 0.2–1)
BUN SERPL-MCNC: 27 MG/DL (ref 6–20)
BUN/CREAT SERPL: 53 (ref 12–20)
CALCIUM SERPL-MCNC: 7.9 MG/DL (ref 8.5–10.1)
CHLORIDE SERPL-SCNC: 110 MMOL/L (ref 97–108)
CO2 SERPL-SCNC: 29 MMOL/L (ref 21–32)
COMMENT:: NORMAL
CREAT SERPL-MCNC: 0.51 MG/DL (ref 0.7–1.3)
GLOBULIN SER CALC-MCNC: 2.6 G/DL (ref 2–4)
GLUCOSE BLD STRIP.AUTO-MCNC: 100 MG/DL (ref 65–117)
GLUCOSE BLD STRIP.AUTO-MCNC: 102 MG/DL (ref 65–117)
GLUCOSE BLD STRIP.AUTO-MCNC: 144 MG/DL (ref 65–117)
GLUCOSE BLD STRIP.AUTO-MCNC: 36 MG/DL (ref 65–117)
GLUCOSE BLD STRIP.AUTO-MCNC: 39 MG/DL (ref 65–117)
GLUCOSE BLD STRIP.AUTO-MCNC: 59 MG/DL (ref 65–117)
GLUCOSE BLD STRIP.AUTO-MCNC: 66 MG/DL (ref 65–117)
GLUCOSE BLD STRIP.AUTO-MCNC: 71 MG/DL (ref 65–117)
GLUCOSE BLD STRIP.AUTO-MCNC: 71 MG/DL (ref 65–117)
GLUCOSE BLD STRIP.AUTO-MCNC: 85 MG/DL (ref 65–117)
GLUCOSE SERPL-MCNC: 91 MG/DL (ref 65–100)
MAGNESIUM SERPL-MCNC: 2.1 MG/DL (ref 1.6–2.4)
PHOSPHATE SERPL-MCNC: 1.9 MG/DL (ref 2.6–4.7)
POTASSIUM SERPL-SCNC: 3.5 MMOL/L (ref 3.5–5.1)
PROT SERPL-MCNC: 4.9 G/DL (ref 6.4–8.2)
SERVICE CMNT-IMP: ABNORMAL
SERVICE CMNT-IMP: NORMAL
SODIUM SERPL-SCNC: 143 MMOL/L (ref 136–145)
SPECIMEN HOLD: NORMAL

## 2023-08-17 PROCEDURE — 6360000002 HC RX W HCPCS: Performed by: INTERNAL MEDICINE

## 2023-08-17 PROCEDURE — 80048 BASIC METABOLIC PNL TOTAL CA: CPT

## 2023-08-17 PROCEDURE — 80076 HEPATIC FUNCTION PANEL: CPT

## 2023-08-17 PROCEDURE — 2580000003 HC RX 258: Performed by: NURSE PRACTITIONER

## 2023-08-17 PROCEDURE — 2500000003 HC RX 250 WO HCPCS: Performed by: SURGERY

## 2023-08-17 PROCEDURE — 2580000003 HC RX 258: Performed by: INTERNAL MEDICINE

## 2023-08-17 PROCEDURE — 2580000003 HC RX 258: Performed by: SURGERY

## 2023-08-17 PROCEDURE — 82962 GLUCOSE BLOOD TEST: CPT

## 2023-08-17 PROCEDURE — 84100 ASSAY OF PHOSPHORUS: CPT

## 2023-08-17 PROCEDURE — 2060000000 HC ICU INTERMEDIATE R&B

## 2023-08-17 PROCEDURE — 83735 ASSAY OF MAGNESIUM: CPT

## 2023-08-17 PROCEDURE — C9113 INJ PANTOPRAZOLE SODIUM, VIA: HCPCS | Performed by: INTERNAL MEDICINE

## 2023-08-17 PROCEDURE — 6360000002 HC RX W HCPCS: Performed by: SURGERY

## 2023-08-17 PROCEDURE — 99231 SBSQ HOSP IP/OBS SF/LOW 25: CPT | Performed by: SURGERY

## 2023-08-17 PROCEDURE — A4216 STERILE WATER/SALINE, 10 ML: HCPCS | Performed by: INTERNAL MEDICINE

## 2023-08-17 PROCEDURE — 36415 COLL VENOUS BLD VENIPUNCTURE: CPT

## 2023-08-17 RX ORDER — DEXTROSE MONOHYDRATE 50 MG/ML
INJECTION, SOLUTION INTRAVENOUS CONTINUOUS
Status: DISCONTINUED | OUTPATIENT
Start: 2023-08-17 | End: 2023-08-18

## 2023-08-17 RX ADMIN — POTASSIUM CHLORIDE: 2 INJECTION, SOLUTION, CONCENTRATE INTRAVENOUS at 18:45

## 2023-08-17 RX ADMIN — DEXTROSE MONOHYDRATE 125 ML: 100 INJECTION, SOLUTION INTRAVENOUS at 15:21

## 2023-08-17 RX ADMIN — POTASSIUM PHOSPHATE, MONOBASIC AND POTASSIUM PHOSPHATE, DIBASIC 30 MMOL: 224; 236 INJECTION, SOLUTION, CONCENTRATE INTRAVENOUS at 11:10

## 2023-08-17 RX ADMIN — SODIUM CHLORIDE, PRESERVATIVE FREE 10 ML: 5 INJECTION INTRAVENOUS at 09:22

## 2023-08-17 RX ADMIN — DEXTROSE MONOHYDRATE: 50 INJECTION, SOLUTION INTRAVENOUS at 02:49

## 2023-08-17 RX ADMIN — SODIUM CHLORIDE, PRESERVATIVE FREE 10 ML: 5 INJECTION INTRAVENOUS at 20:47

## 2023-08-17 RX ADMIN — SODIUM CHLORIDE, PRESERVATIVE FREE 10 ML: 5 INJECTION INTRAVENOUS at 09:21

## 2023-08-17 RX ADMIN — PANTOPRAZOLE SODIUM 40 MG: 40 INJECTION, POWDER, LYOPHILIZED, FOR SOLUTION INTRAVENOUS at 11:10

## 2023-08-17 NOTE — PROGRESS NOTES
Bedside shift change report given to Rebecca Goldsmith RN (oncoming nurse) by Ariana Del Toro RN and Pau Grossman RN (offgoing nurse). Report included the following information Nurse Handoff Report.

## 2023-08-17 NOTE — PROGRESS NOTES
Mr. Bayron Fernandez has no c/o today. Tm 98.4 Tc 98.1 HR: 43 BP: 102/76 Resp Rate: 16 98% sat on room air. Intake/Output Summary (Last 24 hours) at 8/17/2023 0754  Last data filed at 8/16/2023 1846  Gross per 24 hour   Intake --   Output 1150 ml   Net -1150 ml   Exam: Cor: RRR. Lungs: Bilateral breath sounds. Abd: Soft. Non distended. Non tender. No guarding or rebound.   Labs:   Recent Results (from the past 12 hour(s))   POCT Glucose    Collection Time: 08/17/23 12:07 AM   Result Value Ref Range    POC Glucose 85 65 - 117 mg/dL    Performed by: Tanya Cobb    Basic Metabolic Panel    Collection Time: 08/17/23  2:32 AM   Result Value Ref Range    Sodium 143 136 - 145 mmol/L    Potassium 3.5 3.5 - 5.1 mmol/L    Chloride 110 (H) 97 - 108 mmol/L    CO2 29 21 - 32 mmol/L    Anion Gap 4 (L) 5 - 15 mmol/L    Glucose 91 65 - 100 mg/dL    BUN 27 (H) 6 - 20 MG/DL    Creatinine 0.51 (L) 0.70 - 1.30 MG/DL    Bun/Cre Ratio 53 (H) 12 - 20      Est, Glom Filt Rate >60 >60 ml/min/1.73m2    Calcium 7.9 (L) 8.5 - 10.1 MG/DL   Hepatic Function Panel    Collection Time: 08/17/23  2:32 AM   Result Value Ref Range    Total Protein 4.9 (L) 6.4 - 8.2 g/dL    Albumin 2.3 (L) 3.5 - 5.0 g/dL    Globulin 2.6 2.0 - 4.0 g/dL    Albumin/Globulin Ratio 0.9 (L) 1.1 - 2.2      Total Bilirubin 0.7 0.2 - 1.0 MG/DL    Bilirubin, Direct 0.3 (H) 0.0 - 0.2 MG/DL    Alk Phosphatase 100 45 - 117 U/L    AST 88 (H) 15 - 37 U/L    ALT 91 (H) 12 - 78 U/L   Magnesium    Collection Time: 08/17/23  2:32 AM   Result Value Ref Range    Magnesium 2.1 1.6 - 2.4 mg/dL   Phosphorus    Collection Time: 08/17/23  2:32 AM   Result Value Ref Range    Phosphorus 1.9 (L) 2.6 - 4.7 MG/DL   Extra Tubes Hold    Collection Time: 08/17/23  2:32 AM   Result Value Ref Range    Specimen HOld 1LAV     Comment:        Add-on orders for these samples will be processed based on acceptable specimen integrity and analyte stability,

## 2023-08-17 NOTE — PROGRESS NOTES
Comprehensive Nutrition Assessment    Type and Reason for Visit: Reassess    Nutrition Recommendations/Plan:     Replete Phos - recommend rechecking before advancing TPN    Updated TPN advancement recs as below:    Day 3 TPN (8/17): Continue 150 gm dex. Advance to 85 gm AA (called and spoke with pharmacy)  Day 4 TPN (8/18) advance to 200 gm dex and start 250 mL 20% lipids daily   Day 5 TPN (8/19) advance to 250 gm dex with 100 gm AA  Day 6 TPN (8/20) advance to 300 gm dex, continue 100 gm AA (goal)    TPN goal: 100 gm AA, 300 gm dex @ 75 mL/hr + 250 mL 20% lipids daily         Malnutrition Assessment:  Malnutrition Status:  Severe malnutrition (08/11/23 1522)    Context:  Chronic Illness     Findings of the 6 clinical characteristics of malnutrition:  Energy Intake:  75% or less estimated energy requirements for 1 month or longer  Weight Loss:  Greater than 10% over 6 months     Body Fat Loss:  Severe body fat loss Triceps, Fat Overlying Ribs   Muscle Mass Loss:  Severe muscle mass loss Temples (temporalis), Clavicles (pectoralis & deltoids), Calf (gastrocnemius), Hand (interosseous)  Fluid Accumulation:  No significant fluid accumulation     Strength:  Not Performed       Nutrition Assessment:    Past Medical History:   Diagnosis Date    BPH (benign prostatic hyperplasia)     Cannabis dependence in remission ([address])     Cocaine dependence in remission ([address])     Depression     Erectile dysfunction     GERD (gastroesophageal reflux disease)     Hepatitis C virus     HTN (hypertension)     Lung cancer ([address])     Polysubstance abuse ([address])     Primary open angle glaucoma     Prostate cancer ([address])     Unilateral primary osteoarthritis, right knee      Pt admitted with Gastric emphysema. GI and surgery consulted. GI work-up deferred for now, recommends plans per surgery. Per surgery \"Patient states that he began with vomiting in March 2023, associated with 60# weight loss since that time.  He is not able to eat solid mL/kcal    Nutrition Related Findings:   Edema: None                      Last BM:  (PTA)    Wounds:   Wound Type: None      Current Nutrition Therapies:  Diet: NPO  Nutrition Support: TPN as above      Anthropometric Measures:  Height: 182.9 cm (6')  Ideal Body Weight (IBW): 178 lbs (81 kg)    Admission Body Weight: 110 lb (49.9 kg)  Current Body Weight: 117 lb 7 oz (53.3 kg), 66 % IBW.  Weight Source: Bed Scale  Current BMI (kg/m2): 15.9  Usual Body Weight: 170 lb (77.1 kg)  % Weight Change (Calculated): -34.1  Weight Adjustment For: No Adjustment                 BMI Categories: Underweight (BMI less than 22) age over 72    Weights(Current Encounter) (last 50 days)          Date/Time Weight Weight Method     08/17/23 0347 53.3 kg (117 lb 7 oz) --     08/17/23 0245 53 kg (116 lb 14.4 oz) Bed scale     08/16/23 0512 53.3 kg (117 lb 8 oz) Bed scale     08/15/23 0337 55.2 kg (121 lb 11.8 oz) Bed scale     08/14/23 0715 53.1 kg (117 lb 1.6 oz) Bed scale     08/12/23 0305 48.6 kg (107 lb 1.6 oz) Bed scale     08/11/23 0600 50.8 kg (112 lb 1.6 oz) Bed scale           Nutrition Diagnosis:   Severe malnutrition related to altered GI function, inadequate protein-energy intake as evidenced by Criteria as identified in malnutrition assessment  Inadequate oral intake related to altered GI function as evidenced by NPO or clear liquid status due to medical condition    Nutrition Interventions:   Food and/or Nutrient Delivery: Continue NPO, Continue Current Parenteral Nutrition, Modify Parenteral Nutrition  Nutrition Education/Counseling: No recommendation at this time  Coordination of Nutrition Care: Continue to monitor while inpatient, Interdisciplinary Rounds       Goals:  Previous Goal Met: Progressing toward Goal(s)  Goals: other (specify)  Specify Other Goals: PN support to meet at least 75% of EEN within 4-5 days    Nutrition Monitoring and Evaluation:   Behavioral-Environmental Outcomes: None Identified  Food/Nutrient

## 2023-08-17 NOTE — PLAN OF CARE
Problem: Discharge Planning  Goal: Discharge to home or other facility with appropriate resources  8/17/2023 1018 by Sandy Machuca RN  Outcome: Progressing  8/16/2023 2121 by Demetris Ulloa RN  Outcome: Progressing  8/16/2023 2027 by Cedric Montes RN  Outcome: Progressing  Flowsheets  Taken 8/16/2023 2000 by Demetris Ulloa RN  Discharge to home or other facility with appropriate resources: Identify barriers to discharge with patient and caregiver  Taken 8/16/2023 0808 by Cedric Montes RN  Discharge to home or other facility with appropriate resources:   Identify barriers to discharge with patient and caregiver   Arrange for needed discharge resources and transportation as appropriate     Problem: Skin/Tissue Integrity  Goal: Absence of new skin breakdown  Description: 1. Monitor for areas of redness and/or skin breakdown  2. Assess vascular access sites hourly  3. Every 4-6 hours minimum:  Change oxygen saturation probe site  4. Every 4-6 hours:  If on nasal continuous positive airway pressure, respiratory therapy assess nares and determine need for appliance change or resting period.   8/17/2023 1018 by Sandy Machuca RN  Outcome: Progressing  8/16/2023 2121 by Demetris Ulloa RN  Outcome: Progressing  8/16/2023 2027 by Cedric Montes RN  Outcome: Progressing     Problem: Safety - Adult  Goal: Free from fall injury  8/17/2023 1018 by Sandy Machuca RN  Outcome: Progressing  8/16/2023 2121 by Demetris Ulloa RN  Outcome: Progressing  8/16/2023 2027 by Cedric Montes RN  Outcome: Progressing     Problem: Cardiovascular - Adult  Goal: Maintains optimal cardiac output and hemodynamic stability  8/17/2023 1018 by Sandy Machuca RN  Outcome: Progressing  8/16/2023 2121 by Demetris Ulloa RN  Outcome: Progressing  8/16/2023 2027 by Cedric Montes RN  Outcome: Progressing  Flowsheets  Taken 8/16/2023 2000 by Demetris Ulloa RN  Maintains optimal cardiac

## 2023-08-17 NOTE — PROGRESS NOTES
Sabina Cat MD due to patient BG being 71 and patient dextrose fluids being DC'd. Per MD monitor patient. Patient stable, asymptomatic. 1445 - Spoke to lab due to patients BG at 1129 being 36, a recheck was completed at 1131 and was 71 due to the difference another BG will be drawn now. 1459 - BG rechecked and was 59. Another recheck was completed at 1505 and was 77. Md notified. Dietary called    Charge and RN started dextrose per protocol. Recheck completed at 66 [phone] 70 32, patient hands were cold, Rn stuck patient 3 times before getting blood, BG was 39. Rapid Nurse at bedside with RN, blood drawn from cental line due to hands being cold. All fluids paused Central line flushed  with 20 ml of fluids, 6 ml of blood wasted and 3 ml syringe taken for BG check. recheck was 144. MD aware. Per Rapid nurse take patient BG from central line due to cold hands and possible inaccurate BG readings. MD aware of situation, MD placed dextrose fluids orders, per MD hold the dextrose at this time.

## 2023-08-17 NOTE — PROGRESS NOTES
Palliative Medicine      Code Status: Full Code    Advance Care Planning:    The patient completed AMD naming his niece Saji Rodgers as primary MPOa and his sister Michael Valero as secondary MPOA. Patient / Family Encounter Documentation    Participants (names): Patient    Narrative: The Palliative Medicine SW met with the patient at bedside for psychosocial support, as well as referral for [facility] (referred for support from Palliative NP). The patient initially was resting, but easily awoke- had bright affect w/ SW and engaging. The patient provided psychosocial history (see below) and reflected on how much he loves his 7 days- he assures this SW that he has good friends and neighbors that are caring for them while he is in the hospital.     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 SW discussed [facility] as a resource- he is agreeable for SW to send referral to [facility]. SW called them directly with patient's information for formal referral- they will assist patient with Financial POA documents, as well as refer him to a financial counselor to explore what additional resources the patient may need. The SW provided support, inquired if there was anything patient was worried about- he shares that he isn't worried, that \"God has got me. \" SW acknowledged his cedric as a source of comfort. Palliative Medicine SW will follow along in the patient's case and provide support as needed throughout his hospital stay.      Psychosocial Issues Identified/ Resilience Factors: the patient lives at home prior to admission with his 7 dogs- he reflects on them fondly, he has multiple breeds- Pitbulls, rottweilers, etc. Named Guzman, [place], [facility],

## 2023-08-17 NOTE — PROGRESS NOTES
Bedside shift change report given to Gayatri García (oncoming nurse) by Zaira Fitzgerald RN (offgoing nurse). Report included the following information  Index, Intake/Output, MAR, and Cardiac Rhythm Sinus braulio .       Shift worked:  0730-2000     Shift summary and any significant changes:     Patients sugar has been on the softer side today          Neuro:  Oriented X- 4      Cardiac:  HR- 53  BP- 111/86    Respiratory:  02 Sat- 98%  02 device- RA          GI:  Bowel sounds- active      :  Voiding?- yes   Urine characteristics- yellow        Skin:  Intact?- yes           Access: R Forearm & Triple lumen

## 2023-08-17 NOTE — PLAN OF CARE
Problem: Discharge Planning  Goal: Discharge to home or other facility with appropriate resources  Outcome: Progressing  Flowsheets (Taken 8/16/2023 0808)  Discharge to home or other facility with appropriate resources:   Identify barriers to discharge with patient and caregiver   Arrange for needed discharge resources and transportation as appropriate     Problem: Skin/Tissue Integrity  Goal: Absence of new skin breakdown  Description: 1. Monitor for areas of redness and/or skin breakdown  2. Assess vascular access sites hourly  3. Every 4-6 hours minimum:  Change oxygen saturation probe site  4. Every 4-6 hours:  If on nasal continuous positive airway pressure, respiratory therapy assess nares and determine need for appliance change or resting period.   Outcome: Progressing     Problem: Safety - Adult  Goal: Free from fall injury  Outcome: Progressing     Problem: Cardiovascular - Adult  Goal: Maintains optimal cardiac output and hemodynamic stability  Outcome: Progressing  Flowsheets (Taken 8/16/2023 0808)  Maintains optimal cardiac output and hemodynamic stability:   Monitor blood pressure and heart rate   Monitor urine output and notify Licensed Independent Practitioner for values outside of normal range   Assess for signs of decreased cardiac output   Administer fluid and/or volume expanders as ordered  Goal: Absence of cardiac dysrhythmias or at baseline  Outcome: Progressing  Flowsheets (Taken 8/16/2023 0808)  Absence of cardiac dysrhythmias or at baseline:   Monitor cardiac rate and rhythm   Assess for signs of decreased cardiac output     Problem: Gastrointestinal - Adult  Goal: Minimal or absence of nausea and vomiting  Outcome: Progressing  Flowsheets (Taken 8/16/2023 0808)  Minimal or absence of nausea and vomiting:   Administer IV fluids as ordered to ensure adequate hydration   Maintain NPO status until nausea and vomiting are resolved  Goal: Maintains or returns to baseline bowel level:   Encourage patient to monitor pain and request assistance   Assess pain using appropriate pain scale   Administer analgesics based on type and severity of pain and evaluate response     Problem: Nutrition Deficit:  Goal: Optimize nutritional status  Outcome: Progressing

## 2023-08-17 NOTE — PLAN OF CARE
notify Licensed Independent Practitioner for values outside of normal range   Assess for signs of decreased cardiac output   Administer fluid and/or volume expanders as ordered  Goal: Absence of cardiac dysrhythmias or at baseline  8/16/2023 2121 by Ruba Ellington RN  Outcome: Progressing  8/16/2023 2027 by April Wheeler Cockayne, RN  Outcome: Progressing  Flowsheets  Taken 8/16/2023 2000 by Ruba Ellington RN  Absence of cardiac dysrhythmias or at baseline: Monitor cardiac rate and rhythm  Taken 8/16/2023 0808 by April Wheeler Cockayne, RN  Absence of cardiac dysrhythmias or at baseline:   Monitor cardiac rate and rhythm   Assess for signs of decreased cardiac output     Problem: Gastrointestinal - Adult  Goal: Minimal or absence of nausea and vomiting  8/16/2023 2121 by Ruba Ellington RN  Outcome: Progressing  8/16/2023 2027 by Radha Gutiérrez RN  Outcome: Progressing  Flowsheets  Taken 8/16/2023 2000 by Ruba Ellington RN  Minimal or absence of nausea and vomiting: Administer IV fluids as ordered to ensure adequate hydration  Taken 8/16/2023 0808 by Suyapa Sinclair RN  Minimal or absence of nausea and vomiting:   Administer IV fluids as ordered to ensure adequate hydration   Maintain NPO status until nausea and vomiting are resolved  Goal: Maintains or returns to baseline bowel function  8/16/2023 2121 by Ruba Ellington RN  Outcome: Progressing  8/16/2023 2027 by Radha Gutiérrez RN  Outcome: Progressing  Flowsheets  Taken 8/16/2023 2000 by Rbua Ellington RN  Maintains or returns to baseline bowel function: Assess bowel function  Taken 8/16/2023 2963 by Suyapa Sinclair RN  Maintains or returns to baseline bowel function:   Assess bowel function   Encourage oral fluids to ensure adequate hydration  Goal: Maintains adequate nutritional intake  8/16/2023 2121 by Ruba Ellington RN  Outcome: Progressing  8/16/2023 2027 by Radha Gutiérrez RN  Outcome: Progressing  Flowsheets  Taken 8/16/2023 2000 by Lesley Orozco RN  Maintains adequate nutritional intake: Monitor percentage of each meal consumed  Taken 8/16/2023 0808 by Suyapa Sinclair RN  Maintains adequate nutritional intake:   Monitor percentage of each meal consumed   Identify factors contributing to decreased intake, treat as appropriate  Goal: Establish and maintain optimal ostomy function  8/16/2023 2121 by Lesley Orozco RN  Outcome: Progressing  8/16/2023 2027 by Suyapa Oneal RN  Outcome: Progressing  Flowsheets  Taken 8/16/2023 2000 by Lesley Orozco RN  Establish and maintain optimal ostomy function: Monitor output from ostomies  Taken 8/16/2023 0808 by Suyapa Oneal RN  Establish and maintain optimal ostomy function:   Monitor output from ostomies   Administer IV fluids and TPN as ordered   Introduce and advance enteral feedings as ordered     Problem: Metabolic/Fluid and Electrolytes - Adult  Goal: Electrolytes maintained within normal limits  8/16/2023 2121 by Lesley Orozco RN  Outcome: Progressing  8/16/2023 2027 by Suyapa Oneal RN  Outcome: Progressing  Flowsheets  Taken 8/16/2023 2000 by Lesley Orozco RN  Electrolytes maintained within normal limits: Monitor labs and assess patient for signs and symptoms of electrolyte imbalances  Taken 8/16/2023 0808 by Suyapa Oneal RN  Electrolytes maintained within normal limits:   Monitor labs and assess patient for signs and symptoms of electrolyte imbalances   Administer electrolyte replacement as ordered   Monitor response to electrolyte replacements, including repeat lab results as appropriate  Goal: Hemodynamic stability and optimal renal function maintained  8/16/2023 2121 by Lesley Orozco RN  Outcome: Progressing  8/16/2023 2027 by Manju Larry RN  Outcome: Progressing  Flowsheets  Taken 8/16/2023 2000 by Lesley Orozco RN  Hemodynamic stability and optimal renal function maintained: Monitor labs and assess

## 2023-08-18 LAB
ALBUMIN SERPL-MCNC: 2.4 G/DL (ref 3.5–5)
ALBUMIN/GLOB SERPL: 0.9 (ref 1.1–2.2)
ALP SERPL-CCNC: 101 U/L (ref 45–117)
ALT SERPL-CCNC: 108 U/L (ref 12–78)
ANION GAP SERPL CALC-SCNC: 5 MMOL/L (ref 5–15)
AST SERPL-CCNC: 99 U/L (ref 15–37)
BILIRUB DIRECT SERPL-MCNC: 0.3 MG/DL (ref 0–0.2)
BILIRUB SERPL-MCNC: 0.7 MG/DL (ref 0.2–1)
BUN SERPL-MCNC: 25 MG/DL (ref 6–20)
BUN/CREAT SERPL: 69 (ref 12–20)
CALCIUM SERPL-MCNC: 7.5 MG/DL (ref 8.5–10.1)
CHLORIDE SERPL-SCNC: 108 MMOL/L (ref 97–108)
CO2 SERPL-SCNC: 28 MMOL/L (ref 21–32)
COMMENT:: NORMAL
CREAT SERPL-MCNC: 0.36 MG/DL (ref 0.7–1.3)
GLOBULIN SER CALC-MCNC: 2.7 G/DL (ref 2–4)
GLUCOSE BLD STRIP.AUTO-MCNC: 64 MG/DL (ref 65–117)
GLUCOSE BLD STRIP.AUTO-MCNC: 71 MG/DL (ref 65–117)
GLUCOSE BLD STRIP.AUTO-MCNC: 87 MG/DL (ref 65–117)
GLUCOSE SERPL-MCNC: 81 MG/DL (ref 65–100)
MAGNESIUM SERPL-MCNC: 2 MG/DL (ref 1.6–2.4)
PHOSPHATE SERPL-MCNC: 1.9 MG/DL (ref 2.6–4.7)
POTASSIUM SERPL-SCNC: 3.2 MMOL/L (ref 3.5–5.1)
PROT SERPL-MCNC: 5.1 G/DL (ref 6.4–8.2)
SERVICE CMNT-IMP: ABNORMAL
SERVICE CMNT-IMP: NORMAL
SERVICE CMNT-IMP: NORMAL
SODIUM SERPL-SCNC: 141 MMOL/L (ref 136–145)
SPECIMEN HOLD: NORMAL

## 2023-08-18 PROCEDURE — A4216 STERILE WATER/SALINE, 10 ML: HCPCS | Performed by: INTERNAL MEDICINE

## 2023-08-18 PROCEDURE — 84100 ASSAY OF PHOSPHORUS: CPT

## 2023-08-18 PROCEDURE — 2580000003 HC RX 258: Performed by: INTERNAL MEDICINE

## 2023-08-18 PROCEDURE — 83735 ASSAY OF MAGNESIUM: CPT

## 2023-08-18 PROCEDURE — 82962 GLUCOSE BLOOD TEST: CPT

## 2023-08-18 PROCEDURE — C9113 INJ PANTOPRAZOLE SODIUM, VIA: HCPCS | Performed by: INTERNAL MEDICINE

## 2023-08-18 PROCEDURE — 2580000003 HC RX 258: Performed by: SURGERY

## 2023-08-18 PROCEDURE — 6360000002 HC RX W HCPCS: Performed by: SURGERY

## 2023-08-18 PROCEDURE — 36415 COLL VENOUS BLD VENIPUNCTURE: CPT

## 2023-08-18 PROCEDURE — 2500000003 HC RX 250 WO HCPCS: Performed by: SURGERY

## 2023-08-18 PROCEDURE — 80048 BASIC METABOLIC PNL TOTAL CA: CPT

## 2023-08-18 PROCEDURE — 80076 HEPATIC FUNCTION PANEL: CPT

## 2023-08-18 PROCEDURE — 6360000002 HC RX W HCPCS: Performed by: INTERNAL MEDICINE

## 2023-08-18 PROCEDURE — 99231 SBSQ HOSP IP/OBS SF/LOW 25: CPT | Performed by: SURGERY

## 2023-08-18 PROCEDURE — 2060000000 HC ICU INTERMEDIATE R&B

## 2023-08-18 RX ADMIN — SODIUM CHLORIDE, PRESERVATIVE FREE 10 ML: 5 INJECTION INTRAVENOUS at 08:26

## 2023-08-18 RX ADMIN — PANTOPRAZOLE SODIUM 40 MG: 40 INJECTION, POWDER, LYOPHILIZED, FOR SOLUTION INTRAVENOUS at 00:48

## 2023-08-18 RX ADMIN — HYDROMORPHONE HYDROCHLORIDE 1 MG: 1 INJECTION, SOLUTION INTRAMUSCULAR; INTRAVENOUS; SUBCUTANEOUS at 18:50

## 2023-08-18 RX ADMIN — POTASSIUM CHLORIDE: 2 INJECTION, SOLUTION, CONCENTRATE INTRAVENOUS at 18:50

## 2023-08-18 RX ADMIN — HYDROMORPHONE HYDROCHLORIDE 1 MG: 1 INJECTION, SOLUTION INTRAMUSCULAR; INTRAVENOUS; SUBCUTANEOUS at 11:20

## 2023-08-18 RX ADMIN — I.V. FAT EMULSION 250 ML: 20 EMULSION INTRAVENOUS at 21:41

## 2023-08-18 RX ADMIN — HYDROMORPHONE HYDROCHLORIDE 1 MG: 1 INJECTION, SOLUTION INTRAMUSCULAR; INTRAVENOUS; SUBCUTANEOUS at 15:47

## 2023-08-18 RX ADMIN — HYDROMORPHONE HYDROCHLORIDE 1 MG: 1 INJECTION, SOLUTION INTRAMUSCULAR; INTRAVENOUS; SUBCUTANEOUS at 08:25

## 2023-08-18 ASSESSMENT — PAIN DESCRIPTION - DESCRIPTORS: DESCRIPTORS: ACHING

## 2023-08-18 ASSESSMENT — PAIN DESCRIPTION - ORIENTATION: ORIENTATION: RIGHT

## 2023-08-18 ASSESSMENT — PAIN DESCRIPTION - LOCATION: LOCATION: NECK;BACK

## 2023-08-18 ASSESSMENT — PAIN SCALES - GENERAL: PAINLEVEL_OUTOF10: 5

## 2023-08-18 NOTE — PROGRESS NOTES
Palliative Medicine   [place] [phone] - [phone] (COPE)      Palliative Medicine SW attempted to meet with the patient at bedside for ongoing support, however, he was sleeping with the blankets over his head- did not awake. Our team will follow-up early next week.      Thank you for including Palliative Medicine in the care of  [place], [place]

## 2023-08-18 NOTE — PLAN OF CARE
Problem: Discharge Planning  Goal: Discharge to home or other facility with appropriate resources  8/17/2023 2122 by Irma Cruz RN  Outcome: Progressing  Flowsheets (Taken 8/17/2023 2000)  Discharge to home or other facility with appropriate resources: Identify barriers to discharge with patient and caregiver  8/17/2023 1018 by Dorothy Cummins RN  Outcome: Progressing     Problem: Skin/Tissue Integrity  Goal: Absence of new skin breakdown  Description: 1. Monitor for areas of redness and/or skin breakdown  2. Assess vascular access sites hourly  3. Every 4-6 hours minimum:  Change oxygen saturation probe site  4. Every 4-6 hours:  If on nasal continuous positive airway pressure, respiratory therapy assess nares and determine need for appliance change or resting period.   8/17/2023 2122 by Irma Cruz RN  Outcome: Progressing  8/17/2023 1018 by Dorothy Cummins RN  Outcome: Progressing     Problem: Safety - Adult  Goal: Free from fall injury  8/17/2023 2122 by Irma Cruz RN  Outcome: Progressing  8/17/2023 1018 by Dorothy Cummins RN  Outcome: Progressing     Problem: Cardiovascular - Adult  Goal: Maintains optimal cardiac output and hemodynamic stability  8/17/2023 2122 by Irma Cruz RN  Outcome: Progressing  Flowsheets (Taken 8/17/2023 2000)  Maintains optimal cardiac output and hemodynamic stability: Monitor blood pressure and heart rate  8/17/2023 1018 by Dorothy Cummins RN  Outcome: Progressing  Goal: Absence of cardiac dysrhythmias or at baseline  8/17/2023 2122 by Irma Cruz RN  Outcome: Progressing  Flowsheets (Taken 8/17/2023 2000)  Absence of cardiac dysrhythmias or at baseline: Monitor cardiac rate and rhythm  8/17/2023 1018 by Dorothy Cummins RN  Outcome: Progressing     Problem: Gastrointestinal - Adult  Goal: Minimal or absence of nausea and vomiting  8/17/2023 2122 by Irma Cruz RN  Outcome: Progressing  Flowsheets (Taken 8/17/2023 2000)  Minimal or absence of nausea and vomiting: Administer IV fluids as ordered to ensure adequate hydration  8/17/2023 1018 by Arnoldo Peng RN  Outcome: Progressing  Goal: Maintains or returns to baseline bowel function  8/17/2023 2122 by Priya Goldberg RN  Outcome: Progressing  Flowsheets (Taken 8/17/2023 2000)  Maintains or returns to baseline bowel function: Assess bowel function  8/17/2023 1018 by Arnoldo Peng RN  Outcome: Progressing  Goal: Maintains adequate nutritional intake  8/17/2023 2122 by Priya Goldberg RN  Outcome: Progressing  Flowsheets (Taken 8/17/2023 2000)  Maintains adequate nutritional intake: Monitor percentage of each meal consumed  8/17/2023 1018 by Arnoldo Peng RN  Outcome: Progressing  Goal: Establish and maintain optimal ostomy function  8/17/2023 2122 by Priya Goldberg RN  Outcome: Progressing  Flowsheets (Taken 8/17/2023 2000)  Establish and maintain optimal ostomy function: Monitor output from ostomies  8/17/2023 1018 by Arnoldo Peng RN  Outcome: Progressing     Problem: Metabolic/Fluid and Electrolytes - Adult  Goal: Electrolytes maintained within normal limits  8/17/2023 2122 by Priya Goldberg RN  Outcome: Progressing  Flowsheets (Taken 8/17/2023 2000)  Electrolytes maintained within normal limits: Monitor labs and assess patient for signs and symptoms of electrolyte imbalances  8/17/2023 1018 by Arnoldo Peng RN  Outcome: Progressing  Goal: Hemodynamic stability and optimal renal function maintained  8/17/2023 2122 by Priya Goldberg RN  Outcome: Progressing  Flowsheets (Taken 8/17/2023 2000)  Hemodynamic stability and optimal renal function maintained: Monitor labs and assess for signs and symptoms of volume excess or deficit  8/17/2023 1018 by Arnoldo Peng RN  Outcome: Progressing  Goal: Glucose maintained within prescribed range  8/17/2023 2122 by Priya Goldberg RN  Outcome: Progressing  Flowsheets

## 2023-08-18 NOTE — PROGRESS NOTES
Hospitalist Progress Note  Apple Victoria MD  Answering service: [phone]        Date of Service:  2023  NAME:  Leonila Chiang  :  1955  MRN:  [id number]      Admission Summary:     Patient transferred from [facility] with gastric outlet obstruction and gastric emphysema. Interval history / Subjective:     Patient has no acute complaint. Assessment & Plan:     Gastric emphysema  -Patient presented with abdominal pain, distention, inability to tolerate p.o. and significant weight loss  -Initial CT abdomen and pelvis shows massive gastric distention with findings suspicious for gastric wall emphysema  -Follow-up upper GI series with mildly dilated stomach, no barium seen passing through the pylorus  -S/p EGD shows friable mass in the duodenum, s/p biopsy, pathology pending  -GI and general surgery following, also palliative care following, waiting for pathology before further discussion about goal of care  -Continuing TPN    Hypertension  -Holding p.o. meds as patient cannot tolerate p.o. intake  -Monitor blood pressure    Hypernatremia  -Resolved    History of lung cancer  -Patient follows with oncology at [facility]    Anemia  -This is likely anemia of chronic disease given patient's history of malignancy  -Monitor H&H    BPH  -Holding p.o. meds as patient cannot tolerate p.o. intake    Severe protein calorie malnutrition  -Continue TPN, nutrition following     Code status: Full  Prophylaxis: SCDs  Care Plan discussed with: Patient  Anticipated Disposition: Likely more than 48 hours             Review of Systems:   Pertinent items are noted in HPI. Vital Signs:    Last 24hrs VS reviewed since prior progress note.  Most recent are:  Vitals:    23 1200   BP:    Pulse: 69   Resp:    Temp:    SpO2:          Intake/Output Summary (Last 24 hours) at 2023 1259  Last data

## 2023-08-18 NOTE — PROGRESS NOTES
2000: Report received on patient from [place], [place]. Patient resting in bed with covers over head and in no apparent distress. 2200: Patient reporting he needs to have a bowel movement. Taken to bathroom with walker and standby assist. BM is watery and medium in size. Per patient, this is his first bowel movement since admission. 0000: Labs drawn and sent. Waiting for results. 0214: NP notified of abnormal lab values including potassium, calcium and phosphorus. No orders received at this time. 0400: Patient resting in bed and in no apparent distress at this time. Heart rate noted to be in 40s-50s with softer BP 95/61.    0600: Patient resting. 0800: Bedside shift change report given to AYESHA Dale (oncoming nurse) by Pau Grossman RN (offgoing nurse). Report included the following information Nurse Handoff Report.

## 2023-08-18 NOTE — PROGRESS NOTES
NUTRITION brief    Recommendations:     Replete K+ and Phos outside of TPN. Recommend repeating levels this afternoon as well and giving further repletion if needed. Recommend increasing Phos in TPN as well     Updated TPN advancement recs as below:         Day 4 TPN (8/18) advance to 200 gm dex + start 250 mL 20% lipids daily   Continue 200 gm dex, 85 gm AA and lipids through weekend (unless lytes are more stable in which case would increase by 50 gm dex daily to goal of 300 gm dex)     TPN goal: 100 gm AA, 300 gm dex @ 75 mL/hr + 250 mL 20% lipids daily (RD will reassess Monday 8/21 appropriateness of advancing)       Diet: NPO  Nutrition Support: TPN with 150 gm dex, 85 gm AA @ 75 mL/hr  Nutrition-related meds: protonix    New events impacting nutrition plan of care:   Pt with episodes of hypoglycemia yesterday, spoke with RN at that time. She had amp of D10 running per protocol. Discussed possible need of resuming D5, but worried about feeding too quickly. BG could have dropped from stopping D5 in the AM and pt with already low glycogen stores. Doesn't appear D5 was ever resumed. BG hasn't been checked since last night, but was WNL. Phos remains low (unchanged despite repletion) and K+ also dropped. See full RD assessment from 8/17 for additional details, goals, and monitoring/evaluation.    Estimated Nutrition Needs:   Energy Requirements Based On: Kcal/kg  Weight Used for Energy Requirements: Admission (50 kg)  Energy (kcal/day): 2000 (40+ kcal/kg)  Weight Used for Protein Requirements: Admission  Protein (g/day): 100 (2 gm/kg)     Fluid (ml/day): 1 mL/kcal    Recent Labs     08/16/23  0110 08/17/23  0232 08/18/23  0048   GLUCOSE 82 91 81   BUN 34* 27* 25*   CREATININE 0.65* 0.51* 0.36*   * 143 141   K 3.2* 3.5 3.2*   * 110* 108   CO2 30 29 28   CALCIUM 7.9* 7.9* 7.5*   PHOS 3.2 1.9* 1.9*   MG 2.0 2.1 2.0         Recent Labs     08/16/23  1137 08/17/23  0007 08/17/23  0555

## 2023-08-18 NOTE — PROGRESS NOTES
Mr. Claudia Toure has no c/o today. Tm 98.1 Tc 97.5 HR: 56 BP: 108/86 Resp Rate: 17 97% sat on room air. Intake/Output Summary (Last 24 hours) at 8/18/2023 1005  Last data filed at 8/18/2023 0112  Gross per 24 hour   Intake --   Output 700 ml   Net -700 ml   Exam: Cor: RRR. Lungs: Bilateral breath sounds. Abd: Soft. Non distended. Non tender. No guarding or rebound. Labs:   Recent Results (from the past 12 hour(s))   Basic Metabolic Panel    Collection Time: 08/18/23 12:48 AM   Result Value Ref Range    Sodium 141 136 - 145 mmol/L    Potassium 3.2 (L) 3.5 - 5.1 mmol/L    Chloride 108 97 - 108 mmol/L    CO2 28 21 - 32 mmol/L    Anion Gap 5 5 - 15 mmol/L    Glucose 81 65 - 100 mg/dL    BUN 25 (H) 6 - 20 MG/DL    Creatinine 0.36 (L) 0.70 - 1.30 MG/DL    Bun/Cre Ratio 69 (H) 12 - 20      Est, Glom Filt Rate >60 >60 ml/min/1.73m2    Calcium 7.5 (L) 8.5 - 10.1 MG/DL   Magnesium    Collection Time: 08/18/23 12:48 AM   Result Value Ref Range    Magnesium 2.0 1.6 - 2.4 mg/dL   Phosphorus    Collection Time: 08/18/23 12:48 AM   Result Value Ref Range    Phosphorus 1.9 (L) 2.6 - 4.7 MG/DL   Hepatic Function Panel    Collection Time: 08/18/23 12:48 AM   Result Value Ref Range    Total Protein 5.1 (L) 6.4 - 8.2 g/dL    Albumin 2.4 (L) 3.5 - 5.0 g/dL    Globulin 2.7 2.0 - 4.0 g/dL    Albumin/Globulin Ratio 0.9 (L) 1.1 - 2.2      Total Bilirubin 0.7 0.2 - 1.0 MG/DL    Bilirubin, Direct 0.3 (H) 0.0 - 0.2 MG/DL    Alk Phosphatase 101 45 - 117 U/L    AST 99 (H) 15 - 37 U/L     (H) 12 - 78 U/L   Extra Tubes Hold    Collection Time: 08/18/23 12:48 AM   Result Value Ref Range    Specimen HOld 1lav     Comment:        Add-on orders for these samples will be processed based on acceptable specimen integrity and analyte stability, which may vary by analyte. Mr. Claudia Toure is a 78 yo man with a gastric outlet obstruction who is doing well. Awaiting pathology from EGD.   Continue

## 2023-08-19 LAB
ANION GAP SERPL CALC-SCNC: 5 MMOL/L (ref 5–15)
BASOPHILS # BLD: 0 K/UL (ref 0–0.1)
BASOPHILS NFR BLD: 0 % (ref 0–1)
BUN SERPL-MCNC: 24 MG/DL (ref 6–20)
BUN/CREAT SERPL: 62 (ref 12–20)
CALCIUM SERPL-MCNC: 7.7 MG/DL (ref 8.5–10.1)
CHLORIDE SERPL-SCNC: 107 MMOL/L (ref 97–108)
CO2 SERPL-SCNC: 28 MMOL/L (ref 21–32)
COMMENT:: NORMAL
CREAT SERPL-MCNC: 0.39 MG/DL (ref 0.7–1.3)
DIFFERENTIAL METHOD BLD: ABNORMAL
EOSINOPHIL # BLD: 0 K/UL (ref 0–0.4)
EOSINOPHIL NFR BLD: 0 % (ref 0–7)
ERYTHROCYTE [DISTWIDTH] IN BLOOD BY AUTOMATED COUNT: 13.2 % (ref 11.5–14.5)
GLUCOSE BLD STRIP.AUTO-MCNC: 105 MG/DL (ref 65–117)
GLUCOSE BLD STRIP.AUTO-MCNC: 108 MG/DL (ref 65–117)
GLUCOSE BLD STRIP.AUTO-MCNC: 116 MG/DL (ref 65–117)
GLUCOSE BLD STRIP.AUTO-MCNC: 87 MG/DL (ref 65–117)
GLUCOSE SERPL-MCNC: 108 MG/DL (ref 65–100)
HCT VFR BLD AUTO: 28.6 % (ref 36.6–50.3)
HGB BLD-MCNC: 10 G/DL (ref 12.1–17)
IMM GRANULOCYTES # BLD AUTO: 0 K/UL
IMM GRANULOCYTES NFR BLD AUTO: 0 %
LYMPHOCYTES # BLD: 0.6 K/UL (ref 0.8–3.5)
LYMPHOCYTES NFR BLD: 15 % (ref 12–49)
MAGNESIUM SERPL-MCNC: 2.1 MG/DL (ref 1.6–2.4)
MCH RBC QN AUTO: 36.5 PG (ref 26–34)
MCHC RBC AUTO-ENTMCNC: 35 G/DL (ref 30–36.5)
MCV RBC AUTO: 104.4 FL (ref 80–99)
METAMYELOCYTES NFR BLD MANUAL: 1 %
MONOCYTES # BLD: 0.1 K/UL (ref 0–1)
MONOCYTES NFR BLD: 4 % (ref 5–13)
NEUTS SEG # BLD: 3 K/UL (ref 1.8–8)
NEUTS SEG NFR BLD: 80 % (ref 32–75)
NRBC # BLD: 0 K/UL (ref 0–0.01)
NRBC BLD-RTO: 0 PER 100 WBC
PHOSPHATE SERPL-MCNC: 1.5 MG/DL (ref 2.6–4.7)
PLATELET # BLD AUTO: 95 K/UL (ref 150–400)
PLATELET COMMENT: ABNORMAL
PMV BLD AUTO: 12 FL (ref 8.9–12.9)
POTASSIUM SERPL-SCNC: 3.2 MMOL/L (ref 3.5–5.1)
RBC # BLD AUTO: 2.74 M/UL (ref 4.1–5.7)
RBC MORPH BLD: ABNORMAL
RBC MORPH BLD: ABNORMAL
SERVICE CMNT-IMP: NORMAL
SODIUM SERPL-SCNC: 140 MMOL/L (ref 136–145)
SPECIMEN HOLD: NORMAL
WBC # BLD AUTO: 3.7 K/UL (ref 4.1–11.1)

## 2023-08-19 PROCEDURE — 6360000002 HC RX W HCPCS: Performed by: SURGERY

## 2023-08-19 PROCEDURE — 84100 ASSAY OF PHOSPHORUS: CPT

## 2023-08-19 PROCEDURE — 2580000003 HC RX 258: Performed by: INTERNAL MEDICINE

## 2023-08-19 PROCEDURE — 6370000000 HC RX 637 (ALT 250 FOR IP): Performed by: SURGERY

## 2023-08-19 PROCEDURE — 85025 COMPLETE CBC W/AUTO DIFF WBC: CPT

## 2023-08-19 PROCEDURE — 36415 COLL VENOUS BLD VENIPUNCTURE: CPT

## 2023-08-19 PROCEDURE — 93005 ELECTROCARDIOGRAM TRACING: CPT | Performed by: FAMILY MEDICINE

## 2023-08-19 PROCEDURE — 82962 GLUCOSE BLOOD TEST: CPT

## 2023-08-19 PROCEDURE — 83735 ASSAY OF MAGNESIUM: CPT

## 2023-08-19 PROCEDURE — 6360000002 HC RX W HCPCS: Performed by: FAMILY MEDICINE

## 2023-08-19 PROCEDURE — 2580000003 HC RX 258: Performed by: FAMILY MEDICINE

## 2023-08-19 PROCEDURE — 6370000000 HC RX 637 (ALT 250 FOR IP): Performed by: FAMILY MEDICINE

## 2023-08-19 PROCEDURE — 80048 BASIC METABOLIC PNL TOTAL CA: CPT

## 2023-08-19 PROCEDURE — P9045 ALBUMIN (HUMAN), 5%, 250 ML: HCPCS | Performed by: FAMILY MEDICINE

## 2023-08-19 PROCEDURE — 2500000003 HC RX 250 WO HCPCS: Performed by: SURGERY

## 2023-08-19 PROCEDURE — 2060000000 HC ICU INTERMEDIATE R&B

## 2023-08-19 PROCEDURE — 2580000003 HC RX 258: Performed by: SURGERY

## 2023-08-19 RX ORDER — ALBUMIN, HUMAN INJ 5% 5 %
25 SOLUTION INTRAVENOUS ONCE
Status: COMPLETED | OUTPATIENT
Start: 2023-08-19 | End: 2023-08-19

## 2023-08-19 RX ORDER — MIDODRINE HYDROCHLORIDE 5 MG/1
10 TABLET ORAL
Status: DISCONTINUED | OUTPATIENT
Start: 2023-08-19 | End: 2023-08-23

## 2023-08-19 RX ORDER — POTASSIUM CHLORIDE 750 MG/1
40 TABLET, FILM COATED, EXTENDED RELEASE ORAL ONCE
Status: COMPLETED | OUTPATIENT
Start: 2023-08-19 | End: 2023-08-19

## 2023-08-19 RX ORDER — 0.9 % SODIUM CHLORIDE 0.9 %
500 INTRAVENOUS SOLUTION INTRAVENOUS ONCE
Status: COMPLETED | OUTPATIENT
Start: 2023-08-19 | End: 2023-08-19

## 2023-08-19 RX ADMIN — HYDROMORPHONE HYDROCHLORIDE 1 MG: 1 INJECTION, SOLUTION INTRAMUSCULAR; INTRAVENOUS; SUBCUTANEOUS at 20:06

## 2023-08-19 RX ADMIN — SODIUM CHLORIDE, PRESERVATIVE FREE 10 ML: 5 INJECTION INTRAVENOUS at 20:06

## 2023-08-19 RX ADMIN — SODIUM CHLORIDE, PRESERVATIVE FREE 10 ML: 5 INJECTION INTRAVENOUS at 09:03

## 2023-08-19 RX ADMIN — ONDANSETRON 4 MG: 4 TABLET, ORALLY DISINTEGRATING ORAL at 20:06

## 2023-08-19 RX ADMIN — POTASSIUM CHLORIDE 40 MEQ: 750 TABLET, FILM COATED, EXTENDED RELEASE ORAL at 16:58

## 2023-08-19 RX ADMIN — I.V. FAT EMULSION 250 ML: 20 EMULSION INTRAVENOUS at 18:44

## 2023-08-19 RX ADMIN — SODIUM CHLORIDE 500 ML: 9 INJECTION, SOLUTION INTRAVENOUS at 16:59

## 2023-08-19 RX ADMIN — HYDROMORPHONE HYDROCHLORIDE 1 MG: 1 INJECTION, SOLUTION INTRAMUSCULAR; INTRAVENOUS; SUBCUTANEOUS at 23:36

## 2023-08-19 RX ADMIN — MIDODRINE HYDROCHLORIDE 10 MG: 5 TABLET ORAL at 16:58

## 2023-08-19 RX ADMIN — POTASSIUM CHLORIDE: 2 INJECTION, SOLUTION, CONCENTRATE INTRAVENOUS at 18:39

## 2023-08-19 RX ADMIN — ALBUMIN (HUMAN) 25 G: 12.5 INJECTION, SOLUTION INTRAVENOUS at 16:58

## 2023-08-19 RX ADMIN — SODIUM CHLORIDE, PRESERVATIVE FREE 10 ML: 5 INJECTION INTRAVENOUS at 09:02

## 2023-08-19 ASSESSMENT — PAIN DESCRIPTION - ORIENTATION
ORIENTATION: MID;UPPER
ORIENTATION: MID;UPPER

## 2023-08-19 ASSESSMENT — PAIN DESCRIPTION - DESCRIPTORS
DESCRIPTORS: CRAMPING
DESCRIPTORS: CRAMPING

## 2023-08-19 ASSESSMENT — PAIN SCALES - GENERAL
PAINLEVEL_OUTOF10: 5
PAINLEVEL_OUTOF10: 5

## 2023-08-19 ASSESSMENT — PAIN DESCRIPTION - LOCATION
LOCATION: ABDOMEN
LOCATION: ABDOMEN

## 2023-08-19 NOTE — PROGRESS NOTES
Critical Care Documentation    Name: Marisol Wise  YOB: 1955  MRN: [id number]  Admission Date: 8/10/2023 11:13 PM    Date of service: 8/19/2023    Active Diagnoses:        Chief Complaint:  Low BP    Clinical Presentation:  Patient was noted low BP this afternoon with SBP in 70s, relatively asymptomatic. No chest pain or dizziness. Also noted sinus pauses on monitor. Physical Exam:   General : alert x 3, awake, no acute distress,   HEENT: PEERL, EOMI, moist mucus membrane, TM clear  Neck: supple, no JVD, no meningeal signs  Chest: Clear to auscultation bilaterally   CVS: S1 S2 heard, Capillary refill less than 2 seconds  Abd: soft/ non tender, non distended, BS physiological,   Ext: no clubbing, no cyanosis, no edema, brisk 2+ DP pulses  Neuro/Psych: pleasant mood and affect, CN 2-12 grossly intact, sensory grossly within normal limit, Strength 5/5 in all extremities, DTR 1+ x 4  Skin: warm     Data Reviewed: All diagnostic labs and studies have been reviewed. Assessment and Plan:  Hypotension  -Not related to sinus pauses  -Will give NS bolus 500 ml, midodrine 10 mg tid and albumin  -Check EKG, Cardiology consult for evaluation of sinus pauses, will replete electrolytes    Medications Administered:   Sedation: [ ] yes [ ] no   Anxiolytics: [ ] yes [ ] no   Antiarrhythmics: [ ] yes [ ] no   Antihypertensives: [ ] yes [ ] no   Pressors: [ ] yes [ ] no   IVF's: [ ] yes [ ] no       Critical Care Attestation: This patient is unstable and critically ill. Due to a high probability of clinically significant, life threatening deterioration, the patient required my highest level of preparedness to intervene emergently and I personally spent this critical care time directly and personally managing the patient.  This critical care time included obtaining a history; examining the patient; pulse oximetry; ordering and review of studies; arranging urgent treatment with development of a management

## 2023-08-19 NOTE — PROGRESS NOTES
Bedside shift change report given to Blanca Jaimes RN (oncoming nurse) by AYESHA Dale (offgoing nurse). Report included the following information Nurse Handoff Report, Intake/Output, MAR, Recent Results, and Cardiac Rhythm Sinus braulio-NSR .

## 2023-08-19 NOTE — PROGRESS NOTES
Hospitalist Progress Note  Renetta Peacock MD  Answering service: [phone]        Date of Service:  2023  NAME:  Shayla Jade  :  1955  MRN:  [id number]      Admission Summary:     Patient transferred from [facility] with gastric outlet obstruction and gastric emphysema. Interval history / Subjective:     Patient has no acute complaint. Assessment & Plan:     Gastric emphysema  -Patient presented with abdominal pain, distention, inability to tolerate p.o. and significant weight loss  -Initial CT abdomen and pelvis shows massive gastric distention with findings suspicious for gastric wall emphysema  -Follow-up upper GI series with mildly dilated stomach, no barium seen passing through the pylorus  -S/p EGD shows friable mass in the duodenum, s/p biopsy, pathology negative for malignancy  -GI and general surgery following, also palliative care following  -Continuing TPN, further plan per general surgery    Hypertension  -Holding p.o. meds as patient cannot tolerate p.o. intake  -Monitor blood pressure    Hypernatremia  -Resolved    History of lung cancer  -Patient follows with oncology at [facility]    Anemia  -This is likely anemia of chronic disease given patient's history of malignancy  -Monitor H&H    BPH  -Holding p.o. meds as patient cannot tolerate p.o. intake    Severe protein calorie malnutrition  -Continue TPN, nutrition following     Code status: Full  Prophylaxis: SCDs  Care Plan discussed with: Patient  Anticipated Disposition: Likely more than 48 hours             Review of Systems:   Pertinent items are noted in HPI. Vital Signs:    Last 24hrs VS reviewed since prior progress note.  Most recent are:  Vitals:    23 1200   BP:    Pulse: 62   Resp:    Temp:    SpO2:          Intake/Output Summary (Last 24 hours) at 2023 1420  Last data filed at 2023

## 2023-08-19 NOTE — PROGRESS NOTES
Called about pt with bradycardia and having 3 second pauses. In setting of abdominal pain/gastric emphysema per admit and hospital course. Reviewed EKG and rhythm strips and this is sinus bradycardia. Likely pauses secondary to increased vagal tone. Not on any medications that would effect heart rate. No indication for pacemaker and does not reflect an intrinsic electrical issue. For low BP, consider sepsis. Full consult to follow. Discussed with nurse following pt.

## 2023-08-20 PROBLEM — R00.1 BRADYCARDIA: Status: ACTIVE | Noted: 2023-08-20

## 2023-08-20 LAB
ANION GAP SERPL CALC-SCNC: 1 MMOL/L (ref 5–15)
BASOPHILS # BLD: 0 K/UL (ref 0–0.1)
BASOPHILS NFR BLD: 0 % (ref 0–1)
BUN SERPL-MCNC: 22 MG/DL (ref 6–20)
BUN/CREAT SERPL: 48 (ref 12–20)
CALCIUM SERPL-MCNC: 8.2 MG/DL (ref 8.5–10.1)
CHLORIDE SERPL-SCNC: 109 MMOL/L (ref 97–108)
CO2 SERPL-SCNC: 32 MMOL/L (ref 21–32)
CREAT SERPL-MCNC: 0.46 MG/DL (ref 0.7–1.3)
DIFFERENTIAL METHOD BLD: ABNORMAL
EKG ATRIAL RATE: 66 BPM
EKG DIAGNOSIS: NORMAL
EKG P AXIS: 90 DEGREES
EKG P-R INTERVAL: 188 MS
EKG Q-T INTERVAL: 450 MS
EKG QRS DURATION: 140 MS
EKG QTC CALCULATION (BAZETT): 471 MS
EKG R AXIS: -77 DEGREES
EKG T AXIS: 251 DEGREES
EKG VENTRICULAR RATE: 66 BPM
EOSINOPHIL # BLD: 0 K/UL (ref 0–0.4)
EOSINOPHIL NFR BLD: 0 % (ref 0–7)
ERYTHROCYTE [DISTWIDTH] IN BLOOD BY AUTOMATED COUNT: 13.3 % (ref 11.5–14.5)
GLUCOSE BLD STRIP.AUTO-MCNC: 100 MG/DL (ref 65–117)
GLUCOSE BLD STRIP.AUTO-MCNC: 110 MG/DL (ref 65–117)
GLUCOSE SERPL-MCNC: 100 MG/DL (ref 65–100)
HCT VFR BLD AUTO: 23.9 % (ref 36.6–50.3)
HGB BLD-MCNC: 8.3 G/DL (ref 12.1–17)
IMM GRANULOCYTES # BLD AUTO: 0 K/UL (ref 0–0.04)
IMM GRANULOCYTES NFR BLD AUTO: 0 % (ref 0–0.5)
LYMPHOCYTES # BLD: 0.8 K/UL (ref 0.8–3.5)
LYMPHOCYTES NFR BLD: 35 % (ref 12–49)
MAGNESIUM SERPL-MCNC: 2 MG/DL (ref 1.6–2.4)
MCH RBC QN AUTO: 36.2 PG (ref 26–34)
MCHC RBC AUTO-ENTMCNC: 34.7 G/DL (ref 30–36.5)
MCV RBC AUTO: 104.4 FL (ref 80–99)
MONOCYTES # BLD: 0.2 K/UL (ref 0–1)
MONOCYTES NFR BLD: 8 % (ref 5–13)
NEUTS SEG # BLD: 1.4 K/UL (ref 1.8–8)
NEUTS SEG NFR BLD: 57 % (ref 32–75)
NRBC # BLD: 0 K/UL (ref 0–0.01)
NRBC BLD-RTO: 0 PER 100 WBC
PHOSPHATE SERPL-MCNC: 1.2 MG/DL (ref 2.6–4.7)
PLATELET # BLD AUTO: 69 K/UL (ref 150–400)
PMV BLD AUTO: 11.4 FL (ref 8.9–12.9)
POTASSIUM SERPL-SCNC: 2.4 MMOL/L (ref 3.5–5.1)
RBC # BLD AUTO: 2.29 M/UL (ref 4.1–5.7)
RBC MORPH BLD: ABNORMAL
RBC MORPH BLD: ABNORMAL
SERVICE CMNT-IMP: NORMAL
SERVICE CMNT-IMP: NORMAL
SODIUM SERPL-SCNC: 142 MMOL/L (ref 136–145)
WBC # BLD AUTO: 2.4 K/UL (ref 4.1–11.1)

## 2023-08-20 PROCEDURE — 2500000003 HC RX 250 WO HCPCS: Performed by: SURGERY

## 2023-08-20 PROCEDURE — 36415 COLL VENOUS BLD VENIPUNCTURE: CPT

## 2023-08-20 PROCEDURE — 85025 COMPLETE CBC W/AUTO DIFF WBC: CPT

## 2023-08-20 PROCEDURE — 2580000003 HC RX 258: Performed by: SURGERY

## 2023-08-20 PROCEDURE — 2580000003 HC RX 258: Performed by: INTERNAL MEDICINE

## 2023-08-20 PROCEDURE — 80048 BASIC METABOLIC PNL TOTAL CA: CPT

## 2023-08-20 PROCEDURE — 2060000000 HC ICU INTERMEDIATE R&B

## 2023-08-20 PROCEDURE — 83735 ASSAY OF MAGNESIUM: CPT

## 2023-08-20 PROCEDURE — 84100 ASSAY OF PHOSPHORUS: CPT

## 2023-08-20 PROCEDURE — 6370000000 HC RX 637 (ALT 250 FOR IP): Performed by: FAMILY MEDICINE

## 2023-08-20 PROCEDURE — 6360000002 HC RX W HCPCS

## 2023-08-20 PROCEDURE — 6360000002 HC RX W HCPCS: Performed by: SURGERY

## 2023-08-20 PROCEDURE — 2500000003 HC RX 250 WO HCPCS: Performed by: FAMILY MEDICINE

## 2023-08-20 PROCEDURE — 2580000003 HC RX 258: Performed by: FAMILY MEDICINE

## 2023-08-20 PROCEDURE — 82962 GLUCOSE BLOOD TEST: CPT

## 2023-08-20 RX ORDER — POTASSIUM CHLORIDE 29.8 MG/ML
20 INJECTION INTRAVENOUS
Status: COMPLETED | OUTPATIENT
Start: 2023-08-20 | End: 2023-08-20

## 2023-08-20 RX ORDER — 0.9 % SODIUM CHLORIDE 0.9 %
500 INTRAVENOUS SOLUTION INTRAVENOUS ONCE
Status: COMPLETED | OUTPATIENT
Start: 2023-08-20 | End: 2023-08-20

## 2023-08-20 RX ADMIN — MIDODRINE HYDROCHLORIDE 10 MG: 5 TABLET ORAL at 19:10

## 2023-08-20 RX ADMIN — POTASSIUM CHLORIDE 20 MEQ: 29.8 INJECTION, SOLUTION INTRAVENOUS at 06:17

## 2023-08-20 RX ADMIN — SODIUM CHLORIDE, PRESERVATIVE FREE 10 ML: 5 INJECTION INTRAVENOUS at 20:51

## 2023-08-20 RX ADMIN — HYDROMORPHONE HYDROCHLORIDE 1 MG: 1 INJECTION, SOLUTION INTRAMUSCULAR; INTRAVENOUS; SUBCUTANEOUS at 06:16

## 2023-08-20 RX ADMIN — SODIUM CHLORIDE, PRESERVATIVE FREE 10 ML: 5 INJECTION INTRAVENOUS at 08:04

## 2023-08-20 RX ADMIN — MIDODRINE HYDROCHLORIDE 10 MG: 5 TABLET ORAL at 09:01

## 2023-08-20 RX ADMIN — POTASSIUM CHLORIDE 20 MEQ: 29.8 INJECTION, SOLUTION INTRAVENOUS at 08:02

## 2023-08-20 RX ADMIN — POTASSIUM CHLORIDE: 2 INJECTION, SOLUTION, CONCENTRATE INTRAVENOUS at 19:10

## 2023-08-20 RX ADMIN — SODIUM CHLORIDE, PRESERVATIVE FREE 10 ML: 5 INJECTION INTRAVENOUS at 08:03

## 2023-08-20 RX ADMIN — I.V. FAT EMULSION 250 ML: 20 EMULSION INTRAVENOUS at 19:10

## 2023-08-20 RX ADMIN — POTASSIUM PHOSPHATE, MONOBASIC AND POTASSIUM PHOSPHATE, DIBASIC 10 MMOL: 224; 236 INJECTION, SOLUTION, CONCENTRATE INTRAVENOUS at 15:01

## 2023-08-20 RX ADMIN — SODIUM CHLORIDE 500 ML: 9 INJECTION, SOLUTION INTRAVENOUS at 15:02

## 2023-08-20 RX ADMIN — HYDROMORPHONE HYDROCHLORIDE 1 MG: 1 INJECTION, SOLUTION INTRAMUSCULAR; INTRAVENOUS; SUBCUTANEOUS at 03:01

## 2023-08-20 RX ADMIN — POTASSIUM CHLORIDE 20 MEQ: 29.8 INJECTION, SOLUTION INTRAVENOUS at 05:10

## 2023-08-20 RX ADMIN — MIDODRINE HYDROCHLORIDE 10 MG: 5 TABLET ORAL at 12:37

## 2023-08-20 RX ADMIN — HYDROMORPHONE HYDROCHLORIDE 1 MG: 1 INJECTION, SOLUTION INTRAMUSCULAR; INTRAVENOUS; SUBCUTANEOUS at 12:41

## 2023-08-20 ASSESSMENT — PAIN DESCRIPTION - ORIENTATION
ORIENTATION: LEFT
ORIENTATION: LEFT

## 2023-08-20 ASSESSMENT — PAIN SCALES - GENERAL
PAINLEVEL_OUTOF10: 0
PAINLEVEL_OUTOF10: 7
PAINLEVEL_OUTOF10: 6
PAINLEVEL_OUTOF10: 4

## 2023-08-20 ASSESSMENT — PAIN DESCRIPTION - LOCATION
LOCATION: RIB CAGE;ABDOMEN
LOCATION: CHEST
LOCATION: ABDOMEN

## 2023-08-20 ASSESSMENT — PAIN DESCRIPTION - DESCRIPTORS
DESCRIPTORS: JABBING;STABBING
DESCRIPTORS: SHARP

## 2023-08-20 ASSESSMENT — PAIN - FUNCTIONAL ASSESSMENT: PAIN_FUNCTIONAL_ASSESSMENT: ACTIVITIES ARE NOT PREVENTED

## 2023-08-20 NOTE — PLAN OF CARE
Problem: Discharge Planning  Goal: Discharge to home or other facility with appropriate resources  8/20/2023 0049 by Suyapa Sinclair RN  Outcome: Progressing  Flowsheets (Taken 8/19/2023 2006)  Discharge to home or other facility with appropriate resources:   Identify barriers to discharge with patient and caregiver   Arrange for needed discharge resources and transportation as appropriate  8/19/2023 1857 by Pasquale Baeza RN  Outcome: Progressing  Flowsheets (Taken 8/19/2023 0810)  Discharge to home or other facility with appropriate resources:   Identify barriers to discharge with patient and caregiver   Arrange for needed discharge resources and transportation as appropriate   Identify discharge learning needs (meds, wound care, etc)   Refer to discharge planning if patient needs post-hospital services based on physician order or complex needs related to functional status, cognitive ability or social support system     Problem: Skin/Tissue Integrity  Goal: Absence of new skin breakdown  Description: 1. Monitor for areas of redness and/or skin breakdown  2. Assess vascular access sites hourly  3. Every 4-6 hours minimum:  Change oxygen saturation probe site  4. Every 4-6 hours:  If on nasal continuous positive airway pressure, respiratory therapy assess nares and determine need for appliance change or resting period.   8/20/2023 0049 by Celine Null RN  Outcome: Progressing  8/19/2023 1857 by Pasquale Baeza RN  Outcome: Progressing     Problem: Safety - Adult  Goal: Free from fall injury  8/20/2023 0049 by Suyapa Lee RN  Outcome: Progressing  8/19/2023 1857 by Pasquale Baeza RN  Outcome: Progressing     Problem: Cardiovascular - Adult  Goal: Maintains optimal cardiac output and hemodynamic stability  8/20/2023 0049 by Suyapa Lee RN  Outcome: Progressing  Flowsheets (Taken 8/19/2023 2006)  Maintains optimal cardiac output and hemodynamic stability:   Monitor blood pressure and heart rate   Monitor urine labs and assess for signs and symptoms of volume excess or deficit   Monitor intake, output and patient weight   Monitor urine specific gravity, serum osmolarity and serum sodium as indicated or ordered  8/19/2023 1857 by Julia Dexter RN  Outcome: Progressing  Flowsheets (Taken 8/19/2023 0810)  Hemodynamic stability and optimal renal function maintained: Monitor labs and assess for signs and symptoms of volume excess or deficit  Goal: Glucose maintained within prescribed range  8/20/2023 0049 by Suyapa Cerrato RN  Outcome: Progressing  Flowsheets (Taken 8/19/2023 2006)  Glucose maintained within prescribed range:   Monitor blood glucose as ordered   Assess for signs and symptoms of hyperglycemia and hypoglycemia   Administer ordered medications to maintain glucose within target range  8/19/2023 1857 by Julia Dexter RN  Outcome: Progressing  Flowsheets (Taken 8/19/2023 0810)  Glucose maintained within prescribed range: Monitor blood glucose as ordered     Problem: Pain  Goal: Verbalizes/displays adequate comfort level or baseline comfort level  8/20/2023 0049 by Suyapa Sinclair RN  Outcome: Progressing  Flowsheets (Taken 8/19/2023 2006)  Verbalizes/displays adequate comfort level or baseline comfort level:   Encourage patient to monitor pain and request assistance   Assess pain using appropriate pain scale   Administer analgesics based on type and severity of pain and evaluate response  8/19/2023 1857 by Julia Dexter RN  Outcome: Progressing     Problem: Nutrition Deficit:  Goal: Optimize nutritional status  8/20/2023 0049 by Suyapa Sinclair RN  Outcome: Progressing  8/19/2023 1857 by Julia Dexter RN  Outcome: Progressing

## 2023-08-20 NOTE — PROGRESS NOTES
Ordered runs of K+ for AM potassium of 2.3, with repeat bmp after runs complete. 0730 Bedside shift change report given to Salomon Bradford (oncoming nurse) by Jean Coreas (offgoing nurse). Report included the following information Nurse Handoff Report.

## 2023-08-20 NOTE — PROGRESS NOTES
1945-Report received from [address], RN.     2010-Patient vomiting and requesting ice chips and orange juice. Educated patient on vomiting and ice chips/liquid consumption. Pt requested something for 5/10 abdominal pain. PRN Zofran and Dilaudid given. Will continue to monitor.

## 2023-08-20 NOTE — PROGRESS NOTES
Bedside shift change report given to Ashutosh Meyers RN (oncoming nurse) by April, AYESHA (offgoing nurse). Report included the following information Nurse Handoff Report, Index, Intake/Output, MAR, Recent Results, and Cardiac Rhythm Sinus braulio/NSR .

## 2023-08-20 NOTE — CONSULTS
[facility]                    Cardiology Care Note     [x]Initial Encounter     []Follow-up    Patient Name: Cortez Guardado - QCN:0/54/2081 - DBL:[id number]       Reason for encounter: bradycardia, pauses    HPI:       Cortez Guardado is a 77 y.o. male with admit for gastric outlet obstruction, gastric emphysema. Subjective:    Cardiology is consulted for bradycardia, pauses. Denies any prior cardiac history. No chest pains. Breathing ok. No syncope. Assessment and Plan     Bradycardia, pauses. Sinus bradycardia and HR mostly in 50s bpm. Occasional 3 second pauses, but asymptomatic. In setting of abdominal pain/gastric emphysema, pauses are secondary to increased vagal tone. Not on any medications that would effect heart rate. No indication for pacemaker and does not reflect an intrinsic electrical issue. No additional cardiac evaluation indicated at this time and will be available prn       ____________________________________________________________    Cardiac testing  No results found for this or any previous visit. No results found for this or any previous visit. No results found for this or any previous visit. Most recent HS troponins:  No results for input(s): TROPHS in the last 72 hours.     Invalid input(s):  CKMB    ECG: normal sinus rhythm, RBBB    Review of Systems:    [x]All other systems reviewed and all negative except as written in HPI    [] Patient unable to provide secondary to condition    Past Medical History:   Diagnosis Date    BPH (benign prostatic hyperplasia)     Cannabis dependence in remission ([address])     Cocaine dependence in remission ([address])     Depression     Erectile dysfunction     GERD (gastroesophageal reflux disease)     Hepatitis C virus     HTN (hypertension)     Lung cancer ([address])     Polysubstance abuse ([address])     Primary open angle glaucoma     Prostate cancer ([address])     Unilateral primary

## 2023-08-20 NOTE — PROGRESS NOTES
Hospitalist Progress Note  Ernesto Roa MD  Answering service: [phone]        Date of Service:  2023  NAME:  Delvis Client  :  1955  MRN:  [id number]      Admission Summary:     Patient transferred from [facility] with gastric outlet obstruction and gastric emphysema. Interval history / Subjective:     Patient has no acute complaint. Assessment & Plan:     Gastric emphysema  -Patient presented with abdominal pain, distention, inability to tolerate p.o. and significant weight loss  -Initial CT abdomen and pelvis shows massive gastric distention with findings suspicious for gastric wall emphysema  -Follow-up upper GI series with mildly dilated stomach, no barium seen passing through the pylorus  -S/p EGD shows friable mass in the duodenum, s/p biopsy, pathology negative for malignancy  -GI and general surgery following, also palliative care following  -Continuing TPN, further plan per general surgery    Hypotension  -Patient's blood pressure was low yesterday with systolic in the 32L, patient was given albumin, midodrine and IV fluid with improvement in blood pressure  -No other clinical signs or symptoms of sepsis, blood pressure improved today    Sinus pauses  -Cardiology evaluated the patient and no cardiac work-up or intervention was recommended    Hypernatremia  -Resolved    History of lung cancer  -Patient follows with oncology at [facility]    Anemia  -This is likely anemia of chronic disease given patient's history of malignancy  -Monitor H&H    BPH  -Holding p.o. meds as patient cannot tolerate p.o. intake    Severe protein calorie malnutrition  -Continue TPN, nutrition following     Code status: Full  Prophylaxis: SCDs  Care Plan discussed with: Patient  Anticipated Disposition: Likely more than 48 hours             Review of Systems:   Pertinent items are noted in HPI.

## 2023-08-21 LAB
ALBUMIN SERPL-MCNC: 2.4 G/DL (ref 3.5–5)
ALBUMIN/GLOB SERPL: 0.8 (ref 1.1–2.2)
ALP SERPL-CCNC: 81 U/L (ref 45–117)
ALT SERPL-CCNC: 108 U/L (ref 12–78)
ANION GAP SERPL CALC-SCNC: 5 MMOL/L (ref 5–15)
AST SERPL-CCNC: ABNORMAL U/L (ref 15–37)
BILIRUB DIRECT SERPL-MCNC: 0.1 MG/DL (ref 0–0.2)
BILIRUB SERPL-MCNC: 0.6 MG/DL (ref 0.2–1)
BUN SERPL-MCNC: 21 MG/DL (ref 6–20)
BUN/CREAT SERPL: 53 (ref 12–20)
CALCIUM SERPL-MCNC: 8.3 MG/DL (ref 8.5–10.1)
CHLORIDE SERPL-SCNC: 109 MMOL/L (ref 97–108)
CO2 SERPL-SCNC: 26 MMOL/L (ref 21–32)
CREAT SERPL-MCNC: 0.4 MG/DL (ref 0.7–1.3)
GLOBULIN SER CALC-MCNC: 3 G/DL (ref 2–4)
GLUCOSE BLD STRIP.AUTO-MCNC: 91 MG/DL (ref 65–117)
GLUCOSE BLD STRIP.AUTO-MCNC: 93 MG/DL (ref 65–117)
GLUCOSE BLD STRIP.AUTO-MCNC: 93 MG/DL (ref 65–117)
GLUCOSE SERPL-MCNC: 109 MG/DL (ref 65–100)
MAGNESIUM SERPL-MCNC: 2.1 MG/DL (ref 1.6–2.4)
PHOSPHATE SERPL-MCNC: 1.3 MG/DL (ref 2.6–4.7)
POTASSIUM SERPL-SCNC: ABNORMAL MMOL/L (ref 3.5–5.1)
PROT SERPL-MCNC: 5.4 G/DL (ref 6.4–8.2)
SERVICE CMNT-IMP: NORMAL
SODIUM SERPL-SCNC: 140 MMOL/L (ref 136–145)

## 2023-08-21 PROCEDURE — 2580000003 HC RX 258: Performed by: FAMILY MEDICINE

## 2023-08-21 PROCEDURE — 2580000003 HC RX 258: Performed by: SURGERY

## 2023-08-21 PROCEDURE — 82962 GLUCOSE BLOOD TEST: CPT

## 2023-08-21 PROCEDURE — 6370000000 HC RX 637 (ALT 250 FOR IP): Performed by: FAMILY MEDICINE

## 2023-08-21 PROCEDURE — 36415 COLL VENOUS BLD VENIPUNCTURE: CPT

## 2023-08-21 PROCEDURE — 2500000003 HC RX 250 WO HCPCS: Performed by: FAMILY MEDICINE

## 2023-08-21 PROCEDURE — 2580000003 HC RX 258: Performed by: INTERNAL MEDICINE

## 2023-08-21 PROCEDURE — 2500000003 HC RX 250 WO HCPCS: Performed by: SURGERY

## 2023-08-21 PROCEDURE — 84100 ASSAY OF PHOSPHORUS: CPT

## 2023-08-21 PROCEDURE — 6360000002 HC RX W HCPCS: Performed by: SURGERY

## 2023-08-21 PROCEDURE — 80076 HEPATIC FUNCTION PANEL: CPT

## 2023-08-21 PROCEDURE — 99231 SBSQ HOSP IP/OBS SF/LOW 25: CPT | Performed by: SURGERY

## 2023-08-21 PROCEDURE — 2060000000 HC ICU INTERMEDIATE R&B

## 2023-08-21 PROCEDURE — 83735 ASSAY OF MAGNESIUM: CPT

## 2023-08-21 PROCEDURE — 80048 BASIC METABOLIC PNL TOTAL CA: CPT

## 2023-08-21 RX ADMIN — SODIUM CHLORIDE, PRESERVATIVE FREE 10 ML: 5 INJECTION INTRAVENOUS at 09:28

## 2023-08-21 RX ADMIN — HYDROMORPHONE HYDROCHLORIDE 1 MG: 1 INJECTION, SOLUTION INTRAMUSCULAR; INTRAVENOUS; SUBCUTANEOUS at 15:44

## 2023-08-21 RX ADMIN — MIDODRINE HYDROCHLORIDE 10 MG: 5 TABLET ORAL at 18:56

## 2023-08-21 RX ADMIN — I.V. FAT EMULSION 250 ML: 20 EMULSION INTRAVENOUS at 18:57

## 2023-08-21 RX ADMIN — POTASSIUM CHLORIDE: 2 INJECTION, SOLUTION, CONCENTRATE INTRAVENOUS at 18:57

## 2023-08-21 RX ADMIN — HYDROMORPHONE HYDROCHLORIDE 1 MG: 1 INJECTION, SOLUTION INTRAMUSCULAR; INTRAVENOUS; SUBCUTANEOUS at 18:56

## 2023-08-21 RX ADMIN — MIDODRINE HYDROCHLORIDE 10 MG: 5 TABLET ORAL at 09:27

## 2023-08-21 RX ADMIN — HYDROMORPHONE HYDROCHLORIDE 1 MG: 1 INJECTION, SOLUTION INTRAMUSCULAR; INTRAVENOUS; SUBCUTANEOUS at 09:30

## 2023-08-21 RX ADMIN — MIDODRINE HYDROCHLORIDE 10 MG: 5 TABLET ORAL at 12:34

## 2023-08-21 RX ADMIN — POTASSIUM PHOSPHATE, MONOBASIC AND POTASSIUM PHOSPHATE, DIBASIC 15 MMOL: 224; 236 INJECTION, SOLUTION, CONCENTRATE INTRAVENOUS at 09:41

## 2023-08-21 RX ADMIN — POTASSIUM PHOSPHATE, MONOBASIC AND POTASSIUM PHOSPHATE, DIBASIC 15 MMOL: 224; 236 INJECTION, SOLUTION, CONCENTRATE INTRAVENOUS at 19:02

## 2023-08-21 ASSESSMENT — PAIN DESCRIPTION - LOCATION
LOCATION: ARM

## 2023-08-21 ASSESSMENT — PAIN SCALES - GENERAL
PAINLEVEL_OUTOF10: 7
PAINLEVEL_OUTOF10: 4

## 2023-08-21 ASSESSMENT — PAIN DESCRIPTION - ORIENTATION
ORIENTATION: LEFT

## 2023-08-21 ASSESSMENT — PAIN DESCRIPTION - DESCRIPTORS
DESCRIPTORS: ACHING

## 2023-08-21 NOTE — PROGRESS NOTES
Bedside shift change report given to Diana Hernandez RN (oncoming nurse) by April, RN (offgoing nurse). Report included the following information Nurse Handoff Report, Index, Intake/Output, MAR, and Cardiac Rhythm Sinus braulio-NSR .

## 2023-08-21 NOTE — PROGRESS NOTES
Comprehensive Nutrition Assessment    Type and Reason for Visit: Reassess    Nutrition Recommendations/Plan:     Replete Phos! Recommend at least 30 mmol  Recommend rechecking Phos s/p repletion today and giving more if needed  Recheck K+ and replete if needed    Would not advance TPN any further until his lytes have been corrected    TPN goal: 100 gm AA, 300 gm dex @ 75 mL/hr + 250 mL 20% lipids daily         Malnutrition Assessment:  Malnutrition Status:  Severe malnutrition (08/11/23 1522)    Context:  Chronic Illness     Findings of the 6 clinical characteristics of malnutrition:  Energy Intake:  75% or less estimated energy requirements for 1 month or longer  Weight Loss:  Greater than 10% over 6 months     Body Fat Loss:  Severe body fat loss Triceps, Fat Overlying Ribs   Muscle Mass Loss:  Severe muscle mass loss Temples (temporalis), Clavicles (pectoralis & deltoids), Calf (gastrocnemius), Hand (interosseous)  Fluid Accumulation:  No significant fluid accumulation     Strength:  Not Performed       Nutrition Assessment:    Past Medical History:   Diagnosis Date    BPH (benign prostatic hyperplasia)     Cannabis dependence in remission ([address])     Cocaine dependence in remission ([address])     Depression     Erectile dysfunction     GERD (gastroesophageal reflux disease)     Hepatitis C virus     HTN (hypertension)     Lung cancer (HCC)     Polysubstance abuse ([address])     Primary open angle glaucoma     Prostate cancer (HCC)     Unilateral primary osteoarthritis, right knee      Pt admitted with Gastric emphysema. GI and surgery consulted. GI work-up deferred for now, recommends plans per surgery. Per surgery \"Patient states that he began with vomiting in March 2023, associated with 60# weight loss since that time. He is not able to eat solid food. He is tries to drink Ensure shakes but burps and then spits those up too. \"  Pt with a recent hx of lung cancer and underwent surgery in March 2023.     Severe PCM -

## 2023-08-21 NOTE — PROGRESS NOTES
Bedside and Verbal shift change report given to April (oncoming nurse) by Eyad Dinh (offgoing nurse).  Report included the following information Nurse Handoff Report, Index, Intake/Output, MAR, and Cardiac Rhythm SR .

## 2023-08-21 NOTE — PROGRESS NOTES
Hospitalist Progress Note  Berny Bishop MD  Answering service: [phone]        Date of Service:  2023  NAME:  Sydni Daily  :  1955  MRN:  [id number]      Admission Summary:     Patient transferred from [facility] with gastric outlet obstruction and gastric emphysema. Interval history / Subjective:     Patient has no acute complaint.      Assessment & Plan:     Gastric emphysema  -Patient presented with abdominal pain, distention, inability to tolerate p.o. and significant weight loss  -Initial CT abdomen and pelvis shows massive gastric distention with findings suspicious for gastric wall emphysema  -Follow-up upper GI series with mildly dilated stomach, no barium seen passing through the pylorus  -S/p EGD shows friable mass in the duodenum, s/p biopsy, pathology negative for malignancy  -GI and general surgery following, also palliative care following  -Continuing TPN, plan for gastrojejunostomy tube on Wednesday    Hypotension  -Patient's blood pressure was low yesterday with systolic in the 58D, patient was given albumin, midodrine and IV fluid with improvement in blood pressure  -No other clinical signs or symptoms of sepsis, blood pressure improved today    Sinus pauses  -Cardiology evaluated the patient and no cardiac work-up or intervention was recommended    Hypernatremia  -Resolved    History of lung cancer  -Patient follows with oncology at [facility]    Anemia  -This is likely anemia of chronic disease given patient's history of malignancy  -Monitor H&H    BPH  -Holding p.o. meds as patient cannot tolerate p.o. intake    Severe protein calorie malnutrition  -Continue TPN, nutrition following     Code status: Full  Prophylaxis: SCDs  Care Plan discussed with: Patient  Anticipated Disposition: Likely more than 48 hours             Review of Systems:   Pertinent items are

## 2023-08-21 NOTE — PROGRESS NOTES
Mr. Karthik Corey reports heartburn today. Tm 99.1 Tc 98.9 HR: 79 BP: 113/72 Resp Rate: 16 100% sat on room air. No intake or output data in the 24 hours ending 08/21/23 0903   Exam: Cor: RRR. Lungs: Bilateral breath sounds. Abd: Soft. Non distended. Non tender. No guarding or rebound. Labs:   Recent Results (from the past 12 hour(s))   Basic Metabolic Panel    Collection Time: 08/21/23  5:04 AM   Result Value Ref Range    Sodium 140 136 - 145 mmol/L    Potassium HEMOLYZED SPECIMEN 3.5 - 5.1 mmol/L    Chloride 109 (H) 97 - 108 mmol/L    CO2 26 21 - 32 mmol/L    Anion Gap 5 5 - 15 mmol/L    Glucose 109 (H) 65 - 100 mg/dL    BUN 21 (H) 6 - 20 MG/DL    Creatinine 0.40 (L) 0.70 - 1.30 MG/DL    Bun/Cre Ratio 53 (H) 12 - 20      Est, Glom Filt Rate >60 >60 ml/min/1.73m2    Calcium 8.3 (L) 8.5 - 10.1 MG/DL   Magnesium    Collection Time: 08/21/23  5:04 AM   Result Value Ref Range    Magnesium 2.1 1.6 - 2.4 mg/dL   Phosphorus    Collection Time: 08/21/23  5:04 AM   Result Value Ref Range    Phosphorus 1.3 (L) 2.6 - 4.7 MG/DL   Hepatic Function Panel    Collection Time: 08/21/23  5:04 AM   Result Value Ref Range    Total Protein 5.4 (L) 6.4 - 8.2 g/dL    Albumin 2.4 (L) 3.5 - 5.0 g/dL    Globulin 3.0 2.0 - 4.0 g/dL    Albumin/Globulin Ratio 0.8 (L) 1.1 - 2.2      Total Bilirubin 0.6 0.2 - 1.0 MG/DL    Bilirubin, Direct 0.1 0.0 - 0.2 MG/DL    Alk Phosphatase 81 45 - 117 U/L    AST HEMOLYZED SPECIMEN 15 - 37 U/L     (H) 12 - 78 U/L   POCT Glucose    Collection Time: 08/21/23  8:17 AM   Result Value Ref Range    POC Glucose 93 65 - 117 mg/dL    Performed by: Leslie Gilliam    Reviewed pathology from EGD - No malignancy. Mr. Karthik Corey is a 78 yo man with a gastric outlet obstruction. He should benefit from gastrojejunostomy. Discussed procedure with Mr. Karthik Corey including risks of bleeding, infection, anastomotic leak. He understands and wishes to proceed.    Needs

## 2023-08-21 NOTE — PLAN OF CARE
Problem: Discharge Planning  Goal: Discharge to home or other facility with appropriate resources  Outcome: Progressing  Flowsheets (Taken 8/21/2023 1541)  Discharge to home or other facility with appropriate resources:   Identify barriers to discharge with patient and caregiver   Arrange for needed discharge resources and transportation as appropriate   Identify discharge learning needs (meds, wound care, etc)     Problem: Skin/Tissue Integrity  Goal: Absence of new skin breakdown  Description: 1. Monitor for areas of redness and/or skin breakdown  2. Assess vascular access sites hourly  3. Every 4-6 hours minimum:  Change oxygen saturation probe site  4. Every 4-6 hours:  If on nasal continuous positive airway pressure, respiratory therapy assess nares and determine need for appliance change or resting period.   Outcome: Progressing     Problem: Safety - Adult  Goal: Free from fall injury  Outcome: Progressing     Problem: Cardiovascular - Adult  Goal: Maintains optimal cardiac output and hemodynamic stability  Outcome: Progressing  Flowsheets (Taken 8/21/2023 154)  Maintains optimal cardiac output and hemodynamic stability:   Monitor blood pressure and heart rate   Monitor urine output and notify Licensed Independent Practitioner for values outside of normal range   Assess for signs of decreased cardiac output  Goal: Absence of cardiac dysrhythmias or at baseline  Outcome: Progressing  Flowsheets (Taken 8/21/2023 1544)  Absence of cardiac dysrhythmias or at baseline:   Monitor cardiac rate and rhythm   Assess for signs of decreased cardiac output     Problem: Gastrointestinal - Adult  Goal: Minimal or absence of nausea and vomiting  Outcome: Progressing  Flowsheets (Taken 8/21/2023 1544)  Minimal or absence of nausea and vomiting:   Administer IV fluids as ordered to ensure adequate hydration   Maintain NPO status until nausea and vomiting are resolved   Administer ordered antiemetic medications as needed

## 2023-08-22 ENCOUNTER — ANESTHESIA EVENT (OUTPATIENT)
Facility: HOSPITAL | Age: 68
End: 2023-08-22
Payer: MEDICARE

## 2023-08-22 LAB
ANION GAP SERPL CALC-SCNC: 3 MMOL/L (ref 5–15)
BUN SERPL-MCNC: 18 MG/DL (ref 6–20)
BUN/CREAT SERPL: 51 (ref 12–20)
CALCIUM SERPL-MCNC: 8.5 MG/DL (ref 8.5–10.1)
CHLORIDE SERPL-SCNC: 110 MMOL/L (ref 97–108)
CO2 SERPL-SCNC: 30 MMOL/L (ref 21–32)
CREAT SERPL-MCNC: 0.35 MG/DL (ref 0.7–1.3)
GLUCOSE BLD STRIP.AUTO-MCNC: 114 MG/DL (ref 65–117)
GLUCOSE BLD STRIP.AUTO-MCNC: 88 MG/DL (ref 65–117)
GLUCOSE BLD STRIP.AUTO-MCNC: 91 MG/DL (ref 65–117)
GLUCOSE BLD STRIP.AUTO-MCNC: 98 MG/DL (ref 65–117)
GLUCOSE SERPL-MCNC: 107 MG/DL (ref 65–100)
MAGNESIUM SERPL-MCNC: 2.3 MG/DL (ref 1.6–2.4)
PHOSPHATE SERPL-MCNC: 2.1 MG/DL (ref 2.6–4.7)
POTASSIUM SERPL-SCNC: 3.6 MMOL/L (ref 3.5–5.1)
SERVICE CMNT-IMP: NORMAL
SODIUM SERPL-SCNC: 143 MMOL/L (ref 136–145)

## 2023-08-22 PROCEDURE — 6360000002 HC RX W HCPCS: Performed by: SURGERY

## 2023-08-22 PROCEDURE — 2500000003 HC RX 250 WO HCPCS: Performed by: SURGERY

## 2023-08-22 PROCEDURE — 82962 GLUCOSE BLOOD TEST: CPT

## 2023-08-22 PROCEDURE — 2580000003 HC RX 258: Performed by: INTERNAL MEDICINE

## 2023-08-22 PROCEDURE — 2060000000 HC ICU INTERMEDIATE R&B

## 2023-08-22 PROCEDURE — 2580000003 HC RX 258: Performed by: SURGERY

## 2023-08-22 PROCEDURE — 99233 SBSQ HOSP IP/OBS HIGH 50: CPT | Performed by: NURSE PRACTITIONER

## 2023-08-22 PROCEDURE — 6370000000 HC RX 637 (ALT 250 FOR IP): Performed by: FAMILY MEDICINE

## 2023-08-22 PROCEDURE — 80048 BASIC METABOLIC PNL TOTAL CA: CPT

## 2023-08-22 PROCEDURE — 36415 COLL VENOUS BLD VENIPUNCTURE: CPT

## 2023-08-22 PROCEDURE — 84100 ASSAY OF PHOSPHORUS: CPT

## 2023-08-22 PROCEDURE — 51798 US URINE CAPACITY MEASURE: CPT

## 2023-08-22 PROCEDURE — 99231 SBSQ HOSP IP/OBS SF/LOW 25: CPT | Performed by: SURGERY

## 2023-08-22 PROCEDURE — 83735 ASSAY OF MAGNESIUM: CPT

## 2023-08-22 RX ADMIN — MIDODRINE HYDROCHLORIDE 10 MG: 5 TABLET ORAL at 08:23

## 2023-08-22 RX ADMIN — POTASSIUM PHOSPHATE, MONOBASIC POTASSIUM PHOSPHATE, DIBASIC 15 MMOL: 224; 236 INJECTION, SOLUTION, CONCENTRATE INTRAVENOUS at 10:54

## 2023-08-22 RX ADMIN — HYDROMORPHONE HYDROCHLORIDE 1 MG: 1 INJECTION, SOLUTION INTRAMUSCULAR; INTRAVENOUS; SUBCUTANEOUS at 13:45

## 2023-08-22 RX ADMIN — SODIUM CHLORIDE, PRESERVATIVE FREE 10 ML: 5 INJECTION INTRAVENOUS at 22:16

## 2023-08-22 RX ADMIN — MIDODRINE HYDROCHLORIDE 10 MG: 5 TABLET ORAL at 18:03

## 2023-08-22 RX ADMIN — POTASSIUM CHLORIDE: 2 INJECTION, SOLUTION, CONCENTRATE INTRAVENOUS at 18:02

## 2023-08-22 RX ADMIN — SODIUM CHLORIDE, PRESERVATIVE FREE 10 ML: 5 INJECTION INTRAVENOUS at 08:23

## 2023-08-22 RX ADMIN — HYDROMORPHONE HYDROCHLORIDE 1 MG: 1 INJECTION, SOLUTION INTRAMUSCULAR; INTRAVENOUS; SUBCUTANEOUS at 10:54

## 2023-08-22 RX ADMIN — HYDROMORPHONE HYDROCHLORIDE 1 MG: 1 INJECTION, SOLUTION INTRAMUSCULAR; INTRAVENOUS; SUBCUTANEOUS at 08:23

## 2023-08-22 RX ADMIN — HYDROMORPHONE HYDROCHLORIDE 1 MG: 1 INJECTION, SOLUTION INTRAMUSCULAR; INTRAVENOUS; SUBCUTANEOUS at 18:03

## 2023-08-22 RX ADMIN — MIDODRINE HYDROCHLORIDE 10 MG: 5 TABLET ORAL at 13:45

## 2023-08-22 RX ADMIN — POTASSIUM PHOSPHATE, MONOBASIC POTASSIUM PHOSPHATE, DIBASIC 15 MMOL: 224; 236 INJECTION, SOLUTION, CONCENTRATE INTRAVENOUS at 22:16

## 2023-08-22 RX ADMIN — I.V. FAT EMULSION 250 ML: 20 EMULSION INTRAVENOUS at 18:02

## 2023-08-22 RX ADMIN — HYDROMORPHONE HYDROCHLORIDE 1 MG: 1 INJECTION, SOLUTION INTRAMUSCULAR; INTRAVENOUS; SUBCUTANEOUS at 04:35

## 2023-08-22 ASSESSMENT — PAIN DESCRIPTION - LOCATION
LOCATION: ABDOMEN

## 2023-08-22 ASSESSMENT — PAIN DESCRIPTION - ORIENTATION
ORIENTATION: LOWER
ORIENTATION: LOWER

## 2023-08-22 ASSESSMENT — PAIN SCALES - WONG BAKER
WONGBAKER_NUMERICALRESPONSE: 0
WONGBAKER_NUMERICALRESPONSE: 0

## 2023-08-22 ASSESSMENT — PAIN SCALES - GENERAL
PAINLEVEL_OUTOF10: 5
PAINLEVEL_OUTOF10: 9
PAINLEVEL_OUTOF10: 0
PAINLEVEL_OUTOF10: 7
PAINLEVEL_OUTOF10: 0
PAINLEVEL_OUTOF10: 5

## 2023-08-22 ASSESSMENT — PAIN DESCRIPTION - DESCRIPTORS: DESCRIPTORS: THROBBING

## 2023-08-22 NOTE — PROGRESS NOTES
NUTRITION brief    Recommendations: For TPN on 8/23:  -Increase AA to 100 gm (goal)  -If Phos WNL, increase dex to 250 gm    Diet advancement per surgery post-op    Would continue TPN at goal until tolerating >50% of meals beyond clear liquids       Diet: NPO  Nutrition Support: TPN @ 75 mL/hr with 85 gm AA, 200 gm dex + 250 mL 20% lipids daily   Nutrition-related meds: midodrine, K Phos    New events impacting nutrition plan of care: appreciate coordination and clarification from palliative care. Pt is to have a ex lap with gastrojejunostomy on 8/23/2023. This is not intended to be a feeding tube and goal is that patient will be able to eat post-op. For now, continues on TPN and already reordered today. Phos better, but still low. Repletion ordered. Current TPN + lipids providing 1520 kcal (28-30 kcal/kg), 85 gm pro (1.5-1.7 gm/kg) meeting 76% of kcal and 85% of his EEN (calculated for weight gain and his hypercatabolic state) respectively. Pt has been on TPN for 1 week now. I would still wait until Phos has improved more to advance to goal.  Would consider increasing AA to goal.  If able to increase dex to 250 gm and AA to 100 gm, this will provide total of 1750 kcal.    See full RD assessment from 8/21 for additional details, goals, and monitoring/evaluation.    Estimated Nutrition Needs:   Energy Requirements Based On: Kcal/kg  Weight Used for Energy Requirements: Admission (50 kg)  Energy (kcal/day): 2000 (40+ kcal/kg)  Weight Used for Protein Requirements: Admission  Protein (g/day): 100 (2 gm/kg)     Fluid (ml/day): 1 mL/kcal    Recent Labs     08/20/23  0306 08/21/23  0504 08/22/23  0715   GLUCOSE 100 109* 107*   BUN 22* 21* 18   CREATININE 0.46* 0.40* 0.35*    140 143   K 2.4* HEMOLYZED SPECIMEN 3.6   * 109* 110*   CO2 32 26 30   CALCIUM 8.2* 8.3* 8.5   PHOS 1.2* 1.3* 2.1*   MG 2.0 2.1 2.3         Recent Labs     08/19/23  1151 08/19/23  1735 08/19/23  2146 08/20/23  1141

## 2023-08-22 NOTE — PROGRESS NOTES
Mr. Emilee Hernandez has no complaints today. Tm 99 Tc 98.2 HR: 65 BP: 107/74 Resp Rate: 16 97% sat on room air. Intake/Output Summary (Last 24 hours) at 8/22/2023 0937  Last data filed at 8/22/2023 0706  Gross per 24 hour   Intake 20 ml   Output 1475 ml   Net -1455 ml   Exam: Cor: RRR. Lungs: Bilateral breath sounds. Abd: Soft. Non distended. Non tender. No associated guarding or rebound. Labs:   Recent Results (from the past 12 hour(s))   POCT Glucose    Collection Time: 08/22/23 12:07 AM   Result Value Ref Range    POC Glucose 91 65 - 117 mg/dL    Performed by: Autumn Reno RN  traveler    Basic Metabolic Panel    Collection Time: 08/22/23  7:15 AM   Result Value Ref Range    Sodium 143 136 - 145 mmol/L    Potassium 3.6 3.5 - 5.1 mmol/L    Chloride 110 (H) 97 - 108 mmol/L    CO2 30 21 - 32 mmol/L    Anion Gap 3 (L) 5 - 15 mmol/L    Glucose 107 (H) 65 - 100 mg/dL    BUN 18 6 - 20 MG/DL    Creatinine 0.35 (L) 0.70 - 1.30 MG/DL    Bun/Cre Ratio 51 (H) 12 - 20      Est, Glom Filt Rate >60 >60 ml/min/1.73m2    Calcium 8.5 8.5 - 10.1 MG/DL   Magnesium    Collection Time: 08/22/23  7:15 AM   Result Value Ref Range    Magnesium 2.3 1.6 - 2.4 mg/dL   Phosphorus    Collection Time: 08/22/23  7:15 AM   Result Value Ref Range    Phosphorus 2.1 (L) 2.6 - 4.7 MG/DL   POCT Glucose    Collection Time: 08/22/23  7:51 AM   Result Value Ref Range    POC Glucose 114 65 - 117 mg/dL    Performed by: Tully Riedel  PCT    Mr. Emilee Hernandez is a 76 yo man with a gastric outlet obstruction. To OR on August 23, 2023 for exploratory laparotomy and gastrojejunostomy. Discussed procedure again with Mr. Emilee Hernandez including risks of bleeding, infection, anastomotic leak. He understands and wishes to proceed. Consent on chart. NPO after midnight. Continue TPN and lipids. Replace Phos 2.1. Labs in AM.   OOB, Ambulate. Plans per Dr. Suki Piedra.

## 2023-08-22 NOTE — PROGRESS NOTES
Hospitalist Progress Note  Yoana Gutiérrez MD  Answering service: [phone]        Date of Service:  2023  NAME:  Marlee Rubi  :  1955  MRN:  [id number]      Admission Summary:     Patient transferred from [facility] with gastric outlet obstruction and gastric emphysema. Interval history / Subjective:     Patient has no acute complaint. Assessment & Plan:     Gastric emphysema  -Patient presented with abdominal pain, distention, inability to tolerate p.o. and significant weight loss  -Initial CT abdomen and pelvis shows massive gastric distention with findings suspicious for gastric wall emphysema  -Follow-up upper GI series with mildly dilated stomach, no barium seen passing through the pylorus  -S/p EGD shows friable mass in the duodenum, s/p biopsy, pathology negative for malignancy  -GI and general surgery following, also palliative care following  -Continuing TPN, plan for exploratory laparotomy and gastrojejunostomy on Wednesday    Hypotension  -No other clinical signs or symptoms of sepsis, blood pressure improving    Sinus pauses  -Cardiology evaluated the patient and no cardiac work-up or intervention was recommended    Hypernatremia  -Resolved    History of lung cancer  -Patient follows with oncology at [facility]    Anemia  -This is likely anemia of chronic disease given patient's history of malignancy  -Monitor H&H    BPH  -Holding p.o. meds as patient cannot tolerate p.o. intake    Severe protein calorie malnutrition  -Continue TPN, nutrition following     Code status: Full  Prophylaxis: SCDs  Care Plan discussed with: Patient  Anticipated Disposition: Likely more than 48 hours             Review of Systems:   Pertinent items are noted in HPI. Vital Signs:    Last 24hrs VS reviewed since prior progress note.  Most recent are:  Vitals:    23 1400   BP:

## 2023-08-22 NOTE — PROGRESS NOTES
Bedside shift change report given to Jeniffer Fernandes RN (oncoming nurse) by AYESHA Dale (offgoing nurse). Report included the following information Nurse Handoff Report, Index, Adult Overview, Intake/Output, MAR, Recent Results, and Cardiac Rhythm Sinus braulio-NSR .

## 2023-08-22 NOTE — PROGRESS NOTES
has 7 dogs at home. Various neighbors are caring for them while he is in the hospital.        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 Patient seen and examined. Patient is aware the mass in his duodenum is not cancer. Patient's understanding regarding surgery is that he will have a tube inserted for feedings  Reviewed with patient that Dr. Guy Mehta will be doing an exploratory laparotomy and gastrojejunostomy (reviewed in layman's terms). Explanation about this as major surgery. Patient will then be able to eat and will not require a feeding tube  Patient expressed understanding. Called the patient's niece, Allyson Chaves, discussed the surgery with Giovanni Hernandez and the patient together. Giovanni Hernandez had also thought the patient would have a  feeding tube inserted. Reviewed the plans per Dr. Guy Mehta. Giovanni Hernandez asked how long the incision would be and wanted to confirm the mass was not cancer. Patient does want to proceed with planned surgery and Giovanni Hernandez is in agreement. Discussed with patient and niece the need for time after surgery to advance the patient's diet. Also discussed the potential for possible need for rehab after surgery. Discussed code status with patient and Giovanni Hernandez. Patient elects Full Code status. Patient reports no pain or nausea at the present.  Continues with suctioning copious oral secretions  Communicated plan of care with: Palliative [facility], [address] [place] [address] Team    ADVANCE CARE PLANNING / TREATMENT PREFERENCES:     GOALS OF CARE:    proceed with exploratory laparotomy with gastrojejunostomy     []-Comfort   []-Cure   [x]-Prolong life   []-Recovery from acute illness   []-Rehabilitation  []-Other: Surgical History:   Procedure Laterality Date    UPPER GASTROINTESTINAL ENDOSCOPY N/A 8/15/2023    EGD ESOPHAGOGASTRODUODENOSCOPY performed by Ramesh Amaya MD at [facility] ENDOSCOPY      No family history on file. History reviewed, no pertinent family history.   Social History     Tobacco Use    Smoking status: Former     Types: Cigarettes    Smokeless tobacco: Never   Substance Use Topics    Alcohol use: Not on file     No Known Allergies   Current Facility-Administered Medications   Medication Dose Route Frequency    potassium phosphate 15 mmol in sodium chloride 0.9 % 250 mL IVPB  15 mmol IntraVENous BID    PN-Adult 2-in-1 Central Line (Standard)   IntraVENous Continuous TPN    PN-Adult 2-in-1 Central Line (Standard)   IntraVENous Continuous TPN    midodrine (PROAMATINE) tablet 10 mg  10 mg Oral TID WC    fat emulsion (INTRALIPID/NUTRILIPID) 20 % infusion 250 mL  250 mL IntraVENous Q24H    glucose chewable tablet 16 g  4 tablet Oral PRN    dextrose bolus 10% 125 mL  125 mL IntraVENous PRN    Or    dextrose bolus 10% 250 mL  250 mL IntraVENous PRN    glucagon injection 1 mg  1 mg SubCUTAneous PRN    dextrose 10 % infusion   IntraVENous Continuous PRN    sodium chloride flush 0.9 % injection 5-40 mL  5-40 mL IntraVENous 2 times per day    sodium chloride flush 0.9 % injection 5-40 mL  5-40 mL IntraVENous PRN    0.9 % sodium chloride infusion   IntraVENous PRN    acetaminophen (TYLENOL) tablet 1,000 mg  1,000 mg Oral Q6H PRN    ondansetron (ZOFRAN-ODT) disintegrating tablet 4 mg  4 mg Oral Q6H PRN    sodium chloride flush 0.9 % injection 5-40 mL  5-40 mL IntraVENous 2 times per day    sodium chloride flush 0.9 % injection 5-40 mL  5-40 mL IntraVENous PRN    polyethylene glycol (GLYCOLAX) packet 17 g  17 g Oral Daily PRN    naloxone (NARCAN) injection 0.4 mg  0.4 mg IntraVENous PRN    HYDROmorphone HCl PF (DILAUDID) injection 1 mg  1 mg IntraVENous Q3H PRN          LAB AND IMAGING FINDINGS:     Lab Results   Component

## 2023-08-23 ENCOUNTER — ANESTHESIA (OUTPATIENT)
Facility: HOSPITAL | Age: 68
End: 2023-08-23
Payer: MEDICARE

## 2023-08-23 ENCOUNTER — APPOINTMENT (OUTPATIENT)
Facility: HOSPITAL | Age: 68
DRG: 326 | End: 2023-08-23
Attending: STUDENT IN AN ORGANIZED HEALTH CARE EDUCATION/TRAINING PROGRAM
Payer: MEDICARE

## 2023-08-23 LAB
ALBUMIN SERPL-MCNC: 2.6 G/DL (ref 3.5–5)
ALBUMIN/GLOB SERPL: 1 (ref 1.1–2.2)
ALP SERPL-CCNC: 79 U/L (ref 45–117)
ALT SERPL-CCNC: 108 U/L (ref 12–78)
ANION GAP SERPL CALC-SCNC: 5 MMOL/L (ref 5–15)
APPEARANCE UR: CLEAR
AST SERPL-CCNC: 67 U/L (ref 15–37)
BACTERIA URNS QL MICRO: NEGATIVE /HPF
BILIRUB DIRECT SERPL-MCNC: 0.2 MG/DL (ref 0–0.2)
BILIRUB SERPL-MCNC: 0.7 MG/DL (ref 0.2–1)
BILIRUB UR QL CFM: NEGATIVE
BUN SERPL-MCNC: 21 MG/DL (ref 6–20)
BUN/CREAT SERPL: 60 (ref 12–20)
CALCIUM SERPL-MCNC: 8.4 MG/DL (ref 8.5–10.1)
CHLORIDE SERPL-SCNC: 113 MMOL/L (ref 97–108)
CO2 SERPL-SCNC: 27 MMOL/L (ref 21–32)
COLOR UR: ABNORMAL
CREAT SERPL-MCNC: 0.35 MG/DL (ref 0.7–1.3)
EPITH CASTS URNS QL MICRO: ABNORMAL /LPF
ERYTHROCYTE [DISTWIDTH] IN BLOOD BY AUTOMATED COUNT: 14.6 % (ref 11.5–14.5)
GLOBULIN SER CALC-MCNC: 2.7 G/DL (ref 2–4)
GLUCOSE BLD STRIP.AUTO-MCNC: 108 MG/DL (ref 65–117)
GLUCOSE BLD STRIP.AUTO-MCNC: 95 MG/DL (ref 65–117)
GLUCOSE BLD STRIP.AUTO-MCNC: 98 MG/DL (ref 65–117)
GLUCOSE BLD STRIP.AUTO-MCNC: 99 MG/DL (ref 65–117)
GLUCOSE SERPL-MCNC: 116 MG/DL (ref 65–100)
GLUCOSE UR STRIP.AUTO-MCNC: NEGATIVE MG/DL
HCT VFR BLD AUTO: 27.8 % (ref 36.6–50.3)
HGB BLD-MCNC: 9 G/DL (ref 12.1–17)
HGB UR QL STRIP: NEGATIVE
HYALINE CASTS URNS QL MICRO: ABNORMAL /LPF (ref 0–5)
KETONES UR QL STRIP.AUTO: NEGATIVE MG/DL
LEUKOCYTE ESTERASE UR QL STRIP.AUTO: NEGATIVE
MCH RBC QN AUTO: 35.9 PG (ref 26–34)
MCHC RBC AUTO-ENTMCNC: 32.4 G/DL (ref 30–36.5)
MCV RBC AUTO: 110.8 FL (ref 80–99)
NITRITE UR QL STRIP.AUTO: NEGATIVE
NRBC # BLD: 0 K/UL (ref 0–0.01)
NRBC BLD-RTO: 0 PER 100 WBC
PH UR STRIP: 6.5 (ref 5–8)
PHOSPHATE SERPL-MCNC: 3.7 MG/DL (ref 2.6–4.7)
PLATELET # BLD AUTO: 76 K/UL (ref 150–400)
PMV BLD AUTO: 12.2 FL (ref 8.9–12.9)
POTASSIUM SERPL-SCNC: ABNORMAL MMOL/L (ref 3.5–5.1)
PROT SERPL-MCNC: 5.3 G/DL (ref 6.4–8.2)
PROT UR STRIP-MCNC: 30 MG/DL
RBC # BLD AUTO: 2.51 M/UL (ref 4.1–5.7)
RBC #/AREA URNS HPF: ABNORMAL /HPF (ref 0–5)
SERVICE CMNT-IMP: NORMAL
SODIUM SERPL-SCNC: 145 MMOL/L (ref 136–145)
SP GR UR REFRACTOMETRY: 1.02 (ref 1–1.03)
SPECIMEN HOLD: NORMAL
UROBILINOGEN UR QL STRIP.AUTO: 1 EU/DL (ref 0.2–1)
WBC # BLD AUTO: 1.9 K/UL (ref 4.1–11.1)
WBC URNS QL MICRO: ABNORMAL /HPF (ref 0–4)

## 2023-08-23 PROCEDURE — 6360000002 HC RX W HCPCS: Performed by: ANESTHESIOLOGY

## 2023-08-23 PROCEDURE — 2580000003 HC RX 258: Performed by: SURGERY

## 2023-08-23 PROCEDURE — 3700000000 HC ANESTHESIA ATTENDED CARE: Performed by: SURGERY

## 2023-08-23 PROCEDURE — 2580000003 HC RX 258: Performed by: ANESTHESIOLOGY

## 2023-08-23 PROCEDURE — 6360000002 HC RX W HCPCS: Performed by: SURGERY

## 2023-08-23 PROCEDURE — 2580000003 HC RX 258: Performed by: INTERNAL MEDICINE

## 2023-08-23 PROCEDURE — 88341 IMHCHEM/IMCYTCHM EA ADD ANTB: CPT

## 2023-08-23 PROCEDURE — 0D160ZA BYPASS STOMACH TO JEJUNUM, OPEN APPROACH: ICD-10-PCS | Performed by: SURGERY

## 2023-08-23 PROCEDURE — 3600000004 HC SURGERY LEVEL 4 BASE: Performed by: SURGERY

## 2023-08-23 PROCEDURE — 2500000003 HC RX 250 WO HCPCS: Performed by: SURGERY

## 2023-08-23 PROCEDURE — 80048 BASIC METABOLIC PNL TOTAL CA: CPT

## 2023-08-23 PROCEDURE — 2720000010 HC SURG SUPPLY STERILE: Performed by: SURGERY

## 2023-08-23 PROCEDURE — 84100 ASSAY OF PHOSPHORUS: CPT

## 2023-08-23 PROCEDURE — 3700000001 HC ADD 15 MINUTES (ANESTHESIA): Performed by: SURGERY

## 2023-08-23 PROCEDURE — 74018 RADEX ABDOMEN 1 VIEW: CPT

## 2023-08-23 PROCEDURE — 2060000000 HC ICU INTERMEDIATE R&B

## 2023-08-23 PROCEDURE — 43820 GASTROJEJUNOSTOMY WO VAGOTMY: CPT | Performed by: SURGERY

## 2023-08-23 PROCEDURE — 7100000000 HC PACU RECOVERY - FIRST 15 MIN: Performed by: SURGERY

## 2023-08-23 PROCEDURE — 2500000003 HC RX 250 WO HCPCS: Performed by: ANESTHESIOLOGY

## 2023-08-23 PROCEDURE — 44110 EXCISE INTESTINE LESION(S): CPT | Performed by: SURGERY

## 2023-08-23 PROCEDURE — 85027 COMPLETE CBC AUTOMATED: CPT

## 2023-08-23 PROCEDURE — 88331 PATH CONSLTJ SURG 1 BLK 1SPC: CPT

## 2023-08-23 PROCEDURE — 2709999900 HC NON-CHARGEABLE SUPPLY: Performed by: SURGERY

## 2023-08-23 PROCEDURE — 36415 COLL VENOUS BLD VENIPUNCTURE: CPT

## 2023-08-23 PROCEDURE — 88305 TISSUE EXAM BY PATHOLOGIST: CPT

## 2023-08-23 PROCEDURE — 7100000001 HC PACU RECOVERY - ADDTL 15 MIN: Performed by: SURGERY

## 2023-08-23 PROCEDURE — 0DD98ZX EXTRACTION OF DUODENUM, VIA NATURAL OR ARTIFICIAL OPENING ENDOSCOPIC, DIAGNOSTIC: ICD-10-PCS | Performed by: SURGERY

## 2023-08-23 PROCEDURE — 88342 IMHCHEM/IMCYTCHM 1ST ANTB: CPT

## 2023-08-23 PROCEDURE — 81001 URINALYSIS AUTO W/SCOPE: CPT

## 2023-08-23 PROCEDURE — 80076 HEPATIC FUNCTION PANEL: CPT

## 2023-08-23 PROCEDURE — 6370000000 HC RX 637 (ALT 250 FOR IP): Performed by: SURGERY

## 2023-08-23 PROCEDURE — 3600000014 HC SURGERY LEVEL 4 ADDTL 15MIN: Performed by: SURGERY

## 2023-08-23 PROCEDURE — C1765 ADHESION BARRIER: HCPCS | Performed by: SURGERY

## 2023-08-23 PROCEDURE — 6370000000 HC RX 637 (ALT 250 FOR IP): Performed by: FAMILY MEDICINE

## 2023-08-23 RX ORDER — GLYCOPYRROLATE 0.2 MG/ML
INJECTION INTRAMUSCULAR; INTRAVENOUS PRN
Status: DISCONTINUED | OUTPATIENT
Start: 2023-08-23 | End: 2023-08-23 | Stop reason: SDUPTHER

## 2023-08-23 RX ORDER — ROCURONIUM BROMIDE 10 MG/ML
INJECTION, SOLUTION INTRAVENOUS PRN
Status: DISCONTINUED | OUTPATIENT
Start: 2023-08-23 | End: 2023-08-23 | Stop reason: SDUPTHER

## 2023-08-23 RX ORDER — ACETAMINOPHEN 500 MG
1000 TABLET ORAL EVERY 6 HOURS SCHEDULED
Status: DISCONTINUED | OUTPATIENT
Start: 2023-08-23 | End: 2023-08-25

## 2023-08-23 RX ORDER — OXYCODONE HYDROCHLORIDE 5 MG/1
5 TABLET ORAL EVERY 4 HOURS PRN
Status: DISCONTINUED | OUTPATIENT
Start: 2023-08-23 | End: 2023-08-28 | Stop reason: HOSPADM

## 2023-08-23 RX ORDER — BUPIVACAINE HYDROCHLORIDE 2.5 MG/ML
INJECTION, SOLUTION EPIDURAL; INFILTRATION; INTRACAUDAL PRN
Status: DISCONTINUED | OUTPATIENT
Start: 2023-08-23 | End: 2023-08-23 | Stop reason: HOSPADM

## 2023-08-23 RX ORDER — HYDROMORPHONE HYDROCHLORIDE 2 MG/ML
INJECTION, SOLUTION INTRAMUSCULAR; INTRAVENOUS; SUBCUTANEOUS PRN
Status: DISCONTINUED | OUTPATIENT
Start: 2023-08-23 | End: 2023-08-23 | Stop reason: SDUPTHER

## 2023-08-23 RX ORDER — EPHEDRINE SULFATE/0.9% NACL/PF 50 MG/5 ML
SYRINGE (ML) INTRAVENOUS PRN
Status: DISCONTINUED | OUTPATIENT
Start: 2023-08-23 | End: 2023-08-23 | Stop reason: SDUPTHER

## 2023-08-23 RX ORDER — PHENYLEPHRINE HCL IN 0.9% NACL 0.4MG/10ML
SYRINGE (ML) INTRAVENOUS PRN
Status: DISCONTINUED | OUTPATIENT
Start: 2023-08-23 | End: 2023-08-23 | Stop reason: SDUPTHER

## 2023-08-23 RX ORDER — SODIUM CHLORIDE 0.9 % (FLUSH) 0.9 %
5-40 SYRINGE (ML) INJECTION PRN
Status: DISCONTINUED | OUTPATIENT
Start: 2023-08-23 | End: 2023-08-23

## 2023-08-23 RX ORDER — MIDAZOLAM HYDROCHLORIDE 2 MG/2ML
2 INJECTION, SOLUTION INTRAMUSCULAR; INTRAVENOUS
Status: DISCONTINUED | OUTPATIENT
Start: 2023-08-23 | End: 2023-08-23 | Stop reason: HOSPADM

## 2023-08-23 RX ORDER — SUCCINYLCHOLINE/SOD CL,ISO/PF 200MG/10ML
SYRINGE (ML) INTRAVENOUS PRN
Status: DISCONTINUED | OUTPATIENT
Start: 2023-08-23 | End: 2023-08-23 | Stop reason: SDUPTHER

## 2023-08-23 RX ORDER — ONDANSETRON 2 MG/ML
4 INJECTION INTRAMUSCULAR; INTRAVENOUS
Status: DISCONTINUED | OUTPATIENT
Start: 2023-08-23 | End: 2023-08-23

## 2023-08-23 RX ORDER — HYDROMORPHONE HYDROCHLORIDE 2 MG/ML
2 INJECTION, SOLUTION INTRAMUSCULAR; INTRAVENOUS; SUBCUTANEOUS
Status: DISCONTINUED | OUTPATIENT
Start: 2023-08-23 | End: 2023-08-28 | Stop reason: HOSPADM

## 2023-08-23 RX ORDER — PROCHLORPERAZINE EDISYLATE 5 MG/ML
5 INJECTION INTRAMUSCULAR; INTRAVENOUS
Status: DISCONTINUED | OUTPATIENT
Start: 2023-08-23 | End: 2023-08-23

## 2023-08-23 RX ORDER — LIDOCAINE HYDROCHLORIDE 10 MG/ML
1 INJECTION, SOLUTION EPIDURAL; INFILTRATION; INTRACAUDAL; PERINEURAL
Status: DISCONTINUED | OUTPATIENT
Start: 2023-08-23 | End: 2023-08-23 | Stop reason: HOSPADM

## 2023-08-23 RX ORDER — LIDOCAINE HYDROCHLORIDE 20 MG/ML
INJECTION, SOLUTION EPIDURAL; INFILTRATION; INTRACAUDAL; PERINEURAL PRN
Status: DISCONTINUED | OUTPATIENT
Start: 2023-08-23 | End: 2023-08-23 | Stop reason: SDUPTHER

## 2023-08-23 RX ORDER — SODIUM CHLORIDE 9 MG/ML
INJECTION, SOLUTION INTRAVENOUS PRN
Status: DISCONTINUED | OUTPATIENT
Start: 2023-08-23 | End: 2023-08-23

## 2023-08-23 RX ORDER — FENTANYL CITRATE 50 UG/ML
INJECTION, SOLUTION INTRAMUSCULAR; INTRAVENOUS PRN
Status: DISCONTINUED | OUTPATIENT
Start: 2023-08-23 | End: 2023-08-23 | Stop reason: SDUPTHER

## 2023-08-23 RX ORDER — OXYCODONE HYDROCHLORIDE 5 MG/1
5 TABLET ORAL
Status: DISCONTINUED | OUTPATIENT
Start: 2023-08-23 | End: 2023-08-23

## 2023-08-23 RX ORDER — DEXAMETHASONE SODIUM PHOSPHATE 4 MG/ML
INJECTION, SOLUTION INTRA-ARTICULAR; INTRALESIONAL; INTRAMUSCULAR; INTRAVENOUS; SOFT TISSUE PRN
Status: DISCONTINUED | OUTPATIENT
Start: 2023-08-23 | End: 2023-08-23 | Stop reason: SDUPTHER

## 2023-08-23 RX ORDER — SODIUM CHLORIDE 0.9 % (FLUSH) 0.9 %
5-40 SYRINGE (ML) INJECTION EVERY 12 HOURS SCHEDULED
Status: DISCONTINUED | OUTPATIENT
Start: 2023-08-23 | End: 2023-08-23

## 2023-08-23 RX ORDER — SODIUM CHLORIDE, SODIUM LACTATE, POTASSIUM CHLORIDE, CALCIUM CHLORIDE 600; 310; 30; 20 MG/100ML; MG/100ML; MG/100ML; MG/100ML
INJECTION, SOLUTION INTRAVENOUS CONTINUOUS
Status: DISCONTINUED | OUTPATIENT
Start: 2023-08-23 | End: 2023-08-23 | Stop reason: HOSPADM

## 2023-08-23 RX ORDER — HYDROMORPHONE HYDROCHLORIDE 1 MG/ML
0.5 INJECTION, SOLUTION INTRAMUSCULAR; INTRAVENOUS; SUBCUTANEOUS EVERY 5 MIN PRN
Status: DISCONTINUED | OUTPATIENT
Start: 2023-08-23 | End: 2023-08-23

## 2023-08-23 RX ORDER — SODIUM CHLORIDE 0.9 % (FLUSH) 0.9 %
5-40 SYRINGE (ML) INJECTION PRN
Status: DISCONTINUED | OUTPATIENT
Start: 2023-08-23 | End: 2023-08-23 | Stop reason: HOSPADM

## 2023-08-23 RX ORDER — ONDANSETRON 2 MG/ML
INJECTION INTRAMUSCULAR; INTRAVENOUS PRN
Status: DISCONTINUED | OUTPATIENT
Start: 2023-08-23 | End: 2023-08-23 | Stop reason: SDUPTHER

## 2023-08-23 RX ORDER — FENTANYL CITRATE 50 UG/ML
100 INJECTION, SOLUTION INTRAMUSCULAR; INTRAVENOUS
Status: DISCONTINUED | OUTPATIENT
Start: 2023-08-23 | End: 2023-08-23 | Stop reason: HOSPADM

## 2023-08-23 RX ORDER — OXYCODONE HYDROCHLORIDE 5 MG/1
10 TABLET ORAL EVERY 4 HOURS PRN
Status: DISCONTINUED | OUTPATIENT
Start: 2023-08-23 | End: 2023-08-28 | Stop reason: HOSPADM

## 2023-08-23 RX ORDER — SODIUM CHLORIDE 9 MG/ML
INJECTION, SOLUTION INTRAVENOUS PRN
Status: DISCONTINUED | OUTPATIENT
Start: 2023-08-23 | End: 2023-08-23 | Stop reason: HOSPADM

## 2023-08-23 RX ORDER — FENTANYL CITRATE 50 UG/ML
25 INJECTION, SOLUTION INTRAMUSCULAR; INTRAVENOUS EVERY 5 MIN PRN
Status: DISCONTINUED | OUTPATIENT
Start: 2023-08-23 | End: 2023-08-23

## 2023-08-23 RX ORDER — ACETAMINOPHEN 500 MG
1000 TABLET ORAL ONCE
Status: DISCONTINUED | OUTPATIENT
Start: 2023-08-23 | End: 2023-08-23 | Stop reason: HOSPADM

## 2023-08-23 RX ORDER — SODIUM CHLORIDE 0.9 % (FLUSH) 0.9 %
5-40 SYRINGE (ML) INJECTION EVERY 12 HOURS SCHEDULED
Status: DISCONTINUED | OUTPATIENT
Start: 2023-08-23 | End: 2023-08-23 | Stop reason: HOSPADM

## 2023-08-23 RX ORDER — HYDRALAZINE HYDROCHLORIDE 20 MG/ML
10 INJECTION INTRAMUSCULAR; INTRAVENOUS
Status: DISCONTINUED | OUTPATIENT
Start: 2023-08-23 | End: 2023-08-23

## 2023-08-23 RX ORDER — NEOSTIGMINE METHYLSULFATE 1 MG/ML
INJECTION, SOLUTION INTRAVENOUS PRN
Status: DISCONTINUED | OUTPATIENT
Start: 2023-08-23 | End: 2023-08-23 | Stop reason: SDUPTHER

## 2023-08-23 RX ORDER — CEFAZOLIN SODIUM 1 G/3ML
INJECTION, POWDER, FOR SOLUTION INTRAMUSCULAR; INTRAVENOUS PRN
Status: DISCONTINUED | OUTPATIENT
Start: 2023-08-23 | End: 2023-08-23 | Stop reason: SDUPTHER

## 2023-08-23 RX ADMIN — Medication 80 MCG: at 13:50

## 2023-08-23 RX ADMIN — Medication 80 MCG: at 14:37

## 2023-08-23 RX ADMIN — SODIUM CHLORIDE, PRESERVATIVE FREE 10 ML: 5 INJECTION INTRAVENOUS at 20:48

## 2023-08-23 RX ADMIN — DEXAMETHASONE SODIUM PHOSPHATE 4 MG: 4 INJECTION, SOLUTION INTRAMUSCULAR; INTRAVENOUS at 13:50

## 2023-08-23 RX ADMIN — PHENYLEPHRINE HYDROCHLORIDE 50 MCG/MIN: 10 INJECTION INTRAVENOUS at 13:39

## 2023-08-23 RX ADMIN — HYDROMORPHONE HYDROCHLORIDE 0.5 MG: 2 INJECTION, SOLUTION INTRAMUSCULAR; INTRAVENOUS; SUBCUTANEOUS at 16:07

## 2023-08-23 RX ADMIN — HYDROMORPHONE HYDROCHLORIDE 1 MG: 1 INJECTION, SOLUTION INTRAMUSCULAR; INTRAVENOUS; SUBCUTANEOUS at 08:12

## 2023-08-23 RX ADMIN — FENTANYL CITRATE 100 MCG: 50 INJECTION, SOLUTION INTRAMUSCULAR; INTRAVENOUS at 13:44

## 2023-08-23 RX ADMIN — ROCURONIUM BROMIDE 10 MG: 10 SOLUTION INTRAVENOUS at 14:08

## 2023-08-23 RX ADMIN — Medication 100 MG: at 13:43

## 2023-08-23 RX ADMIN — ROCURONIUM BROMIDE 5 MG: 10 SOLUTION INTRAVENOUS at 13:43

## 2023-08-23 RX ADMIN — FENTANYL CITRATE 25 MCG: 0.05 INJECTION, SOLUTION INTRAMUSCULAR; INTRAVENOUS at 17:00

## 2023-08-23 RX ADMIN — Medication 2 MG: at 15:29

## 2023-08-23 RX ADMIN — VASOPRESSIN 1 UNITS/MIN: 20 INJECTION INTRAVENOUS at 15:20

## 2023-08-23 RX ADMIN — Medication 5 MG: at 15:14

## 2023-08-23 RX ADMIN — POTASSIUM PHOSPHATE, MONOBASIC POTASSIUM PHOSPHATE, DIBASIC 15 MMOL: 224; 236 INJECTION, SOLUTION, CONCENTRATE INTRAVENOUS at 09:43

## 2023-08-23 RX ADMIN — FENTANYL CITRATE 25 MCG: 0.05 INJECTION, SOLUTION INTRAMUSCULAR; INTRAVENOUS at 16:55

## 2023-08-23 RX ADMIN — I.V. FAT EMULSION 250 ML: 20 EMULSION INTRAVENOUS at 19:13

## 2023-08-23 RX ADMIN — HYDROMORPHONE HYDROCHLORIDE 2 MG: 2 INJECTION, SOLUTION INTRAMUSCULAR; INTRAVENOUS; SUBCUTANEOUS at 23:40

## 2023-08-23 RX ADMIN — GLYCOPYRROLATE 0.4 MG: 0.2 INJECTION INTRAMUSCULAR; INTRAVENOUS at 15:29

## 2023-08-23 RX ADMIN — CEFAZOLIN 2 G: 330 INJECTION, POWDER, FOR SOLUTION INTRAMUSCULAR; INTRAVENOUS at 14:05

## 2023-08-23 RX ADMIN — HYDROMORPHONE HYDROCHLORIDE 1 MG: 1 INJECTION, SOLUTION INTRAMUSCULAR; INTRAVENOUS; SUBCUTANEOUS at 11:21

## 2023-08-23 RX ADMIN — SODIUM CHLORIDE, POTASSIUM CHLORIDE, SODIUM LACTATE AND CALCIUM CHLORIDE: 600; 310; 30; 20 INJECTION, SOLUTION INTRAVENOUS at 13:22

## 2023-08-23 RX ADMIN — ROCURONIUM BROMIDE 15 MG: 10 SOLUTION INTRAVENOUS at 13:55

## 2023-08-23 RX ADMIN — HYDROMORPHONE HYDROCHLORIDE 0.25 MG: 2 INJECTION, SOLUTION INTRAMUSCULAR; INTRAVENOUS; SUBCUTANEOUS at 15:32

## 2023-08-23 RX ADMIN — OXYCODONE HYDROCHLORIDE 10 MG: 5 TABLET ORAL at 20:47

## 2023-08-23 RX ADMIN — PROPOFOL 70 MG: 10 INJECTION, EMULSION INTRAVENOUS at 13:43

## 2023-08-23 RX ADMIN — Medication 80 MCG: at 13:48

## 2023-08-23 RX ADMIN — Medication 120 MCG: at 15:17

## 2023-08-23 RX ADMIN — ACETAMINOPHEN 1000 MG: 500 TABLET ORAL at 20:47

## 2023-08-23 RX ADMIN — ROCURONIUM BROMIDE 5 MG: 10 SOLUTION INTRAVENOUS at 14:50

## 2023-08-23 RX ADMIN — ONDANSETRON HYDROCHLORIDE 4 MG: 2 INJECTION, SOLUTION INTRAMUSCULAR; INTRAVENOUS at 15:32

## 2023-08-23 RX ADMIN — Medication 5 MG: at 14:37

## 2023-08-23 RX ADMIN — MIDODRINE HYDROCHLORIDE 10 MG: 5 TABLET ORAL at 08:11

## 2023-08-23 RX ADMIN — SODIUM CHLORIDE, PRESERVATIVE FREE 10 ML: 5 INJECTION INTRAVENOUS at 08:12

## 2023-08-23 RX ADMIN — HYDROMORPHONE HYDROCHLORIDE 2 MG: 2 INJECTION, SOLUTION INTRAMUSCULAR; INTRAVENOUS; SUBCUTANEOUS at 21:24

## 2023-08-23 RX ADMIN — LIDOCAINE HYDROCHLORIDE 50 MG: 20 INJECTION, SOLUTION EPIDURAL; INFILTRATION; INTRACAUDAL; PERINEURAL at 13:42

## 2023-08-23 RX ADMIN — Medication 200 MCG: at 13:42

## 2023-08-23 RX ADMIN — FAMOTIDINE: 10 INJECTION, SOLUTION INTRAVENOUS at 19:11

## 2023-08-23 RX ADMIN — Medication 120 MCG: at 14:35

## 2023-08-23 RX ADMIN — HYDROMORPHONE HYDROCHLORIDE 2 MG: 2 INJECTION, SOLUTION INTRAMUSCULAR; INTRAVENOUS; SUBCUTANEOUS at 18:16

## 2023-08-23 RX ADMIN — POTASSIUM PHOSPHATE, MONOBASIC POTASSIUM PHOSPHATE, DIBASIC 15 MMOL: 224; 236 INJECTION, SOLUTION, CONCENTRATE INTRAVENOUS at 20:48

## 2023-08-23 RX ADMIN — Medication 5 MG: at 15:17

## 2023-08-23 RX ADMIN — Medication 120 MCG: at 15:19

## 2023-08-23 ASSESSMENT — PAIN DESCRIPTION - LOCATION
LOCATION: ABDOMEN
LOCATION: ARM
LOCATION: ABDOMEN
LOCATION: ARM

## 2023-08-23 ASSESSMENT — PAIN - FUNCTIONAL ASSESSMENT: PAIN_FUNCTIONAL_ASSESSMENT: 0-10

## 2023-08-23 ASSESSMENT — PAIN SCALES - GENERAL
PAINLEVEL_OUTOF10: 7
PAINLEVEL_OUTOF10: 6
PAINLEVEL_OUTOF10: 5
PAINLEVEL_OUTOF10: 10
PAINLEVEL_OUTOF10: 8

## 2023-08-23 ASSESSMENT — PAIN DESCRIPTION - DESCRIPTORS
DESCRIPTORS: ACHING
DESCRIPTORS: ACHING
DESCRIPTORS: DISCOMFORT
DESCRIPTORS: DISCOMFORT
DESCRIPTORS: SHARP

## 2023-08-23 ASSESSMENT — PAIN DESCRIPTION - ORIENTATION
ORIENTATION: LEFT
ORIENTATION: LEFT
ORIENTATION: ANTERIOR

## 2023-08-23 NOTE — PROGRESS NOTES
Bedside and Verbal shift change report given to April RN (oncoming nurse) by Rita Benitez RN (offgoing nurse). Report included the following information Nurse Handoff Report, Index, ED SBAR, Intake/Output, MAR, Recent Results, and Cardiac Rhythm Sinus Brennon/NSR . Patient was instructed to keep NPO after midnight and verbalized understanding. Assisted to the bathroom twice last night and opened his bowel. Noticed still spitting/suctioning a lot of secretions orally; canister once changed. AM labs taken. Report given to the AM nurse.

## 2023-08-23 NOTE — H&P
Date of Surgery Update:  Regina Perry was seen and examined. History and physical has been reviewed. The patient has been examined. There have been no significant clinical changes since the completion of the originally dated History and Physical.    Signed By: Kaelyn Gillespie MD     August 23, 2023 1:18 PM         Please note from the office and include the additional information below:    Past Medical History  Past Medical History:   Diagnosis Date    BPH (benign prostatic hyperplasia)     Cannabis dependence in remission ([address])     Cocaine dependence in remission [facility])     Depression     Erectile dysfunction     GERD (gastroesophageal reflux disease)     Hepatitis C virus     HTN (hypertension)     Lung cancer ([address])     Polysubstance abuse ([address])     Primary open angle glaucoma     Prostate cancer ([address])     Unilateral primary osteoarthritis, right knee         Past Surgical History  Past Surgical History:   Procedure Laterality Date    UPPER GASTROINTESTINAL ENDOSCOPY N/A 8/15/2023    EGD ESOPHAGOGASTRODUODENOSCOPY performed by Doris Álvarez MD at [facility] ENDOSCOPY        Social History  The patient Regina Perry  reports that he has quit smoking. His smoking use included cigarettes. He has never used smokeless tobacco.     Family History  History reviewed. No pertinent family history.

## 2023-08-23 NOTE — PROGRESS NOTES
TRANSFER - OUT REPORT:    Verbal report given to Halie Graff RN on [employer] Client  being transferred to [facility] for ordered procedure       Report consisted of patient's Situation, Background, Assessment and   Recommendations(SBAR). Information from the following report(s) Nurse Handoff Report, Index, Adult Overview, Intake/Output, MAR, Recent Results, Cardiac Rhythm Sinus braulio-NSR, and Pre Procedure Checklist was reviewed with the receiving nurse. Lines:   CVC Triple Lumen 08/15/23 (Active)   $ Central line insertion $ Yes 08/15/23 2107   Central Line Being Utilized Yes 08/22/23 2000   Criteria for Appropriate Use Long term IV/antibiotic administration 08/22/23 2000   Site Assessment Clean, dry & intact 08/22/23 2000   Phlebitis Assessment No symptoms 08/22/23 2000   Infiltration Assessment 0 08/22/23 2000   Color/Movement/Sensation Capillary refill less than 3 sec 08/22/23 2000   Proximal Lumen Color/Status White; Infusing 08/22/23 2000   Medial Lumen Status Flushed; Infusing 08/22/23 2000   Distal Lumen Color/Status Brown;Flushed;Capped 08/22/23 2000   Line Care Connections checked and tightened 08/22/23 2000   Alcohol Cap Used Yes 08/22/23 2000   Date of Last Dressing Change 08/22/23 08/22/23 1355   Dressing Type Transparent w/CHG gel 08/22/23 2000   Dressing Status New dressing applied 08/22/23 2000   Dressing Intervention New 08/22/23 2000       Peripheral IV 08/15/23 Right Forearm (Active)   Site Assessment Clean, dry & intact 08/22/23 2000   Line Status Capped;Flushed 08/22/23 [facility] changed 08/22/23 2000   Phlebitis Assessment No symptoms 08/22/23 2000   Infiltration Assessment 0 08/22/23 2000   Alcohol Cap Used Yes 08/22/23 2000   Dressing Status Clean, dry & intact 08/22/23 2000   Dressing Type Transparent 08/22/23 2000        Opportunity for questions and clarification was provided.       Patient transported with:  [employer]

## 2023-08-23 NOTE — PROGRESS NOTES
Hospitalist Progress Note  Giuliano Powers MD  Answering service: [phone]        Date of Service:  2023  NAME:  Iona Adame  :  1955  MRN:  [id number]      Admission Summary:     Patient transferred from [facility] with gastric outlet obstruction and gastric emphysema. Interval history / Subjective:     Patient has no acute complaint. Assessment & Plan:     Gastric emphysema  -Patient presented with abdominal pain, distention, inability to tolerate p.o. and significant weight loss  -Initial CT abdomen and pelvis shows massive gastric distention with findings suspicious for gastric wall emphysema  -Follow-up upper GI series with mildly dilated stomach, no barium seen passing through the pylorus  -S/p EGD shows friable mass in the duodenum, s/p biopsy, pathology negative for malignancy  -GI and general surgery following, also palliative care following  -Continuing TPN, plan for exploratory laparotomy and gastrojejunostomy today    Hypotension  -No other clinical signs or symptoms of sepsis, blood pressure improving    Sinus pauses  -Cardiology evaluated the patient and no cardiac work-up or intervention was recommended    Hypernatremia  -Resolved    History of lung cancer  -Patient follows with oncology at [facility]    Anemia  -This is likely anemia of chronic disease given patient's history of malignancy  -Monitor H&H    BPH  -Holding p.o. meds as patient cannot tolerate p.o. intake    Severe protein calorie malnutrition  -Continue TPN, nutrition following     Code status: Full  Prophylaxis: SCDs  Care Plan discussed with: Patient and care team.  Anticipated Disposition: Likely more than 48 hours             Review of Systems:   Pertinent items are noted in HPI. Vital Signs:    Last 24hrs VS reviewed since prior progress note.  Most recent are:  Vitals:    23 1310 IntraVENous 2 times per day    sodium chloride flush 0.9 % injection 5-40 mL  5-40 mL IntraVENous PRN    polyethylene glycol (GLYCOLAX) packet 17 g  17 g Oral Daily PRN    naloxone (NARCAN) injection 0.4 mg  0.4 mg IntraVENous PRN    HYDROmorphone HCl PF (DILAUDID) injection 1 mg  1 mg IntraVENous Q3H PRN     Facility-Administered Medications Ordered in Other Encounters   Medication Dose Route Frequency    propofol infusion   IntraVENous PRN    rocuronium (ZEMURON) injection   IntraVENous PRN    succinylcholine (ANECTINE) injection   IntraVENous PRN    phenylephrine (DALIA-SYNEPHRINE) 50 mg in sodium chloride 0.9 % 250 mL infusion   IntraVENous Continuous PRN    phenylephrine (DALIA-SYNEPHRINE) 40 mcg/mL in 0.9% sodium chloride injection   IntraVENous PRN    lidocaine PF 2 % injection   IntraVENous PRN    fentaNYL (SUBLIMAZE) injection   IntraVENous PRN     ______________________________________________________________________  EXPECTED LENGTH OF STAY: 3  ACTUAL LENGTH OF STAY:          13                 Aaron Garzon MD

## 2023-08-23 NOTE — ANESTHESIA POSTPROCEDURE EVALUATION
Department of Anesthesiology  Postprocedure Note    Patient: Rosalino Ybarra  MRN: [id number]  YOB: 1955  Date of evaluation: 8/23/2023      Procedure Summary     Date: 08/23/23 Room / Location: [facility] MAIN OR [address] [place] [address] MAIN OR    Anesthesia Start: 1332 Anesthesia Stop:     Procedure: EXPLORATORY LAPAROTOMY GASTROJEJUNOSTOMY (Abdomen) Diagnosis:       Encounter for swallowing study      (Encounter for swallowing study [Z13.810])    Providers: Adrianne Nagel MD Responsible Provider: Jhonny Hogan MD    Anesthesia Type: general ASA Status: 3          Anesthesia Type: No value filed.     Dania Phase I: Dania Score: 10    Dania Phase II: Dania Score: 10      Anesthesia Post Evaluation    Patient location during evaluation: PACU  Patient participation: complete - patient participated  Level of consciousness: awake  Pain score: 2  Airway patency: patent  Nausea & Vomiting: no nausea  Complications: no  Cardiovascular status: blood pressure returned to baseline  Respiratory status: acceptable  Hydration status: euvolemic  Pain management: adequate

## 2023-08-23 NOTE — PROGRESS NOTES
NUTRITION brief    Recommendations: For TPN on 8/23:  -Increase AA to 100 gm  -increase dex to 250 gm  -consider maximizing acetate salts    Diet advancement per surgery post-op    Would continue TPN at goal until tolerating >50% of meals beyond clear liquids       Diet: NPO  Nutrition Support: TPN @ 75 mL/hr with 85 gm AA, 200 gm dex + 250 mL 20% lipids daily   Nutrition-related meds: midodrine, K Phos    New events impacting nutrition plan of care:     8/23: Phos WNL! Plan for OR today. Recommend TPN changes as above.      8/22: appreciate coordination and clarification from palliative care. Pt is to have a ex lap with gastrojejunostomy on 8/23/2023. This is not intended to be a feeding tube and goal is that patient will be able to eat post-op. For now, continues on TPN and already reordered today. Phos better, but still low. Repletion ordered. Current TPN + lipids providing 1520 kcal (28-30 kcal/kg), 85 gm pro (1.5-1.7 gm/kg) meeting 76% of kcal and 85% of his EEN (calculated for weight gain and his hypercatabolic state) respectively. Pt has been on TPN for 1 week now. I would still wait until Phos has improved more to advance to goal.  Would consider increasing AA to goal.  If able to increase dex to 250 gm and AA to 100 gm, this will provide total of 1750 kcal.    See full RD assessment from 8/21 for additional details, goals, and monitoring/evaluation.    Estimated Nutrition Needs:   Energy Requirements Based On: Kcal/kg  Weight Used for Energy Requirements: Admission (50 kg)  Energy (kcal/day): 2000 (40+ kcal/kg)  Weight Used for Protein Requirements: Admission  Protein (g/day): 100 (2 gm/kg)     Fluid (ml/day): 1 mL/kcal    Recent Labs     08/21/23  0504 08/22/23  0715 08/23/23  0313   GLUCOSE 109* 107* 116*   BUN 21* 18 21*   CREATININE 0.40* 0.35* 0.35*    143 145   K HEMOLYZED SPECIMEN 3.6 HEMOLYZED SPECIMEN   * 110* 113*   CO2 26 30 27   CALCIUM 8.3* 8.5 8.4*   PHOS 1.3* 2.1* 3.7   MG 2.1 2.3  --            Recent Labs     08/20/23  1141 08/20/23  1753 08/21/23  0817 08/21/23  1129 08/21/23  1536 08/22/23  0007 08/22/23  0751 08/22/23  1124 08/22/23  1750 08/22/23  2359 08/23/23  0559   POCGLU 100 110 93 91 93 91 114 98 88 108 99         Ruth Keita RD  Available via [employer]

## 2023-08-23 NOTE — BRIEF OP NOTE
Brief Postoperative Note      Patient: Josie Garza  YOB: 1955  MRN: [id number]    Date of Procedure: 8/23/2023    Pre-Op Diagnosis: Gastric Outlet Obstruction. Post-Op Diagnosis:  Same. Procedure:   Exploratory Laparotomy. Gastrojejunostomy. Biopsy Duodenal Serosal Implant. Surgeon(s):  Brina Shelton MD    Assistant: Maxi Cancino SA    Anesthesia: General    Estimated Blood Loss (mL): Approximately 10 ml. Complications: None    Specimens:   ID Type Source Tests Collected by Time Destination   A : Duodenal Serosal implant  Tissue Duodenum SURGICAL PATHOLOGY Brina Shelton MD 8/23/2023 1425    B : Dudenal Serosal Implant  Tissue Duodenum SURGICAL PATHOLOGY Brina Shelton MD 8/23/2023 1508        Implants:  * No implants in log *      Drains:   Urinary Catheter 08/23/23 Augustine (Active)       [REMOVED] NG/OG/NJ/NE Tube Nasogastric 16 fr Right nostril (Removed)   Surrounding Skin Clean, dry & intact 08/13/23 2040   Securement device Adhesive based guillory 08/13/23 2040   Status Suction-low intermittent 08/13/23 2040   Placement Verified X-Ray (Initial) 08/13/23 2040   NG/OG/NJ/NE External Measurement (cm) 64 cm 08/13/23 2040   Drainage Appearance Clear 08/13/23 2040   Output (mL) 350 ml 08/13/23 1905   Action Taken Placement verified (comment) 08/13/23 0815       Findings: Massively dilated stomach. Obstructing mass in the duodenum. Serosal implants on duodenum which were biopsied. Collapsed small bowel. 2 layer hand sewn gastrojejunostomy.        Electronically signed by Brina Shelton MD on 8/23/2023 at 3:50 PM

## 2023-08-23 NOTE — PLAN OF CARE
Problem: Discharge Planning  Goal: Discharge to home or other facility with appropriate resources  8/23/2023 0144 by [facility], RN  Outcome: Progressing    Problem: Skin/Tissue Integrity  Goal: Absence of new skin breakdown  Description: 1. Monitor for areas of redness and/or skin breakdown  2. Assess vascular access sites hourly  3. Every 4-6 hours minimum:  Change oxygen saturation probe site  4. Every 4-6 hours:  If on nasal continuous positive airway pressure, respiratory therapy assess nares and determine need for appliance change or resting period.   8/23/2023 0144 by [facility], RN  Outcome: Progressing    Problem: Safety - Adult  Goal: Free from fall injury  8/23/2023 0144 by [facility], RN  Outcome: Progressing  Flowsheets (Taken 8/23/2023 0144)  Free From Fall Injury:   Instruct family/caregiver on patient safety   Based on caregiver fall risk screen, instruct family/caregiver to ask for assistance with transferring infant if caregiver noted to have fall risk factors

## 2023-08-24 ENCOUNTER — APPOINTMENT (OUTPATIENT)
Facility: HOSPITAL | Age: 68
DRG: 326 | End: 2023-08-24
Attending: STUDENT IN AN ORGANIZED HEALTH CARE EDUCATION/TRAINING PROGRAM
Payer: MEDICARE

## 2023-08-24 PROBLEM — C80.1 CARCINOMA (HCC): Status: ACTIVE | Noted: 2023-08-24

## 2023-08-24 PROBLEM — D61.818 PANCYTOPENIA (HCC): Status: ACTIVE | Noted: 2023-08-24

## 2023-08-24 LAB
ANION GAP SERPL CALC-SCNC: 6 MMOL/L (ref 5–15)
BASOPHILS # BLD: 0 K/UL (ref 0–0.1)
BASOPHILS NFR BLD: 0 % (ref 0–1)
BUN SERPL-MCNC: 24 MG/DL (ref 6–20)
BUN/CREAT SERPL: 59 (ref 12–20)
CALCIUM SERPL-MCNC: 8.3 MG/DL (ref 8.5–10.1)
CHLORIDE SERPL-SCNC: 111 MMOL/L (ref 97–108)
CO2 SERPL-SCNC: 26 MMOL/L (ref 21–32)
CREAT SERPL-MCNC: 0.41 MG/DL (ref 0.7–1.3)
DIFFERENTIAL METHOD BLD: ABNORMAL
EOSINOPHIL # BLD: 0 K/UL (ref 0–0.4)
EOSINOPHIL NFR BLD: 0 % (ref 0–7)
ERYTHROCYTE [DISTWIDTH] IN BLOOD BY AUTOMATED COUNT: 14.5 % (ref 11.5–14.5)
GLUCOSE BLD STRIP.AUTO-MCNC: 100 MG/DL (ref 65–117)
GLUCOSE BLD STRIP.AUTO-MCNC: 120 MG/DL (ref 65–117)
GLUCOSE BLD STRIP.AUTO-MCNC: 135 MG/DL (ref 65–117)
GLUCOSE BLD STRIP.AUTO-MCNC: 164 MG/DL (ref 65–117)
GLUCOSE BLD STRIP.AUTO-MCNC: 92 MG/DL (ref 65–117)
GLUCOSE SERPL-MCNC: 135 MG/DL (ref 65–100)
HCT VFR BLD AUTO: 25.2 % (ref 36.6–50.3)
HGB BLD-MCNC: 8.4 G/DL (ref 12.1–17)
IMM GRANULOCYTES # BLD AUTO: 0 K/UL
IMM GRANULOCYTES NFR BLD AUTO: 0 %
LYMPHOCYTES # BLD: 0.4 K/UL (ref 0.8–3.5)
LYMPHOCYTES NFR BLD: 23 % (ref 12–49)
MAGNESIUM SERPL-MCNC: 2 MG/DL (ref 1.6–2.4)
MCH RBC QN AUTO: 35.4 PG (ref 26–34)
MCHC RBC AUTO-ENTMCNC: 33.3 G/DL (ref 30–36.5)
MCV RBC AUTO: 106.3 FL (ref 80–99)
MONOCYTES # BLD: 0.2 K/UL (ref 0–1)
MONOCYTES NFR BLD: 12 % (ref 5–13)
NEUTS SEG # BLD: 1 K/UL (ref 1.8–8)
NEUTS SEG NFR BLD: 65 % (ref 32–75)
NRBC # BLD: 0 K/UL (ref 0–0.01)
NRBC BLD-RTO: 0 PER 100 WBC
PHOSPHATE SERPL-MCNC: 2.8 MG/DL (ref 2.6–4.7)
PLATELET # BLD AUTO: 80 K/UL (ref 150–400)
PMV BLD AUTO: 12.3 FL (ref 8.9–12.9)
POTASSIUM SERPL-SCNC: 4.6 MMOL/L (ref 3.5–5.1)
RBC # BLD AUTO: 2.37 M/UL (ref 4.1–5.7)
RBC MORPH BLD: ABNORMAL
RBC MORPH BLD: ABNORMAL
SERVICE CMNT-IMP: ABNORMAL
SERVICE CMNT-IMP: NORMAL
SERVICE CMNT-IMP: NORMAL
SODIUM SERPL-SCNC: 143 MMOL/L (ref 136–145)
WBC # BLD AUTO: 1.6 K/UL (ref 4.1–11.1)

## 2023-08-24 PROCEDURE — 2580000003 HC RX 258: Performed by: SURGERY

## 2023-08-24 PROCEDURE — 99024 POSTOP FOLLOW-UP VISIT: CPT | Performed by: SURGERY

## 2023-08-24 PROCEDURE — 6360000002 HC RX W HCPCS: Performed by: SURGERY

## 2023-08-24 PROCEDURE — 51798 US URINE CAPACITY MEASURE: CPT

## 2023-08-24 PROCEDURE — 82962 GLUCOSE BLOOD TEST: CPT

## 2023-08-24 PROCEDURE — 2060000000 HC ICU INTERMEDIATE R&B

## 2023-08-24 PROCEDURE — 2500000003 HC RX 250 WO HCPCS: Performed by: SURGERY

## 2023-08-24 PROCEDURE — 80048 BASIC METABOLIC PNL TOTAL CA: CPT

## 2023-08-24 PROCEDURE — 36415 COLL VENOUS BLD VENIPUNCTURE: CPT

## 2023-08-24 PROCEDURE — 70450 CT HEAD/BRAIN W/O DYE: CPT

## 2023-08-24 PROCEDURE — 51701 INSERT BLADDER CATHETER: CPT

## 2023-08-24 PROCEDURE — 6370000000 HC RX 637 (ALT 250 FOR IP): Performed by: SURGERY

## 2023-08-24 PROCEDURE — 84100 ASSAY OF PHOSPHORUS: CPT

## 2023-08-24 PROCEDURE — 85025 COMPLETE CBC W/AUTO DIFF WBC: CPT

## 2023-08-24 PROCEDURE — 83735 ASSAY OF MAGNESIUM: CPT

## 2023-08-24 PROCEDURE — 99222 1ST HOSP IP/OBS MODERATE 55: CPT | Performed by: INTERNAL MEDICINE

## 2023-08-24 PROCEDURE — 6370000000 HC RX 637 (ALT 250 FOR IP): Performed by: INTERNAL MEDICINE

## 2023-08-24 RX ORDER — TAMSULOSIN HYDROCHLORIDE 0.4 MG/1
0.4 CAPSULE ORAL DAILY
Status: DISCONTINUED | OUTPATIENT
Start: 2023-08-24 | End: 2023-08-24

## 2023-08-24 RX ORDER — TAMSULOSIN HYDROCHLORIDE 0.4 MG/1
0.4 CAPSULE ORAL 2 TIMES DAILY
Status: DISCONTINUED | OUTPATIENT
Start: 2023-08-24 | End: 2023-08-28 | Stop reason: HOSPADM

## 2023-08-24 RX ORDER — LIDOCAINE HYDROCHLORIDE 20 MG/ML
JELLY TOPICAL PRN
Status: DISCONTINUED | OUTPATIENT
Start: 2023-08-24 | End: 2023-08-28 | Stop reason: HOSPADM

## 2023-08-24 RX ADMIN — POTASSIUM PHOSPHATE, MONOBASIC POTASSIUM PHOSPHATE, DIBASIC 15 MMOL: 224; 236 INJECTION, SOLUTION, CONCENTRATE INTRAVENOUS at 21:10

## 2023-08-24 RX ADMIN — SODIUM CHLORIDE, PRESERVATIVE FREE 10 ML: 5 INJECTION INTRAVENOUS at 08:23

## 2023-08-24 RX ADMIN — ACETAMINOPHEN 1000 MG: 500 TABLET ORAL at 18:30

## 2023-08-24 RX ADMIN — POTASSIUM PHOSPHATE, MONOBASIC POTASSIUM PHOSPHATE, DIBASIC 15 MMOL: 224; 236 INJECTION, SOLUTION, CONCENTRATE INTRAVENOUS at 09:58

## 2023-08-24 RX ADMIN — TAMSULOSIN HYDROCHLORIDE 0.4 MG: 0.4 CAPSULE ORAL at 11:49

## 2023-08-24 RX ADMIN — HYDROMORPHONE HYDROCHLORIDE 2 MG: 2 INJECTION, SOLUTION INTRAMUSCULAR; INTRAVENOUS; SUBCUTANEOUS at 04:18

## 2023-08-24 RX ADMIN — FAMOTIDINE: 10 INJECTION, SOLUTION INTRAVENOUS at 18:32

## 2023-08-24 RX ADMIN — I.V. FAT EMULSION 250 ML: 20 EMULSION INTRAVENOUS at 18:33

## 2023-08-24 RX ADMIN — LIDOCAINE HYDROCHLORIDE: 20 JELLY TOPICAL at 17:32

## 2023-08-24 RX ADMIN — OXYCODONE HYDROCHLORIDE 5 MG: 5 TABLET ORAL at 18:38

## 2023-08-24 RX ADMIN — ACETAMINOPHEN 1000 MG: 500 TABLET ORAL at 11:49

## 2023-08-24 RX ADMIN — SODIUM CHLORIDE, PRESERVATIVE FREE 10 ML: 5 INJECTION INTRAVENOUS at 21:12

## 2023-08-24 RX ADMIN — OXYCODONE HYDROCHLORIDE 10 MG: 5 TABLET ORAL at 00:38

## 2023-08-24 RX ADMIN — OXYCODONE HYDROCHLORIDE 10 MG: 5 TABLET ORAL at 06:24

## 2023-08-24 ASSESSMENT — PAIN DESCRIPTION - ORIENTATION
ORIENTATION: MID
ORIENTATION: MID;LOWER

## 2023-08-24 ASSESSMENT — PAIN SCALES - GENERAL
PAINLEVEL_OUTOF10: 8
PAINLEVEL_OUTOF10: 8
PAINLEVEL_OUTOF10: 10
PAINLEVEL_OUTOF10: 0

## 2023-08-24 ASSESSMENT — PAIN DESCRIPTION - LOCATION
LOCATION: ABDOMEN

## 2023-08-24 ASSESSMENT — PAIN DESCRIPTION - DESCRIPTORS: DESCRIPTORS: THROBBING;PRESSURE

## 2023-08-24 NOTE — PROGRESS NOTES
End of Shift Note    Bedside shift change report given to Armando Weaver RN (oncoming nurse) by Keiry Ascencio RN (offgoing nurse). Report included the following information SBAR, Kardex, OR Summary, Procedure Summary, Intake/Output, and MAR    Shift summary and any significant changes:    Received patient from off going nurse April, patient c/o lots pain from abd surgery, he was medicated 3x on my shift, he previously pulled out NGT, April and I both attempted several times to replace tube but keeps refusing. Plan of care Continue to monitor for increase confusion s/p abd surgery.        Activity:  Bedrest     Cardiac:   Cardiac Monitoring: NSR      Access:  Current line(s): CVL      Genitourinary:   Urinary status: CHAPA      Respiratory:   O2 use?: RA      GI:  SEE FLOW SHEET      Skin:  Interventions:   See flow sheet        Patient Safety:  Fall Interventions:   Standard fall precaution in place        Keiry Ascencio RN

## 2023-08-24 NOTE — PROGRESS NOTES
Hospitalist Progress Note  Marine Wheat MD  Answering service: [phone]        Date of Service:  2023  NAME:  Magdalena Villa  :  1955  MRN:  [id number]      Admission Summary:     Patient transferred from [facility] with gastric outlet obstruction and gastric emphysema.     Interval history / Subjective:     S/p OR yesterday  Restless, says he has to urinate (though hernandez in place)  Some confusion this AM  AFVSS     Assessment & Plan:     Gastric emphysema  -Patient presented with abdominal pain, distention, inability to tolerate p.o. and significant weight loss  -Initial CT abdomen and pelvis shows massive gastric distention with findings suspicious for gastric wall emphysema  -Follow-up upper GI series with mildly dilated stomach, no barium seen passing through the pylorus  -S/p EGD shows friable mass in the duodenum, s/p biopsy, pathology negative for malignancy  -GI and general surgery following, also palliative care following  -Continuing TPN, remain NPO except ice chips and meds  -s/p exploratory laparotomy and gastrojejunostomy     Acute metabolic encephalopathy 3/20  Nonfocal, likely r/t postop, anesthesia  CT head nothing acute    Hypotension, resolved  -unclear etiology, no other signs or symptoms of sepsis    Sinus pauses  -Cardiology evaluated the patient and no cardiac work-up or intervention was recommended    Pancytopenia, worsening  -likely anemia of chronic disease given patient's history of malignancy  -Monitor hgb postop  -worse neutropenia will ask heme/onc to see, appreciate assistance  -B12 wnl/high    H/o prostate ca  History of lung cancer  -Patient follows with oncology at [facility]    Hypernatremia  -Resolved    BPH  -resume home Flomax    Severe protein calorie malnutrition  -Continue TPN, nutrition following       Code status: Full  Prophylaxis: SCDs  Care input(s): CPKMB, CKNDX, TROIQ  No results found for: CHOL, CHOLX, CHLST, CHOLV, HDL, HDLC, LDL, LDLC, TGLX, TRIGL  No results found for: GLUCPOC      Medications Reviewed:     Current Facility-Administered Medications   Medication Dose Route Frequency    PN-Adult 2-in-1 Central Line (Standard)   IntraVENous Continuous TPN    PN-Adult 2-in-1 Central Line (Standard)   IntraVENous Continuous TPN    HYDROmorphone HCl PF (DILAUDID) injection 2 mg  2 mg IntraVENous Q3H PRN    acetaminophen (TYLENOL) tablet 1,000 mg  1,000 mg Oral 4 times per day    oxyCODONE (ROXICODONE) immediate release tablet 5 mg  5 mg Oral Q4H PRN    oxyCODONE (ROXICODONE) immediate release tablet 10 mg  10 mg Oral Q4H PRN    potassium phosphate 15 mmol in sodium chloride 0.9 % 250 mL IVPB  15 mmol IntraVENous BID    fat emulsion (INTRALIPID/NUTRILIPID) 20 % infusion 250 mL  250 mL IntraVENous Q24H    glucose chewable tablet 16 g  4 tablet Oral PRN    dextrose bolus 10% 125 mL  125 mL IntraVENous PRN    Or    dextrose bolus 10% 250 mL  250 mL IntraVENous PRN    glucagon injection 1 mg  1 mg SubCUTAneous PRN    dextrose 10 % infusion   IntraVENous Continuous PRN    sodium chloride flush 0.9 % injection 5-40 mL  5-40 mL IntraVENous 2 times per day    0.9 % sodium chloride infusion   IntraVENous PRN    ondansetron (ZOFRAN-ODT) disintegrating tablet 4 mg  4 mg Oral Q6H PRN    sodium chloride flush 0.9 % injection 5-40 mL  5-40 mL IntraVENous 2 times per day    sodium chloride flush 0.9 % injection 5-40 mL  5-40 mL IntraVENous PRN    naloxone (NARCAN) injection 0.4 mg  0.4 mg IntraVENous PRN     ______________________________________________________________________  EXPECTED LENGTH OF STAY: 3  ACTUAL LENGTH OF STAY:          14                 Kayy Black MD

## 2023-08-24 NOTE — PLAN OF CARE
Problem: Skin/Tissue Integrity  Goal: Absence of new skin breakdown  Description: 1. Monitor for areas of redness and/or skin breakdown  2. Assess vascular access sites hourly  3. Every 4-6 hours minimum:  Change oxygen saturation probe site  4. Every 4-6 hours:  If on nasal continuous positive airway pressure, respiratory therapy assess nares and determine need for appliance change or resting period.   8/24/2023 1016 by Kellen Montoya RN  Outcome: Progressing  8/23/2023 2247 by Ida Miller RN  Outcome: Progressing     Problem: Safety - Adult  Goal: Free from fall injury  8/24/2023 1016 by Kellen Montoya RN  Outcome: Progressing  8/23/2023 2247 by Ida Miller RN  Outcome: Progressing

## 2023-08-24 NOTE — PROGRESS NOTES
NUTRITION brief    Recommendations:     Continue TPN @ 75 mL/hr with 100 gm AA, 250 gm dex + 250 mL 20% lipids daily  If Phos remains stable and pt remains NPO, recommend increasing dex to 300 gm (goal)    Diet advancement per surgery    Would continue TPN at goal until tolerating >50% of meals beyond clear liquids       Diet: NPO  Nutrition Support: TPN @ 75 mL/hr with 100 gm AA, 250 gm dex + 250 mL 20% lipids daily   Nutrition-related meds: K Phos    New events impacting nutrition plan of care:       8/24: pt s/p gastrojejunostomy yesterday and now with acute metcolic encephalopathy, likely r/t post-op. CT head negative. NG tube out. Remains NPO. TPN continues as recommended. Labs look good. May consider increased dex to goal if Phos remains stable    8/23: Phos WNL! Plan for OR today. Recommend TPN changes as above.      8/22: appreciate coordination and clarification from palliative care. Pt is to have a ex lap with gastrojejunostomy on 8/23/2023. This is not intended to be a feeding tube and goal is that patient will be able to eat post-op. For now, continues on TPN and already reordered today. Phos better, but still low. Repletion ordered. Current TPN + lipids providing 1520 kcal (28-30 kcal/kg), 85 gm pro (1.5-1.7 gm/kg) meeting 76% of kcal and 85% of his EEN (calculated for weight gain and his hypercatabolic state) respectively. Pt has been on TPN for 1 week now. I would still wait until Phos has improved more to advance to goal.  Would consider increasing AA to goal.  If able to increase dex to 250 gm and AA to 100 gm, this will provide total of 1750 kcal.    See full RD assessment from 8/21 for additional details, goals, and monitoring/evaluation.    Estimated Nutrition Needs:   Energy Requirements Based On: Kcal/kg  Weight Used for Energy Requirements: Admission (50 kg)  Energy (kcal/day): 2000 (40+ kcal/kg)  Weight Used for Protein Requirements: Admission  Protein (g/day): 100 (2

## 2023-08-24 NOTE — PROGRESS NOTES
Mr. Kj Greenwood is confused this AM. NG tube is out. Tm 98.9 Tc 98.4 HR: 102 BP: 118/87 Resp Rate: 22 92% sat on room air. Intake/Output Summary (Last 24 hours) at 8/24/2023 0744  Last data filed at 8/23/2023 1705  Gross per 24 hour   Intake 1000 ml   Output 435 ml   Net 565 ml   Exam: Cor: RRR. Lungs: Bilateral breath sounds. Abd: Soft. Non distended. Tender. Incision is clean.    Labs:   Recent Results (from the past 12 hour(s))   POCT Glucose    Collection Time: 08/24/23 12:13 AM   Result Value Ref Range    POC Glucose 135 (H) 65 - 117 mg/dL    Performed by: Remedios Julian    Basic Metabolic Panel    Collection Time: 08/24/23  4:25 AM   Result Value Ref Range    Sodium 143 136 - 145 mmol/L    Potassium 4.6 3.5 - 5.1 mmol/L    Chloride 111 (H) 97 - 108 mmol/L    CO2 26 21 - 32 mmol/L    Anion Gap 6 5 - 15 mmol/L    Glucose 135 (H) 65 - 100 mg/dL    BUN 24 (H) 6 - 20 MG/DL    Creatinine 0.41 (L) 0.70 - 1.30 MG/DL    Bun/Cre Ratio 59 (H) 12 - 20      Est, Glom Filt Rate >60 >60 ml/min/1.73m2    Calcium 8.3 (L) 8.5 - 10.1 MG/DL   Magnesium    Collection Time: 08/24/23  4:25 AM   Result Value Ref Range    Magnesium 2.0 1.6 - 2.4 mg/dL   Phosphorus    Collection Time: 08/24/23  4:25 AM   Result Value Ref Range    Phosphorus 2.8 2.6 - 4.7 MG/DL   CBC with Auto Differential    Collection Time: 08/24/23  4:25 AM   Result Value Ref Range    WBC 1.6 (L) 4.1 - 11.1 K/uL    RBC 2.37 (L) 4.10 - 5.70 M/uL    Hemoglobin 8.4 (L) 12.1 - 17.0 g/dL    Hematocrit 25.2 (L) 36.6 - 50.3 %    .3 (H) 80.0 - 99.0 FL    MCH 35.4 (H) 26.0 - 34.0 PG    MCHC 33.3 30.0 - 36.5 g/dL    RDW 14.5 11.5 - 14.5 %    Platelets 80 (L) 671 - 400 K/uL    MPV 12.3 8.9 - 12.9 FL    Nucleated RBCs 0.0 0  WBC    nRBC 0.00 0.00 - 0.01 K/uL    Neutrophils % 65 32 - 75 %    Lymphocytes % 23 12 - 49 %    Monocytes % 12 5 - 13 %    Eosinophils % 0 0 - 7 %    Basophils % 0 0 - 1 %    Immature

## 2023-08-24 NOTE — OP NOTE
[address]  [phone]           Endoscopic Gastroduodenoscopy Procedure Note    Magdalena Villa  1955  [id number]    Indication: Gastric outlet obstruction    : Yajaira Mancini MD    Staff: Circulator: Adriana Schaefer RN     Referring Provider:  None None    Anesthesia/Sedation:  MAC anesthesia      Procedure Details     After infomed consent was obtained for the procedure, with all risks and benefits of procedure explained the patient was taken to the endoscopy suite and placed in the left lateral decubitus position. Following sequential administration of sedation as per above, the endoscope was inserted into the mouth and advanced under direct vision to duodenal bulb. A careful inspection was made as the gastroscope was withdrawn, including a retroflexed view of the proximal stomach; findings and interventions are described below. Findings:   Esophagus:normal  Stomach: Large amount of fluid and barium along with some solid food. Suctioned about 700 ml. Could not clear everything. The stomach was noted to be quite distended with U shape requiring a large loop to get to the pylorus and difficulty staying intubated in the bulb. Duodenum/jejunum: Unable to traverse beyond the bulb with evidence of possible friable mass with oozing on contact. Biopsies performed. Therapies:  biopsy of duodenal bulb mass    Specimens:   ID Type Source Tests Collected by Time Destination   A : Duodenal mass bx Tissue Duodenum SURGICAL PATHOLOGY Yajaira Mancini MD 8/15/2023 1340                EBL: Minimal    Complications:  None; patient tolerated the procedure well. Impression:      -Very distended stomach with u shaped entry to pylorus.   -Possible friable mass in the duodenal bulb with obstruction. Biopsies obtained. -Unable to traverse and based on exam would not be able to place a stent.      Recommendations:  -Return patient to
with ChloraPrep and draped as a sterile field. Local anesthetic was infiltrated and a midline incision above the umbilicus was opened sharply. Subcutaneous bleeders were carefully cauterized. The incision was carried down to the fascia which was divided in the midline. The peritoneal cavity was carefully entered, and upon doing so, no ascites was encountered. The stomach was readily identified and was found to be massively dilated. In order to improve exposure, the incision was extended inferiorly to the left of the umbilicus. Following this, the stomach was then carefully mobilized and delivered into the field. At the duodenum, an obstructing mass was appreciated. Furthermore, serosal implants were noted. A serosal implant was biopsied and submitted for frozen section evaluation. The gastrocolic ligament was divided with the Harmonic scalpel and the lesser sac was entered. The posterior wall of the stomach was exposed in order to allow for completion of the gastrojejunostomy. Attention was then directed towards the small bowel. The ligament of Treitz was identified and the small bowel was run distally. It should be noted that the small bowel was collapsed. Clinically, this was felt to be consistent with a gastric outlet obstruction. A loop of small bowel was then brought to the posterior wall of the stomach under no tension. The posterior layer of anastomosis was completed with interrupted 2-0 silk seromuscular sutures. A gastrotomy and enterotomy were opened with cautery and the inner layer of anastomosis was completed with running locked 2-0 PDS suture. The outer layer of anastomosis was completed with interrupted 2-0 silk seromuscular sutures. At completion, the anastomosis was felt to be intact, widely patent, under no tension. At this point, frozen section of the serosal implant returned carcinoma. Permanent sections are pending.   A second serosal implant was biopsied and this was passed

## 2023-08-24 NOTE — PROGRESS NOTES
Entered patients room for rounding and his NG tube was lying on the bed. Patient is restless and moaning out in pain. Attempted to replace NG tube and the patient could not tolerate it at this time, asked to try later. Educated the patient on the importance of replacing the NG tube but he politely declined at this time, will inform oncoming nurse. Bedside shift change report given to Amy Marrero RN (oncoming nurse) by AYESHA Dale (offgoing nurse). Report included the following information Nurse Handoff Report, Index, Surgery Report, Intake/Output, MAR, Recent Results, and Cardiac Rhythm Sinus braulio-NSR .

## 2023-08-24 NOTE — CONSULTS
Raúl at [address], [address] [place] [address]  W: [phone]  F: [phone]    Reason for Evaluation:   Lisa Patel is a 77 y.o. male with a history of prostate cancer who is seen in consultation at the request of Dr. Samina Johnston for evaluation of neutropenia/pancytopenia. History of Present Illness:   Lisa Patel is a pleasant 77 y.o. male who presents today for evaluation of pancytopenia. Patient presented with abdominal pain, distention, and inability to tolerate  PO. He has had significant weight loss. CT A/P showed massive gastric distention with findings suspicious for gastric wall emphysema. He underwent EGD which showed a friable mass in the duodenum. Biopsy on 8/15/23 showed nonspecific duodenitis with benign, reactive lymphoid hyperplasia. No evidence of increased intraepithelial lymphocytes, active inflammation, dysplasia, or malignancy. He is currently on TPN. He is status post ex lap and gastrojejunostomy placement 8/23. Intra-op small bowel peritoneal implant seen. Frozen biopsy confirms carcinoma. Labs this admission notable for leukopenia, macrocytic anemia, and thrombocytopenia. Most recent labs WBC 1.6, ANC 1.0, hemoglobin 8.4, , platelets 80. Other work up with normal B12, normal folate, elevated ferritin 745, low TIBC. He has a history of prostate cancer and history of lung cancer. Her is a poor historian but tells me that he had his prostate cancer treated last year with surgery and that his lung cancer resected in February 2023. CT CAP 8/10/23 shows left upper lobectomy, emphysematous changes of the lung, and small volume ascites in addition to the massive gastric distention. Social History:   Lives alone. Former smoker, quit 3 years ago. No EtOH use. Review of systems was obtained and pertinent findings reviewed above.  Past medical history, social history, family history, medications, and allergies are will follow along. Thank you for involving me in the care of this patient.     Signed By: Lelo Mckee MD

## 2023-08-24 NOTE — PLAN OF CARE
Problem: Discharge Planning  Goal: Discharge to home or other facility with appropriate resources  Outcome: Progressing     Problem: Skin/Tissue Integrity  Goal: Absence of new skin breakdown  Description: 1. Monitor for areas of redness and/or skin breakdown  2. Assess vascular access sites hourly  3. Every 4-6 hours minimum:  Change oxygen saturation probe site  4. Every 4-6 hours:  If on nasal continuous positive airway pressure, respiratory therapy assess nares and determine need for appliance change or resting period.   Outcome: Progressing     Problem: Safety - Adult  Goal: Free from fall injury  Outcome: Progressing     Problem: Cardiovascular - Adult  Goal: Maintains optimal cardiac output and hemodynamic stability  Outcome: Progressing  Goal: Absence of cardiac dysrhythmias or at baseline  Outcome: Progressing     Problem: Gastrointestinal - Adult  Goal: Minimal or absence of nausea and vomiting  Outcome: Progressing  Goal: Maintains or returns to baseline bowel function  Outcome: Progressing  Goal: Maintains adequate nutritional intake  Outcome: Progressing  Goal: Establish and maintain optimal ostomy function  Outcome: Progressing     Problem: Metabolic/Fluid and Electrolytes - Adult  Goal: Electrolytes maintained within normal limits  Outcome: Progressing  Goal: Hemodynamic stability and optimal renal function maintained  Outcome: Progressing  Goal: Glucose maintained within prescribed range  Outcome: Progressing

## 2023-08-25 LAB
ALBUMIN SERPL-MCNC: 2.2 G/DL (ref 3.5–5)
ALBUMIN/GLOB SERPL: 0.7 (ref 1.1–2.2)
ALP SERPL-CCNC: 66 U/L (ref 45–117)
ALT SERPL-CCNC: 77 U/L (ref 12–78)
ANION GAP SERPL CALC-SCNC: 1 MMOL/L (ref 5–15)
AST SERPL-CCNC: 43 U/L (ref 15–37)
BASOPHILS # BLD: 0 K/UL (ref 0–0.1)
BASOPHILS NFR BLD: 0 % (ref 0–1)
BILIRUB DIRECT SERPL-MCNC: 0.3 MG/DL (ref 0–0.2)
BILIRUB SERPL-MCNC: 0.6 MG/DL (ref 0.2–1)
BUN SERPL-MCNC: 29 MG/DL (ref 6–20)
BUN/CREAT SERPL: 48 (ref 12–20)
CALCIUM SERPL-MCNC: 8.6 MG/DL (ref 8.5–10.1)
CHLORIDE SERPL-SCNC: 108 MMOL/L (ref 97–108)
CO2 SERPL-SCNC: 28 MMOL/L (ref 21–32)
CREAT SERPL-MCNC: 0.6 MG/DL (ref 0.7–1.3)
DIFFERENTIAL METHOD BLD: ABNORMAL
EOSINOPHIL # BLD: 0 K/UL (ref 0–0.4)
EOSINOPHIL NFR BLD: 0 % (ref 0–7)
ERYTHROCYTE [DISTWIDTH] IN BLOOD BY AUTOMATED COUNT: 14.5 % (ref 11.5–14.5)
GLOBULIN SER CALC-MCNC: 3.2 G/DL (ref 2–4)
GLUCOSE BLD STRIP.AUTO-MCNC: 154 MG/DL (ref 65–117)
GLUCOSE BLD STRIP.AUTO-MCNC: 161 MG/DL (ref 65–117)
GLUCOSE BLD STRIP.AUTO-MCNC: 72 MG/DL (ref 65–117)
GLUCOSE SERPL-MCNC: 100 MG/DL (ref 65–100)
HAPTOGLOB SERPL-MCNC: 119 MG/DL (ref 30–200)
HBV SURFACE AB SER QL: NONREACTIVE
HBV SURFACE AB SER-ACNC: 5.31 MIU/ML
HBV SURFACE AG SER QL: <0.1 INDEX
HBV SURFACE AG SER QL: NEGATIVE
HCT VFR BLD AUTO: 23.4 % (ref 36.6–50.3)
HCV AB SER IA-ACNC: >11 INDEX
HCV AB SERPL QL IA: REACTIVE
HGB BLD-MCNC: 7.8 G/DL (ref 12.1–17)
HIV 1+2 AB+HIV1 P24 AG SERPL QL IA: NONREACTIVE
HIV 1/2 RESULT COMMENT: NORMAL
IMM GRANULOCYTES # BLD AUTO: 0 K/UL
IMM GRANULOCYTES NFR BLD AUTO: 0 %
LDH SERPL L TO P-CCNC: 480 U/L (ref 85–241)
LYMPHOCYTES # BLD: 0.4 K/UL (ref 0.8–3.5)
LYMPHOCYTES NFR BLD: 21 % (ref 12–49)
MCH RBC QN AUTO: 35.3 PG (ref 26–34)
MCHC RBC AUTO-ENTMCNC: 33.3 G/DL (ref 30–36.5)
MCV RBC AUTO: 105.9 FL (ref 80–99)
MONOCYTES # BLD: 0.2 K/UL (ref 0–1)
MONOCYTES NFR BLD: 10 % (ref 5–13)
NEUTS BAND NFR BLD MANUAL: 3 % (ref 0–6)
NEUTS SEG # BLD: 1.5 K/UL (ref 1.8–8)
NEUTS SEG NFR BLD: 66 % (ref 32–75)
NRBC # BLD: 0 K/UL (ref 0–0.01)
NRBC BLD-RTO: 0 PER 100 WBC
PERIPHERAL SMEAR, MD REVIEW: NORMAL
PHOSPHATE SERPL-MCNC: 3.6 MG/DL (ref 2.6–4.7)
PLATELET # BLD AUTO: 71 K/UL (ref 150–400)
PLATELET COMMENT: ABNORMAL
PMV BLD AUTO: 12.3 FL (ref 8.9–12.9)
POTASSIUM SERPL-SCNC: 4.6 MMOL/L (ref 3.5–5.1)
PROT SERPL-MCNC: 5.4 G/DL (ref 6.4–8.2)
RBC # BLD AUTO: 2.21 M/UL (ref 4.1–5.7)
RBC MORPH BLD: ABNORMAL
RBC MORPH BLD: ABNORMAL
SERVICE CMNT-IMP: ABNORMAL
SERVICE CMNT-IMP: ABNORMAL
SERVICE CMNT-IMP: NORMAL
SODIUM SERPL-SCNC: 137 MMOL/L (ref 136–145)
WBC # BLD AUTO: 2.1 K/UL (ref 4.1–11.1)

## 2023-08-25 PROCEDURE — 51798 US URINE CAPACITY MEASURE: CPT

## 2023-08-25 PROCEDURE — 2580000003 HC RX 258: Performed by: SURGERY

## 2023-08-25 PROCEDURE — 6370000000 HC RX 637 (ALT 250 FOR IP): Performed by: INTERNAL MEDICINE

## 2023-08-25 PROCEDURE — 87340 HEPATITIS B SURFACE AG IA: CPT

## 2023-08-25 PROCEDURE — 6360000002 HC RX W HCPCS: Performed by: SURGERY

## 2023-08-25 PROCEDURE — 2060000000 HC ICU INTERMEDIATE R&B

## 2023-08-25 PROCEDURE — 36415 COLL VENOUS BLD VENIPUNCTURE: CPT

## 2023-08-25 PROCEDURE — 83615 LACTATE (LD) (LDH) ENZYME: CPT

## 2023-08-25 PROCEDURE — 84255 ASSAY OF SELENIUM: CPT

## 2023-08-25 PROCEDURE — 87389 HIV-1 AG W/HIV-1&-2 AB AG IA: CPT

## 2023-08-25 PROCEDURE — 99024 POSTOP FOLLOW-UP VISIT: CPT | Performed by: SURGERY

## 2023-08-25 PROCEDURE — 86803 HEPATITIS C AB TEST: CPT

## 2023-08-25 PROCEDURE — 51701 INSERT BLADDER CATHETER: CPT

## 2023-08-25 PROCEDURE — 82962 GLUCOSE BLOOD TEST: CPT

## 2023-08-25 PROCEDURE — 85025 COMPLETE CBC W/AUTO DIFF WBC: CPT

## 2023-08-25 PROCEDURE — 84100 ASSAY OF PHOSPHORUS: CPT

## 2023-08-25 PROCEDURE — 82525 ASSAY OF COPPER: CPT

## 2023-08-25 PROCEDURE — 83010 ASSAY OF HAPTOGLOBIN QUANT: CPT

## 2023-08-25 PROCEDURE — 80048 BASIC METABOLIC PNL TOTAL CA: CPT

## 2023-08-25 PROCEDURE — 86704 HEP B CORE ANTIBODY TOTAL: CPT

## 2023-08-25 PROCEDURE — 86706 HEP B SURFACE ANTIBODY: CPT

## 2023-08-25 PROCEDURE — 2500000003 HC RX 250 WO HCPCS: Performed by: SURGERY

## 2023-08-25 PROCEDURE — 80076 HEPATIC FUNCTION PANEL: CPT

## 2023-08-25 PROCEDURE — 6370000000 HC RX 637 (ALT 250 FOR IP): Performed by: SURGERY

## 2023-08-25 RX ORDER — ACETAMINOPHEN 500 MG
1000 TABLET ORAL EVERY 6 HOURS PRN
Status: DISCONTINUED | OUTPATIENT
Start: 2023-08-25 | End: 2023-08-28 | Stop reason: HOSPADM

## 2023-08-25 RX ADMIN — FAMOTIDINE: 10 INJECTION, SOLUTION INTRAVENOUS at 19:00

## 2023-08-25 RX ADMIN — I.V. FAT EMULSION 250 ML: 20 EMULSION INTRAVENOUS at 19:05

## 2023-08-25 RX ADMIN — TAMSULOSIN HYDROCHLORIDE 0.4 MG: 0.4 CAPSULE ORAL at 21:49

## 2023-08-25 RX ADMIN — ACETAMINOPHEN 1000 MG: 500 TABLET ORAL at 01:40

## 2023-08-25 RX ADMIN — TAMSULOSIN HYDROCHLORIDE 0.4 MG: 0.4 CAPSULE ORAL at 08:47

## 2023-08-25 RX ADMIN — ACETAMINOPHEN 1000 MG: 500 TABLET ORAL at 23:44

## 2023-08-25 RX ADMIN — SODIUM CHLORIDE, PRESERVATIVE FREE 10 ML: 5 INJECTION INTRAVENOUS at 08:47

## 2023-08-25 RX ADMIN — SODIUM CHLORIDE, PRESERVATIVE FREE 10 ML: 5 INJECTION INTRAVENOUS at 21:49

## 2023-08-25 RX ADMIN — ACETAMINOPHEN 1000 MG: 500 TABLET ORAL at 06:38

## 2023-08-25 RX ADMIN — POTASSIUM PHOSPHATE, MONOBASIC POTASSIUM PHOSPHATE, DIBASIC 15 MMOL: 224; 236 INJECTION, SOLUTION, CONCENTRATE INTRAVENOUS at 22:58

## 2023-08-25 RX ADMIN — HYDROMORPHONE HYDROCHLORIDE 2 MG: 2 INJECTION, SOLUTION INTRAMUSCULAR; INTRAVENOUS; SUBCUTANEOUS at 06:42

## 2023-08-25 RX ADMIN — POTASSIUM PHOSPHATE, MONOBASIC POTASSIUM PHOSPHATE, DIBASIC 15 MMOL: 224; 236 INJECTION, SOLUTION, CONCENTRATE INTRAVENOUS at 09:26

## 2023-08-25 ASSESSMENT — PAIN DESCRIPTION - ORIENTATION
ORIENTATION: ANTERIOR
ORIENTATION: MID

## 2023-08-25 ASSESSMENT — PAIN DESCRIPTION - DESCRIPTORS
DESCRIPTORS: SORE
DESCRIPTORS: ACHING

## 2023-08-25 ASSESSMENT — PAIN - FUNCTIONAL ASSESSMENT: PAIN_FUNCTIONAL_ASSESSMENT: PREVENTS OR INTERFERES SOME ACTIVE ACTIVITIES AND ADLS

## 2023-08-25 ASSESSMENT — PAIN SCALES - GENERAL
PAINLEVEL_OUTOF10: 7
PAINLEVEL_OUTOF10: 10

## 2023-08-25 ASSESSMENT — PAIN DESCRIPTION - LOCATION
LOCATION: ABDOMEN
LOCATION: ABDOMEN

## 2023-08-25 NOTE — PROGRESS NOTES
2300: Pt has not voided since 5 pm when he had straight cath done, this nurse went to bladder scan pt but he declined . 0430:Patient now agrees to have bladder scan done .   5577: Straight cath performed  got 800 mls urine

## 2023-08-25 NOTE — PROGRESS NOTES
Mr. Bayron Fernandez has no specific c/o today. More alert. Tm 98.8 Tc 98.1 HR: 87 BP: 86/64 Resp Rate: 16 93% sat on room air. Intake/Output Summary (Last 24 hours) at 8/25/2023 0913  Last data filed at 8/25/2023 0523  Gross per 24 hour   Intake 10 ml   Output 1600 ml   Net -1590 ml   Exam: Cor: RRR. Lungs: Bilateral breath sounds. Abd: Soft. Non distended. Non tender. No guarding or rebound. The surgical incision is clean.    Labs:   Recent Results (from the past 12 hour(s))   POCT Glucose    Collection Time: 08/25/23 12:11 AM   Result Value Ref Range    POC Glucose 154 (H) 65 - 117 mg/dL    Performed by: Vaughn Spain    Basic Metabolic Panel    Collection Time: 08/25/23  4:11 AM   Result Value Ref Range    Sodium 137 136 - 145 mmol/L    Potassium 4.6 3.5 - 5.1 mmol/L    Chloride 108 97 - 108 mmol/L    CO2 28 21 - 32 mmol/L    Anion Gap 1 (L) 5 - 15 mmol/L    Glucose 100 65 - 100 mg/dL    BUN 29 (H) 6 - 20 MG/DL    Creatinine 0.60 (L) 0.70 - 1.30 MG/DL    Bun/Cre Ratio 48 (H) 12 - 20      Est, Glom Filt Rate >60 >60 ml/min/1.73m2    Calcium 8.6 8.5 - 10.1 MG/DL   CBC with Auto Differential    Collection Time: 08/25/23  4:11 AM   Result Value Ref Range    WBC 2.1 (L) 4.1 - 11.1 K/uL    RBC 2.21 (L) 4.10 - 5.70 M/uL    Hemoglobin 7.8 (L) 12.1 - 17.0 g/dL    Hematocrit 23.4 (L) 36.6 - 50.3 %    .9 (H) 80.0 - 99.0 FL    MCH 35.3 (H) 26.0 - 34.0 PG    MCHC 33.3 30.0 - 36.5 g/dL    RDW 14.5 11.5 - 14.5 %    Platelets 71 (L) 503 - 400 K/uL    MPV 12.3 8.9 - 12.9 FL    Nucleated RBCs 0.0 0  WBC    nRBC 0.00 0.00 - 0.01 K/uL    Neutrophils % 66 32 - 75 %    Band Neutrophils 3 0 - 6 %    Lymphocytes % 21 12 - 49 %    Monocytes % 10 5 - 13 %    Eosinophils % 0 0 - 7 %    Basophils % 0 0 - 1 %    Immature Granulocytes 0 %    Neutrophils Absolute 1.5 (L) 1.8 - 8.0 K/UL    Lymphocytes Absolute 0.4 (L) 0.8 - 3.5 K/UL    Monocytes Absolute 0.2 0.0 - 1.0 except ice chips and meds for now. Will renew TPN and lipids. Medical Oncology input - noted. OOB, ambulate. Anti-emetics and pain medication as ordered. Plans per Dr. Afshan Delaney.

## 2023-08-25 NOTE — PROGRESS NOTES
Hospitalist Progress Note  Manjit Morales MD  Answering service: [phone]        Date of Service:  2023  NAME:  Leonila Chiang  :  1955  MRN:  [id number]      Admission Summary:     Patient transferred from [facility] with gastric outlet obstruction and gastric emphysema. Interval history / Subjective:      Had AUR yest/today requiring I&O x2  Calm, oriented x3  NAD  AFVSS     Assessment & Plan:     Gastric emphysema  -presented with abdominal pain, distention, inability to tolerate p.o. and significant weight loss  -Initial CT abdomen and pelvis shows massive gastric distention with findings suspicious for gastric wall emphysema  -Follow-up upper GI series with mildly dilated stomach, no barium seen passing through the pylorus  -S/p EGD shows friable mass in the duodenum, s/p biopsy, pathology negative for malignancy  -GI and general surgery following, also palliative care following  -Continuing TPN, remain NPO except ice chips and meds per surgery  -s/p exploratory laparotomy and gastrojejunostomy     Acute metabolic encephalopathy , resolved  Nonfocal, likely r/t postop, anesthesia  CT head nothing acute    Hypotension, resolved  -unclear etiology, no other signs or symptoms of sepsis    Sinus pauses  -Cardiology evaluated the patient and no cardiac work-up or intervention was recommended    Pancytopenia  -with anemia of chronic inflammation  -appreciate heme/onc assistance: labs ordered  -B12, folate wnl    H/o prostate ca  History of lung cancer  -Patient follows with oncology at [facility]    Hypernatremia  -Resolved    BPH  -resumed home Flomax     AUR likely r/t above  -will need hernandez if retains again    Severe protein calorie malnutrition  -Continue TPN, nutrition following       Code status: Full  Prophylaxis: SCDs d/t pancytopenia  Care Plan discussed with: Patient, RN, CM  Anticipated Disposition: >48h       Review of Systems:   Pertinent items are noted in HPI. Vital Signs:    Last 24hrs VS reviewed since prior progress note. Most recent are:  Vitals:    08/25/23 1200   BP:    Pulse: 73   Resp:    Temp:    SpO2:          Intake/Output Summary (Last 24 hours) at 8/25/2023 1227  Last data filed at 8/25/2023 2691  Gross per 24 hour   Intake 10 ml   Output 1200 ml   Net -1190 ml          Physical Examination:     I had a face to face encounter with this patient and independently examined them on 8/25/2023 as outlined below:          General : alert, awake, NAD  Chest: Clear to auscultation bilaterally   CVS: RRR  Abd: soft/ non tender, non distended  Ext: no clubbing, no cyanosis, no edema  Neuro/Psych: A&Ox3, LEWIS  Skin: warm         Data Review:    Review and/or order of clinical lab test    I have independently reviewed and interpreted patient's lab and all other diagnostic data    Notes reviewed from all clinical/nonclinical/nursing services involved in patient's clinical care. Care coordination discussions were held with appropriate clinical/nonclinical/ nursing providers based on care coordination needs. Labs:     Recent Labs     08/24/23  0425 08/25/23  0411   WBC 1.6* 2.1*   HGB 8.4* 7.8*   HCT 25.2* 23.4*   PLT 80* 71*       Recent Labs     08/23/23  0313 08/24/23  0425 08/25/23  0411    143 137   K HEMOLYZED SPECIMEN 4.6 4.6   * 111* 108   CO2 27 26 28   BUN 21* 24* 29*   MG  --  2.0  --    PHOS 3.7 2.8 3.6       Recent Labs     08/23/23  0313 08/25/23  0411   * 77   GLOB 2.7 3.2       No results for input(s): INR, APTT in the last 72 hours. Invalid input(s): PTP   No results for input(s): TIBC, FERR in the last 72 hours. Invalid input(s): FE, PSAT   No results found for: FOL, RBCF   No results for input(s): PH, PCO2, PO2 in the last 72 hours. No results for input(s): CPK in the last 72 hours.     Invalid input(s): VERÓNICA,

## 2023-08-26 LAB
ANION GAP SERPL CALC-SCNC: 4 MMOL/L (ref 5–15)
BUN SERPL-MCNC: 22 MG/DL (ref 6–20)
BUN/CREAT SERPL: 58 (ref 12–20)
CALCIUM SERPL-MCNC: 8.2 MG/DL (ref 8.5–10.1)
CHLORIDE SERPL-SCNC: 110 MMOL/L (ref 97–108)
CO2 SERPL-SCNC: 24 MMOL/L (ref 21–32)
CREAT SERPL-MCNC: 0.38 MG/DL (ref 0.7–1.3)
ERYTHROCYTE [DISTWIDTH] IN BLOOD BY AUTOMATED COUNT: 14.7 % (ref 11.5–14.5)
GLUCOSE BLD STRIP.AUTO-MCNC: 117 MG/DL (ref 65–117)
GLUCOSE BLD STRIP.AUTO-MCNC: 128 MG/DL (ref 65–117)
GLUCOSE BLD STRIP.AUTO-MCNC: 140 MG/DL (ref 65–117)
GLUCOSE SERPL-MCNC: 115 MG/DL (ref 65–100)
HBV CORE AB SERPL QL IA: POSITIVE
HCT VFR BLD AUTO: 19 % (ref 36.6–50.3)
HGB BLD-MCNC: 6.3 G/DL (ref 12.1–17)
MCH RBC QN AUTO: 35.4 PG (ref 26–34)
MCHC RBC AUTO-ENTMCNC: 33.2 G/DL (ref 30–36.5)
MCV RBC AUTO: 106.7 FL (ref 80–99)
NRBC # BLD: 0 K/UL (ref 0–0.01)
NRBC BLD-RTO: 0 PER 100 WBC
PHOSPHATE SERPL-MCNC: 3.4 MG/DL (ref 2.6–4.7)
PLATELET # BLD AUTO: 63 K/UL (ref 150–400)
PMV BLD AUTO: 12.9 FL (ref 8.9–12.9)
POTASSIUM SERPL-SCNC: 4.2 MMOL/L (ref 3.5–5.1)
RBC # BLD AUTO: 1.78 M/UL (ref 4.1–5.7)
RETICS # AUTO: 0.03 M/UL (ref 0.03–0.1)
RETICS/RBC NFR AUTO: 1.6 % (ref 0.7–2.1)
SERVICE CMNT-IMP: ABNORMAL
SERVICE CMNT-IMP: ABNORMAL
SERVICE CMNT-IMP: NORMAL
SODIUM SERPL-SCNC: 138 MMOL/L (ref 136–145)
WBC # BLD AUTO: 2.2 K/UL (ref 4.1–11.1)

## 2023-08-26 PROCEDURE — 2580000003 HC RX 258: Performed by: SURGERY

## 2023-08-26 PROCEDURE — 6370000000 HC RX 637 (ALT 250 FOR IP): Performed by: INTERNAL MEDICINE

## 2023-08-26 PROCEDURE — 99024 POSTOP FOLLOW-UP VISIT: CPT | Performed by: SURGERY

## 2023-08-26 PROCEDURE — 51702 INSERT TEMP BLADDER CATH: CPT

## 2023-08-26 PROCEDURE — 6360000002 HC RX W HCPCS: Performed by: SURGERY

## 2023-08-26 PROCEDURE — 30233N1 TRANSFUSION OF NONAUTOLOGOUS RED BLOOD CELLS INTO PERIPHERAL VEIN, PERCUTANEOUS APPROACH: ICD-10-PCS | Performed by: INTERNAL MEDICINE

## 2023-08-26 PROCEDURE — 80048 BASIC METABOLIC PNL TOTAL CA: CPT

## 2023-08-26 PROCEDURE — 94760 N-INVAS EAR/PLS OXIMETRY 1: CPT

## 2023-08-26 PROCEDURE — 36415 COLL VENOUS BLD VENIPUNCTURE: CPT

## 2023-08-26 PROCEDURE — 2500000003 HC RX 250 WO HCPCS: Performed by: SURGERY

## 2023-08-26 PROCEDURE — 82962 GLUCOSE BLOOD TEST: CPT

## 2023-08-26 PROCEDURE — 85027 COMPLETE CBC AUTOMATED: CPT

## 2023-08-26 PROCEDURE — 2060000000 HC ICU INTERMEDIATE R&B

## 2023-08-26 PROCEDURE — 85045 AUTOMATED RETICULOCYTE COUNT: CPT

## 2023-08-26 PROCEDURE — 6370000000 HC RX 637 (ALT 250 FOR IP): Performed by: SURGERY

## 2023-08-26 PROCEDURE — 84100 ASSAY OF PHOSPHORUS: CPT

## 2023-08-26 RX ORDER — SODIUM CHLORIDE 9 MG/ML
INJECTION, SOLUTION INTRAVENOUS PRN
Status: DISCONTINUED | OUTPATIENT
Start: 2023-08-26 | End: 2023-08-26

## 2023-08-26 RX ADMIN — POTASSIUM PHOSPHATE, MONOBASIC POTASSIUM PHOSPHATE, DIBASIC 15 MMOL: 224; 236 INJECTION, SOLUTION, CONCENTRATE INTRAVENOUS at 21:54

## 2023-08-26 RX ADMIN — FAMOTIDINE: 10 INJECTION, SOLUTION INTRAVENOUS at 18:10

## 2023-08-26 RX ADMIN — HYDROMORPHONE HYDROCHLORIDE 2 MG: 2 INJECTION, SOLUTION INTRAMUSCULAR; INTRAVENOUS; SUBCUTANEOUS at 18:08

## 2023-08-26 RX ADMIN — TAMSULOSIN HYDROCHLORIDE 0.4 MG: 0.4 CAPSULE ORAL at 21:47

## 2023-08-26 RX ADMIN — SODIUM CHLORIDE, PRESERVATIVE FREE 10 ML: 5 INJECTION INTRAVENOUS at 09:18

## 2023-08-26 RX ADMIN — TAMSULOSIN HYDROCHLORIDE 0.4 MG: 0.4 CAPSULE ORAL at 09:18

## 2023-08-26 RX ADMIN — SODIUM CHLORIDE, PRESERVATIVE FREE 10 ML: 5 INJECTION INTRAVENOUS at 15:25

## 2023-08-26 RX ADMIN — POTASSIUM PHOSPHATE, MONOBASIC POTASSIUM PHOSPHATE, DIBASIC 15 MMOL: 224; 236 INJECTION, SOLUTION, CONCENTRATE INTRAVENOUS at 11:51

## 2023-08-26 RX ADMIN — SODIUM CHLORIDE, PRESERVATIVE FREE 10 ML: 5 INJECTION INTRAVENOUS at 21:49

## 2023-08-26 RX ADMIN — OXYCODONE HYDROCHLORIDE 5 MG: 5 TABLET ORAL at 23:15

## 2023-08-26 RX ADMIN — OXYCODONE HYDROCHLORIDE 5 MG: 5 TABLET ORAL at 06:56

## 2023-08-26 RX ADMIN — HYDROMORPHONE HYDROCHLORIDE 2 MG: 2 INJECTION, SOLUTION INTRAMUSCULAR; INTRAVENOUS; SUBCUTANEOUS at 11:51

## 2023-08-26 RX ADMIN — I.V. FAT EMULSION 250 ML: 20 EMULSION INTRAVENOUS at 18:10

## 2023-08-26 RX ADMIN — OXYCODONE HYDROCHLORIDE 10 MG: 5 TABLET ORAL at 14:23

## 2023-08-26 RX ADMIN — OXYCODONE HYDROCHLORIDE 5 MG: 5 TABLET ORAL at 02:42

## 2023-08-26 RX ADMIN — SODIUM CHLORIDE, PRESERVATIVE FREE 10 ML: 5 INJECTION INTRAVENOUS at 21:50

## 2023-08-26 RX ADMIN — ACETAMINOPHEN 1000 MG: 500 TABLET ORAL at 21:47

## 2023-08-26 RX ADMIN — HYDROMORPHONE HYDROCHLORIDE 2 MG: 2 INJECTION, SOLUTION INTRAMUSCULAR; INTRAVENOUS; SUBCUTANEOUS at 09:17

## 2023-08-26 RX ADMIN — HYDROMORPHONE HYDROCHLORIDE 2 MG: 2 INJECTION, SOLUTION INTRAMUSCULAR; INTRAVENOUS; SUBCUTANEOUS at 15:25

## 2023-08-26 ASSESSMENT — PAIN SCALES - GENERAL
PAINLEVEL_OUTOF10: 4
PAINLEVEL_OUTOF10: 7
PAINLEVEL_OUTOF10: 9
PAINLEVEL_OUTOF10: 8
PAINLEVEL_OUTOF10: 10
PAINLEVEL_OUTOF10: 8
PAINLEVEL_OUTOF10: 8
PAINLEVEL_OUTOF10: 6
PAINLEVEL_OUTOF10: 8
PAINLEVEL_OUTOF10: 8
PAINLEVEL_OUTOF10: 7
PAINLEVEL_OUTOF10: 8
PAINLEVEL_OUTOF10: 2

## 2023-08-26 ASSESSMENT — PAIN DESCRIPTION - FREQUENCY
FREQUENCY: INTERMITTENT
FREQUENCY: INTERMITTENT

## 2023-08-26 ASSESSMENT — PAIN DESCRIPTION - LOCATION
LOCATION: ABDOMEN
LOCATION: ABDOMEN
LOCATION: PENIS
LOCATION: ABDOMEN
LOCATION: FLANK
LOCATION: ABDOMEN

## 2023-08-26 ASSESSMENT — PAIN DESCRIPTION - PAIN TYPE
TYPE: SURGICAL PAIN
TYPE: SURGICAL PAIN

## 2023-08-26 ASSESSMENT — PAIN DESCRIPTION - DESCRIPTORS
DESCRIPTORS: SHARP;ACHING
DESCRIPTORS: SHARP
DESCRIPTORS: ACHING;TENDER
DESCRIPTORS: ACHING
DESCRIPTORS: STABBING
DESCRIPTORS: STABBING
DESCRIPTORS: ACHING
DESCRIPTORS: SHARP
DESCRIPTORS: BURNING

## 2023-08-26 ASSESSMENT — PAIN DESCRIPTION - ORIENTATION
ORIENTATION: RIGHT
ORIENTATION: LOWER
ORIENTATION: LOWER
ORIENTATION: LEFT
ORIENTATION: RIGHT
ORIENTATION: UPPER;MID;ANTERIOR
ORIENTATION: RIGHT
ORIENTATION: UPPER;MID;ANTERIOR

## 2023-08-26 ASSESSMENT — PAIN - FUNCTIONAL ASSESSMENT
PAIN_FUNCTIONAL_ASSESSMENT: ACTIVITIES ARE NOT PREVENTED

## 2023-08-26 ASSESSMENT — PAIN DESCRIPTION - ONSET
ONSET: ON-GOING
ONSET: ON-GOING

## 2023-08-26 NOTE — PROGRESS NOTES
Bedside shift change report given to Espinoza Bal RN (oncoming nurse) by AYESHA Dale (offgoing nurse). Report included the following information Nurse Handoff Report, Index, Intake/Output, MAR, Recent Results, and Cardiac Rhythm Sinus [id number] .

## 2023-08-26 NOTE — PROGRESS NOTES
Hospitalist Progress Note  Ilir Mehta MD  Answering service: [phone]        Date of Service:  2023  NAME:  Bridger Hammonds  :  1955  MRN:  [id number]      Admission Summary:     Patient transferred from [facility] with gastric outlet obstruction and gastric emphysema. Interval history / Subjective:     Recurrent AUR yesterday, now s/p hernandez catheter  No acute complaints  NAD  AFVSS  Hgb dropped from 7.8 to 6.3 today, pt asymptomatic     Assessment & Plan:     Gastric emphysema  -presented with abdominal pain, distention, inability to tolerate p.o. and significant weight loss  -Initial CT abdomen and pelvis shows massive gastric distention with findings suspicious for gastric wall emphysema  -Follow-up upper GI series with mildly dilated stomach, no barium seen passing through the pylorus  -S/p EGD shows friable mass in the duodenum, s/p biopsy, pathology negative for malignancy  -GI and general surgery following, also palliative care following  -Continuing TPN, remain NPO except ice chips and meds per surgery  -s/p exploratory laparotomy and gastrojejunostomy     Pancytopenia  Acute on chronic anemia of chronic inflammation  -B12, folate wnl  -appreciate heme/onc assistance: labs ordered- HIV NR, LDH elev, Haptoglobin wnl, Hep C Ab reactive, Hep B Core Ab positive, Hep B SA neg; copper, selenium pending  Smear: \"Macrocytic anemia with anisopoikilocytosis. Schistocytes are not increased. No nucleated red blood cells. Leukocytes are few limiting morphologic assessment. Neutrophils are left shifted to the band stage; blasts are not identified. Lymphocytes and monocytes without overt morphologic abnormality.  Thrombocytopenia\"  -hgb has been downtrending and now dropped from 7.8 to 6.3 today, no s/sx of bleeding  -transfusion was recommended but pt does not consent to blood

## 2023-08-26 NOTE — CARE COORDINATION
RUR: 16%  Prior Level of Functioning: independent with family/friends support. Patient lives alone. Disposition: tbd     Is patient a  and [facility] with [facility]? yes  If yes, was [employer] transfer form completed and [facility] notified? Yes 8/17/23 [facility] notified. Caregiver Contact:   Discharge Caregiver contacted prior to discharge? Care Conference needed? Barriers to discharge:      Chart was review. Mr. Claudia Toure is NPO. He has a hernandez. His hemoglobin dropped. He is on TPN. Will continue to follow for discharge planning.   Maine Vasquez LCSW
Transition of Care Plan:    RUR: 14%  Prior Level of Functioning: independent with increasing weakness over past 6 months. Patient reports losing 70 lbs. The patient is known to the [facility] ([address] Disposition: return home (with his dogs). And TBD  Patient had an EGD and noted blockage. Mass indicating  Possible Cancer    Patient threatening to leave earlier today as he has been NPO since his admission here. CM spoke with patient re concerns for him leaving when his work-up was not completed. CM discussed strong possibility of him being admitted to a hospital ([address] [place] [address]) and having to start another hospitalization. Note:  The patient lives in [place], [place]. Patient agreed to stay the night. CM witnessed patient calling his friend (Gerald Bryant) and telling him not to come tonight. Ray asked about tomorrow and CM advised that we will call him when patient is ready for d/c.     CM received consult for transfer to [facility]. CM will call in the morning. It is doubtful that [facility] will accept patient this late into his hospitalization. CM will continue to follow.     Rabia Rojas, [address] [facility], [address] [place] [address]
Transition of Care Plan:    RUR: 16%  Prior Level of Functioning: independent with family/friends support. Patient lives alone. Disposition: tbd    Is patient a [place] and connected with [facility]? yes  If yes, was [employer] transfer form completed and [facility] notified? Yes 8/17/23 [facility] notified. Caregiver Contact:   Discharge Caregiver contacted prior to discharge? Care Conference needed? Barriers to discharge:      CM called [facility] ([place], [place] [phone] ext. CM faxed clinicals to the [place] at [phone]. CM continuing to follow.     Vicky Saavedra, [address] [facility], [address] [place] [address]
Manager  [phone]

## 2023-08-26 NOTE — PLAN OF CARE
Problem: Discharge Planning  Goal: Discharge to home or other facility with appropriate resources  Outcome: Progressing  Flowsheets (Taken 8/26/2023 0752 by Jim Iqbal, RN)  Discharge to home or other facility with appropriate resources:   Identify barriers to discharge with patient and caregiver   Identify discharge learning needs (meds, wound care, etc)     Problem: Skin/Tissue Integrity  Goal: Absence of new skin breakdown  Description: 1. Monitor for areas of redness and/or skin breakdown  2. Assess vascular access sites hourly  3. Every 4-6 hours minimum:  Change oxygen saturation probe site  4. Every 4-6 hours:  If on nasal continuous positive airway pressure, respiratory therapy assess nares and determine need for appliance change or resting period.   Outcome: Progressing     Problem: Safety - Adult  Goal: Free from fall injury  Outcome: Progressing     Problem: Cardiovascular - Adult  Goal: Maintains optimal cardiac output and hemodynamic stability  Outcome: Progressing  Flowsheets (Taken 8/26/2023 0752 by Jim Iqbal, RN)  Maintains optimal cardiac output and hemodynamic stability:   Monitor blood pressure and heart rate   Monitor urine output and notify Licensed Independent Practitioner for values outside of normal range   Assess for signs of decreased cardiac output   Administer fluid and/or volume expanders as ordered   Administer vasoactive medications as ordered  Goal: Absence of cardiac dysrhythmias or at baseline  Outcome: Progressing  Flowsheets (Taken 8/26/2023 0752 by Jim Iqbal, RN)  Absence of cardiac dysrhythmias or at baseline:   Monitor cardiac rate and rhythm   Assess for signs of decreased cardiac output   Administer antiarrhythmia medication and electrolyte replacement as ordered     Problem: Gastrointestinal - Adult  Goal: Minimal or absence of nausea and vomiting  Outcome: Progressing  Flowsheets (Taken 8/26/2023 0752 by Jim Iqbal, RN)  Minimal or absence of nausea and

## 2023-08-27 VITALS
SYSTOLIC BLOOD PRESSURE: 107 MMHG | RESPIRATION RATE: 16 BRPM | OXYGEN SATURATION: 100 % | WEIGHT: 113 LBS | TEMPERATURE: 98.1 F | DIASTOLIC BLOOD PRESSURE: 69 MMHG | HEIGHT: 72 IN | BODY MASS INDEX: 15.31 KG/M2 | HEART RATE: 62 BPM

## 2023-08-27 LAB
ANION GAP SERPL CALC-SCNC: 3 MMOL/L (ref 5–15)
BUN SERPL-MCNC: 19 MG/DL (ref 6–20)
BUN/CREAT SERPL: 44 (ref 12–20)
CALCIUM SERPL-MCNC: 7.8 MG/DL (ref 8.5–10.1)
CHLORIDE SERPL-SCNC: 110 MMOL/L (ref 97–108)
CO2 SERPL-SCNC: 25 MMOL/L (ref 21–32)
CREAT SERPL-MCNC: 0.43 MG/DL (ref 0.7–1.3)
ERYTHROCYTE [DISTWIDTH] IN BLOOD BY AUTOMATED COUNT: 14.7 % (ref 11.5–14.5)
GLUCOSE BLD STRIP.AUTO-MCNC: 131 MG/DL (ref 65–117)
GLUCOSE SERPL-MCNC: 131 MG/DL (ref 65–100)
HCT VFR BLD AUTO: 20.2 % (ref 36.6–50.3)
HGB BLD-MCNC: 6.7 G/DL (ref 12.1–17)
HISTORY CHECK: NORMAL
MCH RBC QN AUTO: 35.8 PG (ref 26–34)
MCHC RBC AUTO-ENTMCNC: 33.2 G/DL (ref 30–36.5)
MCV RBC AUTO: 108 FL (ref 80–99)
NRBC # BLD: 0 K/UL (ref 0–0.01)
NRBC BLD-RTO: 0 PER 100 WBC
PHOSPHATE SERPL-MCNC: 3.8 MG/DL (ref 2.6–4.7)
PLATELET # BLD AUTO: 66 K/UL (ref 150–400)
PMV BLD AUTO: 12.9 FL (ref 8.9–12.9)
POTASSIUM SERPL-SCNC: 4.5 MMOL/L (ref 3.5–5.1)
RBC # BLD AUTO: 1.87 M/UL (ref 4.1–5.7)
SERVICE CMNT-IMP: ABNORMAL
SODIUM SERPL-SCNC: 138 MMOL/L (ref 136–145)
WBC # BLD AUTO: 1.6 K/UL (ref 4.1–11.1)

## 2023-08-27 PROCEDURE — 2500000003 HC RX 250 WO HCPCS: Performed by: SURGERY

## 2023-08-27 PROCEDURE — 2580000003 HC RX 258: Performed by: SURGERY

## 2023-08-27 PROCEDURE — 2060000000 HC ICU INTERMEDIATE R&B

## 2023-08-27 PROCEDURE — 99024 POSTOP FOLLOW-UP VISIT: CPT | Performed by: SURGERY

## 2023-08-27 PROCEDURE — 6360000002 HC RX W HCPCS: Performed by: SURGERY

## 2023-08-27 PROCEDURE — 6370000000 HC RX 637 (ALT 250 FOR IP): Performed by: SURGERY

## 2023-08-27 PROCEDURE — P9016 RBC LEUKOCYTES REDUCED: HCPCS

## 2023-08-27 PROCEDURE — 86850 RBC ANTIBODY SCREEN: CPT

## 2023-08-27 PROCEDURE — 86900 BLOOD TYPING SEROLOGIC ABO: CPT

## 2023-08-27 PROCEDURE — 86923 COMPATIBILITY TEST ELECTRIC: CPT

## 2023-08-27 PROCEDURE — 86901 BLOOD TYPING SEROLOGIC RH(D): CPT

## 2023-08-27 PROCEDURE — 80048 BASIC METABOLIC PNL TOTAL CA: CPT

## 2023-08-27 PROCEDURE — 84100 ASSAY OF PHOSPHORUS: CPT

## 2023-08-27 PROCEDURE — 85027 COMPLETE CBC AUTOMATED: CPT

## 2023-08-27 PROCEDURE — 36430 TRANSFUSION BLD/BLD COMPNT: CPT

## 2023-08-27 PROCEDURE — 82962 GLUCOSE BLOOD TEST: CPT

## 2023-08-27 PROCEDURE — 6370000000 HC RX 637 (ALT 250 FOR IP): Performed by: INTERNAL MEDICINE

## 2023-08-27 PROCEDURE — 36415 COLL VENOUS BLD VENIPUNCTURE: CPT

## 2023-08-27 RX ORDER — SODIUM CHLORIDE 9 MG/ML
INJECTION, SOLUTION INTRAVENOUS PRN
Status: DISCONTINUED | OUTPATIENT
Start: 2023-08-27 | End: 2023-08-28 | Stop reason: HOSPADM

## 2023-08-27 RX ADMIN — HYDROMORPHONE HYDROCHLORIDE 2 MG: 2 INJECTION, SOLUTION INTRAMUSCULAR; INTRAVENOUS; SUBCUTANEOUS at 08:49

## 2023-08-27 RX ADMIN — SODIUM CHLORIDE, PRESERVATIVE FREE 10 ML: 5 INJECTION INTRAVENOUS at 20:51

## 2023-08-27 RX ADMIN — TAMSULOSIN HYDROCHLORIDE 0.4 MG: 0.4 CAPSULE ORAL at 20:50

## 2023-08-27 RX ADMIN — POTASSIUM PHOSPHATE, MONOBASIC POTASSIUM PHOSPHATE, DIBASIC 15 MMOL: 224; 236 INJECTION, SOLUTION, CONCENTRATE INTRAVENOUS at 08:50

## 2023-08-27 RX ADMIN — FAMOTIDINE: 10 INJECTION, SOLUTION INTRAVENOUS at 18:25

## 2023-08-27 RX ADMIN — HYDROMORPHONE HYDROCHLORIDE 2 MG: 2 INJECTION, SOLUTION INTRAMUSCULAR; INTRAVENOUS; SUBCUTANEOUS at 13:05

## 2023-08-27 RX ADMIN — SODIUM CHLORIDE, PRESERVATIVE FREE 10 ML: 5 INJECTION INTRAVENOUS at 08:50

## 2023-08-27 RX ADMIN — I.V. FAT EMULSION 250 ML: 20 EMULSION INTRAVENOUS at 18:26

## 2023-08-27 RX ADMIN — POTASSIUM PHOSPHATE, MONOBASIC POTASSIUM PHOSPHATE, DIBASIC 15 MMOL: 224; 236 INJECTION, SOLUTION, CONCENTRATE INTRAVENOUS at 20:51

## 2023-08-27 RX ADMIN — HYDROMORPHONE HYDROCHLORIDE 2 MG: 2 INJECTION, SOLUTION INTRAMUSCULAR; INTRAVENOUS; SUBCUTANEOUS at 20:50

## 2023-08-27 RX ADMIN — OXYCODONE HYDROCHLORIDE 10 MG: 5 TABLET ORAL at 17:15

## 2023-08-27 RX ADMIN — ACETAMINOPHEN 1000 MG: 500 TABLET ORAL at 20:49

## 2023-08-27 RX ADMIN — HYDROMORPHONE HYDROCHLORIDE 2 MG: 2 INJECTION, SOLUTION INTRAMUSCULAR; INTRAVENOUS; SUBCUTANEOUS at 16:01

## 2023-08-27 RX ADMIN — ACETAMINOPHEN 1000 MG: 500 TABLET ORAL at 03:22

## 2023-08-27 RX ADMIN — OXYCODONE HYDROCHLORIDE 5 MG: 5 TABLET ORAL at 05:55

## 2023-08-27 RX ADMIN — TAMSULOSIN HYDROCHLORIDE 0.4 MG: 0.4 CAPSULE ORAL at 08:50

## 2023-08-27 ASSESSMENT — PAIN DESCRIPTION - LOCATION
LOCATION: ABDOMEN

## 2023-08-27 ASSESSMENT — PAIN SCALES - GENERAL
PAINLEVEL_OUTOF10: 7
PAINLEVEL_OUTOF10: 8
PAINLEVEL_OUTOF10: 7
PAINLEVEL_OUTOF10: 9

## 2023-08-27 ASSESSMENT — PAIN DESCRIPTION - ORIENTATION
ORIENTATION: LEFT
ORIENTATION: UPPER;MID;ANTERIOR
ORIENTATION: UPPER;MID;ANTERIOR
ORIENTATION: RIGHT
ORIENTATION: ANTERIOR

## 2023-08-27 ASSESSMENT — PAIN DESCRIPTION - DESCRIPTORS
DESCRIPTORS: ACHING;SHARP
DESCRIPTORS: SHARP
DESCRIPTORS: SORE
DESCRIPTORS: STABBING
DESCRIPTORS: OTHER (COMMENT)

## 2023-08-27 ASSESSMENT — PAIN DESCRIPTION - ONSET
ONSET: ON-GOING

## 2023-08-27 ASSESSMENT — PAIN - FUNCTIONAL ASSESSMENT
PAIN_FUNCTIONAL_ASSESSMENT: ACTIVITIES ARE NOT PREVENTED

## 2023-08-27 ASSESSMENT — PAIN DESCRIPTION - FREQUENCY
FREQUENCY: INTERMITTENT
FREQUENCY: CONTINUOUS
FREQUENCY: INTERMITTENT

## 2023-08-27 ASSESSMENT — PAIN DESCRIPTION - PAIN TYPE
TYPE: SURGICAL PAIN

## 2023-08-27 NOTE — PROGRESS NOTES
0927: Several abnormal lab results. Will redraw for accuracy and notify ON NP.    0630: Lab redraw was not done as labs are trending with yesterdays. Pt refused blood transfusion on yesterday and is also refusing today. Pt given education on lab results and dire need for blood transfusion. Risks of blood refusal discussed with pt.    0709: Pt c/o muscle pain. Prn roxicodone administered 1 hour ago. Tylenol cannot be given as it is too soon. This nurse asked if any other assistance could be given to help alleviate pain or discomfort. Pt did not reply. This nurse asked again if there was anything that could be done to assist with pain relief. Pt replied, \"Don't worry about it. \"    7844: Pt has now agreed to get blood transfusion. Consent form has been signed and completed.

## 2023-08-27 NOTE — PROGRESS NOTES
Hospitalist Progress Note  Amina Phan MD  Answering service: [phone]        Date of Service:  2023  NAME:  Lisa Patel  :  1955  MRN:  [id number]      Admission Summary:     Patient transferred from [facility] with gastric outlet obstruction and gastric emphysema. Interval history / Subjective:     Pt now consents to blood transfusion  No acute complaints  Edilson CLD  AFVSS     Assessment & Plan:     Gastric emphysema  Gastric outlet obstruction  -presented with abdominal pain, distention, inability to tolerate p.o. and significant weight loss  -Initial CT abdomen and pelvis shows massive gastric distention with findings suspicious for gastric wall emphysema  -Follow-up upper GI series with mildly dilated stomach, no barium seen passing through the pylorus  -S/p EGD shows friable mass in the duodenum, s/p biopsy, pathology negative for malignancy  -GI and general surgery following, also palliative care following  -s/p exploratory laparotomy and gastrojejunostomy   -Continuing TPN, CLD per surgery    Pancytopenia  Acute on chronic anemia of chronic inflammation  -B12, folate wnl  -appreciate heme/onc: labs ordered- HIV NR, LDH elev, Haptoglobin wnl, Hep C Ab reactive, Hep B Core Ab positive, Hep B SA neg; copper, selenium pending  Smear: \"Macrocytic anemia with anisopoikilocytosis. Schistocytes are not increased. No nucleated red blood cells. Leukocytes are few limiting morphologic assessment. Neutrophils are left shifted to the band stage; blasts are not identified. Lymphocytes and monocytes without overt morphologic abnormality.  Thrombocytopenia\"  -hgb has been downtrending to 6.3 on , declined transfusion   -hgb 6.7 today, now consents to transfusion, transfuse 1 PRBC  -no s/sx active bleeding    Acute urinary retention  BPH  H/o prostate ca likely treated with ADT

## 2023-08-28 LAB
ABO + RH BLD: NORMAL
BLD PROD TYP BPU: NORMAL
BLOOD BANK DISPENSE STATUS: NORMAL
BLOOD GROUP ANTIBODIES SERPL: NORMAL
BPU ID: NORMAL
CROSSMATCH RESULT: NORMAL
GLUCOSE BLD STRIP.AUTO-MCNC: 111 MG/DL (ref 65–117)
SELENIUM SERPL-MCNC: 68 UG/L (ref 93–198)
SERVICE CMNT-IMP: NORMAL
SPECIMEN EXP DATE BLD: NORMAL
UNIT DIVISION: 0

## 2023-08-28 PROCEDURE — 0BH17EZ INSERTION OF ENDOTRACHEAL AIRWAY INTO TRACHEA, VIA NATURAL OR ARTIFICIAL OPENING: ICD-10-PCS | Performed by: INTERNAL MEDICINE

## 2023-08-28 PROCEDURE — 2500000003 HC RX 250 WO HCPCS: Performed by: INTERNAL MEDICINE

## 2023-08-28 PROCEDURE — 82962 GLUCOSE BLOOD TEST: CPT

## 2023-08-28 PROCEDURE — 6360000002 HC RX W HCPCS: Performed by: INTERNAL MEDICINE

## 2023-08-28 PROCEDURE — 5A12012 PERFORMANCE OF CARDIAC OUTPUT, SINGLE, MANUAL: ICD-10-PCS | Performed by: NURSE PRACTITIONER

## 2023-08-28 RX ORDER — EPINEPHRINE IN SOD CHLOR,ISO 1 MG/10 ML
SYRINGE (ML) INTRAVENOUS
Status: COMPLETED | OUTPATIENT
Start: 2023-08-28 | End: 2023-08-28

## 2023-08-28 RX ORDER — CALCIUM CHLORIDE 100 MG/ML
INJECTION INTRAVENOUS; INTRAVENTRICULAR
Status: COMPLETED | OUTPATIENT
Start: 2023-08-28 | End: 2023-08-28

## 2023-08-28 RX ORDER — LIDOCAINE HYDROCHLORIDE 20 MG/ML
INJECTION, SOLUTION INTRAVENOUS
Status: COMPLETED | OUTPATIENT
Start: 2023-08-28 | End: 2023-08-28

## 2023-08-28 RX ORDER — AMIODARONE HYDROCHLORIDE 50 MG/ML
INJECTION, SOLUTION INTRAVENOUS
Status: COMPLETED | OUTPATIENT
Start: 2023-08-28 | End: 2023-08-28

## 2023-08-28 RX ADMIN — SODIUM BICARBONATE 50 MEQ: 84 INJECTION, SOLUTION INTRAVENOUS at 01:05

## 2023-08-28 RX ADMIN — AMIODARONE HYDROCHLORIDE 150 MG: 50 INJECTION, SOLUTION INTRAVENOUS at 01:04

## 2023-08-28 RX ADMIN — EPINEPHRINE 1 MG: 0.1 INJECTION INTRACARDIAC; INTRAVENOUS at 00:46

## 2023-08-28 RX ADMIN — SODIUM BICARBONATE 50 MEQ: 84 INJECTION, SOLUTION INTRAVENOUS at 00:43

## 2023-08-28 RX ADMIN — EPINEPHRINE 1 MG: 0.1 INJECTION INTRACARDIAC; INTRAVENOUS at 00:40

## 2023-08-28 RX ADMIN — SODIUM BICARBONATE 50 MEQ: 84 INJECTION, SOLUTION INTRAVENOUS at 00:51

## 2023-08-28 RX ADMIN — EPINEPHRINE 1 MG: 0.1 INJECTION INTRACARDIAC; INTRAVENOUS at 01:01

## 2023-08-28 RX ADMIN — EPINEPHRINE 1 MG: 0.1 INJECTION INTRACARDIAC; INTRAVENOUS at 00:49

## 2023-08-28 RX ADMIN — EPINEPHRINE 1 MG: 0.1 INJECTION INTRACARDIAC; INTRAVENOUS at 00:58

## 2023-08-28 RX ADMIN — EPINEPHRINE 1 MG: 0.1 INJECTION INTRACARDIAC; INTRAVENOUS at 00:52

## 2023-08-28 RX ADMIN — EPINEPHRINE 1 MG: 0.1 INJECTION INTRACARDIAC; INTRAVENOUS at 00:55

## 2023-08-28 RX ADMIN — EPINEPHRINE 1 MG: 0.1 INJECTION INTRACARDIAC; INTRAVENOUS at 00:43

## 2023-08-28 RX ADMIN — LIDOCAINE HYDROCHLORIDE 100 MG: 20 INJECTION, SOLUTION INTRAVENOUS at 01:06

## 2023-08-28 RX ADMIN — CALCIUM CHLORIDE 500 MG: 100 INJECTION, SOLUTION INTRAVENOUS; INTRAVENTRICULAR at 00:51

## 2023-08-28 RX ADMIN — EPINEPHRINE 1 MG: 0.1 INJECTION INTRACARDIAC; INTRAVENOUS at 01:04

## 2023-08-28 RX ADMIN — EPINEPHRINE 1 MG: 0.1 INJECTION INTRACARDIAC; INTRAVENOUS at 01:07

## 2023-08-28 NOTE — PLAN OF CARE
Problem: Discharge Planning  Goal: Discharge to home or other facility with appropriate resources  8/28/2023 0009 by Steve Garcia RN  Outcome: Progressing  8/27/2023 2000 by Ari Schmidt RN  Outcome: Progressing  Flowsheets  Taken 8/27/2023 2000 by Steve Garcia RN  Discharge to home or other facility with appropriate resources:   Identify barriers to discharge with patient and caregiver   Arrange for needed discharge resources and transportation as appropriate   Identify discharge learning needs (meds, wound care, etc)   Refer to discharge planning if patient needs post-hospital services based on physician order or complex needs related to functional status, cognitive ability or social support system  Taken 8/27/2023 0849 by Ari Schmidt RN  Discharge to home or other facility with appropriate resources:   Identify barriers to discharge with patient and caregiver   Arrange for needed discharge resources and transportation as appropriate   Identify discharge learning needs (meds, wound care, etc)     Problem: Skin/Tissue Integrity  Goal: Absence of new skin breakdown  Description: 1. Monitor for areas of redness and/or skin breakdown  2. Assess vascular access sites hourly  3. Every 4-6 hours minimum:  Change oxygen saturation probe site  4. Every 4-6 hours:  If on nasal continuous positive airway pressure, respiratory therapy assess nares and determine need for appliance change or resting period.   8/28/2023 0009 by Steve Garcia RN  Outcome: Progressing  8/27/2023 2000 by Ari Schmidt RN  Outcome: Progressing     Problem: Safety - Adult  Goal: Free from fall injury  8/28/2023 0009 by Steve Garcia RN  Outcome: Progressing  8/27/2023 2000 by Ari Schmidt RN  Outcome: Progressing     Problem: Cardiovascular - Adult  Goal: Maintains optimal cardiac output and hemodynamic stability  8/28/2023 0009 by Steve Garcia RN  Outcome: Progressing  8/27/2023 2000 by Ari Schmidt RN  Outcome: patient on fluid and nutrition restrictions as appropriate  Taken 8/27/2023 0849 by Suyapa Baer RN  Hemodynamic stability and optimal renal function maintained:   Monitor labs and assess for signs and symptoms of volume excess or deficit   Monitor intake, output and patient weight   Monitor urine specific gravity, serum osmolarity and serum sodium as indicated or ordered   Monitor response to interventions for patient's volume status, including labs, urine output, blood pressure (other measures as available)  Goal: Glucose maintained within prescribed range  8/28/2023 0009 by Sawyer Shelton RN  Outcome: Progressing  8/27/2023 2000 by Suyapa Baer RN  Outcome: Progressing  Flowsheets  Taken 8/27/2023 2000 by Sawyer Shelton RN  Glucose maintained within prescribed range:   Monitor blood glucose as ordered   Assess for signs and symptoms of hyperglycemia and hypoglycemia   Administer ordered medications to maintain glucose within target range   Assess barriers to adequate nutritional intake and initiate nutrition consult as needed   Instruct patient on self management of diabetes and initiate consult as needed  Taken 8/27/2023 0849 by Suyapa Sinclair RN  Glucose maintained within prescribed range:   Monitor blood glucose as ordered   Assess for signs and symptoms of hyperglycemia and hypoglycemia   Administer ordered medications to maintain glucose within target range     Problem: Pain  Goal: Verbalizes/displays adequate comfort level or baseline comfort level  8/28/2023 0009 by Sawyer Shelton RN  Outcome: Progressing  Flowsheets (Taken 8/27/2023 2045)  Verbalizes/displays adequate comfort level or baseline comfort level:   Encourage patient to monitor pain and request assistance   Assess pain using appropriate pain scale   Administer analgesics based on type and severity of pain and evaluate response   Implement non-pharmacological measures as appropriate and evaluate response   Consider cultural and social

## 2023-08-28 NOTE — PLAN OF CARE
vomiting:   Administer IV fluids as ordered to ensure adequate hydration   Maintain NPO status until nausea and vomiting are resolved   Nasogastric tube to low intermittent suction as ordered   Administer ordered antiemetic medications as needed   Provide nonpharmacologic comfort measures as appropriate  Goal: Maintains or returns to baseline bowel function  Outcome: Progressing  Flowsheets (Taken 8/27/2023 0849)  Maintains or returns to baseline bowel function:   Assess bowel function   Encourage oral fluids to ensure adequate hydration   Administer IV fluids as ordered to ensure adequate hydration   Administer ordered medications as needed   Encourage mobilization and activity  Goal: Maintains adequate nutritional intake  Outcome: Progressing  Flowsheets (Taken 8/27/2023 0849)  Maintains adequate nutritional intake:   Monitor percentage of each meal consumed   Identify factors contributing to decreased intake, treat as appropriate   Assist with meals as needed   Monitor intake and output, weight and lab values  Goal: Establish and maintain optimal ostomy function  Outcome: Progressing  Flowsheets (Taken 8/27/2023 0849)  Establish and maintain optimal ostomy function:   Monitor output from ostomies   Infuse IV Fluids/TPN as ordered   Introduce and advance enteral feedings as ordered   Nutrition consult     Problem: Metabolic/Fluid and Electrolytes - Adult  Goal: Electrolytes maintained within normal limits  Outcome: Progressing  Flowsheets (Taken 8/27/2023 0849)  Electrolytes maintained within normal limits:   Monitor labs and assess patient for signs and symptoms of electrolyte imbalances   Administer electrolyte replacement as ordered   Monitor response to electrolyte replacements, including repeat lab results as appropriate  Goal: Hemodynamic stability and optimal renal function maintained  Outcome: Progressing  Flowsheets (Taken 8/27/2023 0849)  Hemodynamic stability and optimal renal function maintained:

## 2023-08-28 NOTE — PROGRESS NOTES
Notified by RN that patient has new onset confusion. ABG ordered and chart reviewed. Prior to arrival at bedside rapid response called that immediately turned into code blue. CPR started. ICU team arrived at bedside. Spoke with meena De Oliveira ([phone]) updated her on current condition and that CPR is currently ongoing. Questions answered. States to continue current measures and try to save him. She states that she will call her mother Salomón Trejo (secondary contact for patient) and will update her. Patient . Time of death 1:09am. Spoke with Xiomara De Oliveira ([phone]). Updated her that patient has . She states that she is unable to come to the hospital tonight.

## 2023-08-28 NOTE — PROGRESS NOTES
0020: Bed alarm going off. This nurse walked into pt room and saw pt undressing. Pt was asked what he was doing and he responded, \"I'm getting out of this stuff. I got to pee. \". This nurse explained to the pt a hernandez catheter was present and that he could pee while in bed. Pt became agitated and replied, \"I want this stuff off. \" Pt stood up and began pulling at leads/lines. At this time, this nurse asked pt to get back into bed. Pt began yelling at this nurse and trying to walk past bedside table. Code CAN was called. Secondary nurse entered the room for assistance. Pt ripped off leads and part of IJ tubing and removed hospital gown. Pt also tried to rip his hernandez out. Pt could not be redirected by primary nurse or by secondary nurse assisting with situation. Pt could not be placed back in bed and was verbally aggressive. Pt ambulated to restroom. Pt sat on toilet and began having a bowel movement. While on the toilet, this nurse asked the pt if he knew where he was. He responded, \"I know where I am. I know what's going on. I'm in [place]. \" Supervisor entered the room and began asking pt if he knew where he was. This nurse began taking vital signs. Pt was tachypneic and O2 saturation was 88% so NC was placed on pt at 4L/min. Supervisor said a sitter would be coming to keep an eye on pt. Pt O2 saturation came up to 92% but pt was still tachypneic. NRB was placed on pt. This nurse asked pt again if he knew where he was. He again responded, \"Im in [place]. \" This nurse and secondary nurse assisted pt back to bed for evaluation. Pt would not respond to questions or sternum rub. Sitter entered the room. This nurse asked sitter to call RRT.     Gaurang Jaime: Pt meena Oneal called and asked what happened. Situation was explained and niece thanked this nurse and said, \"I really appreciate you all for trying to help him. I just want to tell you that. \" Niece said she was unable to drive as she lived in another city and

## 2023-08-28 NOTE — PROCEDURES
Procedure Note - Intubation:   Performed by Danette Garsia MD .     Diagnosis: Cardiac arrest  Insertion Date: 8/28/2023 Time:12:45 am   Obtained Consent? no; emergent   Procedure Location:  Telemetry    Immediately prior to the procedure, the patient was reevaluated and found suitable for the planned procedure and any planned medications. Immediately prior to the procedure a time out was called to verify the correct patient, procedure, equipment, staff, and marking as appropriate. Preoxygenation applied via Ambu bag   Medications given were None. A number 7.5 cuffed   ETT was placed to 24 cm at the teeth. Placement was evaluated by noting good end-tidal CO2 detector color change . Attempts required: 1. Complications: none. A follow-up chest x-ray was not ordered post procedure. Patient passed away during code.       Danette Garsia MD  Pulmonary Critical Care Medicine  [facility]

## 2023-08-28 NOTE — DISCHARGE SUMMARY
Death Discharge Summary     PATIENT ID: Rosalino Ybarra  MRN: [id number]   YOB: 1955    DATE OF ADMISSION: 8/10/2023 11:13 PM    PRIMARY CARE PROVIDER: None None   ATTENDING PHYSICIAN: MIRANDA Joseph NP  CONSULTATIONS:   IP CONSULT TO GENERAL SURGERY  IP CONSULT TO GI  IP CONSULT TO DIETITIAN  IP CONSULT TO CASE MANAGEMENT  IP CONSULT TO PALLIATIVE CARE  IP CONSULT TO ANESTHESIOLOGY  IP CONSULT TO CARDIOLOGY  IP CONSULT TO HEMATOLOGY    PROCEDURES/SURGERIES:   Procedure(s):  EXPLORATORY LAPAROTOMY GASTROJEJUNOSTOMY    REASON FOR ADMISSION: Gastric emphysema     HOSPITAL PROBLEM LIST:  Patient Active Problem List   Diagnosis    Gastric emphysema    Severe protein-calorie malnutrition ([address])    Palliative care by specialist    Bradycardia    Pancytopenia ([address])    Carcinoma ([address])       DATE AND TIME OF DEATH: 08/28/2023 @ 0109    CODE STATUS AT DISCHARGE:  X Full Code    DNR    Partial    Comfort Care     DISCHARGE DIAGNOSES: Cardiopulmonary arrest     Brief HPI and Hospital Course:      Per H&P Rosalino Ybarra is a 77 y.o. male past medical history of COPD, and chronic vomiting for robbin past month. In the ED, he was bradycardic to 40. Other VSS. Labs showed negative rapid covid, WBC 2.9, macrocytic anemia 104.1, K+ 2.2, CO2 40, and UA with many epi's, WBC, adnd 4+ bacteria. Culture pending. CT abdomen/pelvis showed massive gastric distension with suspicion for gastric wall emphysema suggestive of ischemia, and extensive portal venous gas.        In the ED, gen surgery was consulted, and NG tube placed\"     Gastric emphysema  Gastric outlet obstruction  -presented with abdominal pain, distention, inability to tolerate p.o. and significant weight loss  -Initial CT abdomen and pelvis shows massive gastric distention with findings suspicious for gastric wall emphysema  -Follow-up upper GI series with mildly dilated stomach, no barium seen passing through the pylorus  -S/p EGD shows friable mass in the duodenum, s/p biopsy, pathology negative for malignancy  -GI and general surgery following, also palliative care following  -s/p exploratory laparotomy and gastrojejunostomy 8/23  -Continuing TPN, CLD per surgery     Pancytopenia  Acute on chronic anemia of chronic inflammation  -B12, folate wnl  -appreciate heme/onc: labs ordered- HIV NR, LDH elev, Haptoglobin wnl, Hep C Ab reactive, Hep B Core Ab positive, Hep B SA neg; copper, selenium pending  Smear: \"Macrocytic anemia with anisopoikilocytosis. Schistocytes are not increased. No nucleated red blood cells. Leukocytes are few limiting morphologic assessment. Neutrophils are left shifted to the band stage; blasts are not identified. Lymphocytes and monocytes without overt morphologic abnormality. Thrombocytopenia\"  -hgb has been downtrending to 6.3 on 8/26, declined transfusion 8/26  -hgb 6.7 today, now consents to transfusion, transfuse 1 PRBC  -no s/sx active bleeding     Acute urinary retention  BPH  H/o prostate ca likely treated with ADT + radiation  -resumed home Flomax 8/24  -required hernandez placement 8/26  -will need OP f/up with Urology for VT     Severe protein calorie malnutrition  -Continue TPN, nutrition following     Acute metabolic encephalopathy 0/99, resolved  Nonfocal, likely r/t postop, anesthesia  CT head nothing acute     Hypotension, resolved  -unclear etiology, no other signs or symptoms of sepsis     Sinus pauses  -Cardiology evaluated the patient and no cardiac work-up or intervention was recommended     History of lung cancer  -Patient follows with oncology at [facility]     Hypernatremia  -Resolved    08/28/2023:   Notified by RN that patient has new onset confusion. ABG ordered and chart reviewed. Prior to arrival at bedside rapid response called that immediately turned into code blue. CPR started. ICU team arrived at bedside.       Spoke with niece Mayi Mario ([phone]) updated her on current condition and that

## 2023-08-28 NOTE — CODE DOCUMENTATION
0037 - Rapid Response  Rapid response room 443 called at this time. RRT responding. Code Blue called upon arrival & assessment, no pulse. Checked in with primary RN prior to leaving. Opportunity for questions and concerns provided. Sepsis Screening  Are two or more SIRS criteria present? Is the patient's history suggestive of a new infection? No        Code Blue  Code Blue room 443 called at 0039. RRT responding. 0037 -Rapid Response called on pt d/t concerns of hypoxia. 5689 -Upon entering pt's room, RRT assessing pt, no pulse found and Code Blue immediately called. ACLS protocol follwed. CPR, defibrillation, ACLS meds given. Pt intubated & IO placed. See Code Blue Narrator. At bedside: RRT, Intensivist, Hospitallist NP, Nursing Supervisors, ICU Charge RN, Respiratory, primary and unit Rns.     TOD 0109    Checked with primary RN prior to leaving. Opportunity for questions and concerns provided. Assisted in paperwork following.

## 2023-08-28 NOTE — CODE DOCUMENTATION
CODE BLUE NOTE:    Initial Rhythm: PEA    Description of Code:    I responded to code blue page. Reportedly, patient walked to bathroom. Developed agitation. He pulled out 1. Right IJ central line. He developed cardiac arrest. CPR was initiated and conducted as per ACLS protocol. Initial rhythm was PEA. Patient received multiple rounds of CPR, epinephrine, bicarb. BS at 110. After 22 minutes, patient developed V-fib status post defibrillation X4. Pt received Amiodarone and lidocaine. After 28 minutes, he developed asystole. Patient was intubated. There was no problem bagging him. Despite high-quality CPR and pharmacological management ROSC was not achieved.     Code was called at 1:09 am.    Total duration of CPR: 30 minutes    Drugs: Epinephrine x 5    -Sodium bicarb for treatment of acidosis X3     - Amiodarone and lidocaine      Intubation: Performed by Mely Bangura MD    Result of Code: Death at 1:09 am    Next of kin notified: Yes    Primary attending notified: Yes      Mely Bangura MD  Pulmonary Critical Care Medicine

## 2023-08-30 LAB — COPPER SERPL-MCNC: 72 UG/DL (ref 69–132)

## 2023-09-07 NOTE — PROGRESS NOTES
Physician Progress Note      PATIENT:               Emily Hernandez  CSN #:                  [id number]  :                       1955  ADMIT DATE:       8/10/2023 11:13 PM  [address] [facility] DATE:        2023 1:09 AM  RESPONDING  PROVIDER #:        Gerianne Babinski MD          QUERY TEXT:    Cardiac arrest d/t concerns of hypoxia documented . If possible, please   document in progress notes and discharge summary the cause of cardiac arrest:    The medical record reflects the following:  Risk Factors: pmhx COPD, and chronic vomiting for the past month  Gastric emphysema, Gastric outlet obstruction,    Clinical Indicators: AMS, Hypoxic,-->  patient developed V-fib-->PEA, CO2 40,   K+ 2.2, initially presented with abdominal pain, distention, inability to   tolerate p.o. and significant weight loss,    Treatment: ACLS protocol followed. CPR, defibrillation, ACLS meds given. Pt   intubated & IO placed. - Amiodarone and lidocaine  -Sodium bicarb for treatment of acidosis X3  -Epinephrine x 5  Options provided:  -- Cardiac Arrest due to AMI  -- Cardiac Arrest due to VFib  -- Cardiac Arrest due to acute CVA  -- Cardiac Arrest due to Acute Respiratory Failure  -- Cardiac Arrest due to Acute Pulmonary Embolism  -- Other - I will add my own diagnosis  -- Disagree - Not applicable / Not valid  -- Disagree - Clinically unable to determine / Unknown  -- Refer to Clinical Documentation Reviewer    PROVIDER RESPONSE TEXT:    This patient had cardiac arrest due to acute respiratory failure. Query created by: Partha Ledezma on 2023 7:20 AM      QUERY TEXT:    Patient admitted with abdominal pain and vomiting. Noted documentation of   Gastric Emphysema/Gastric outlet obstruction documented. In order to support   the diagnosis of Gastric Emphysema, please include additional clinical   indicators in your documentation.   Or please document if the diagnosis can be   further specified as: Gastric outlet obstruction    The

## 2023-09-07 NOTE — PROGRESS NOTES
Physician Progress Note      PATIENT:               Marlee Rubi  CSN #:                  [id number]  :                       1955  ADMIT DATE:       8/10/2023 11:13 PM  [address] [facility] DATE:        2023 1:09 AM  RESPONDING  PROVIDER #:        Cecilia Davis NP          QUERY TEXT:    Cardiac arrest d/t concerns of hypoxia documented . If possible, please   document in progress notes and discharge summary the cause of cardiac arrest:    The medical record reflects the following:  Risk Factors: pmhx COPD, and chronic vomiting for the past month  Gastric emphysema, Gastric outlet obstruction,    Clinical Indicators: AMS, Hypoxic,-->  patient developed V-fib-->PEA, CO2 40,   K+ 2.2, initially presented with abdominal pain, distention, inability to   tolerate p.o. and significant weight loss,    Treatment: ACLS protocol followed. CPR, defibrillation, ACLS meds given. Pt   intubated & IO placed. - Amiodarone and lidocaine  -Sodium bicarb for treatment of acidosis X3  -Epinephrine x 5  Options provided:  -- Cardiac Arrest due to AMI  -- Cardiac Arrest due to VFib  -- Cardiac Arrest due to acute CVA  -- Cardiac Arrest due to Acute Respiratory Failure  -- Cardiac Arrest due to Acute Pulmonary Embolism  -- Other - I will add my own diagnosis  -- Disagree - Not applicable / Not valid  -- Disagree - Clinically unable to determine / Unknown  -- Refer to Clinical Documentation Reviewer    PROVIDER RESPONSE TEXT:    Provider is clinically unable to determine a response to this query. Query created by: Sol Oro on 2023 4:19 PM      QUERY TEXT:    Pt admitted with Gastric Emphysema. Pt noted to have acidosis documented . If possible, please document in the progress notes and discharge summary if   you are evaluating and/or treating any of the following:     The medical record reflects the following:  Risk Factors: pmhx COPD, and chronic vomiting for the past month  Gastric emphysema, Gastric outlet

## (undated) DEVICE — SUTURE VCRL 0 L27IN ABSRB UD CT L40MM 1/2 CIR TAPERPOINT J280H

## (undated) DEVICE — PENCIL SMK EVAC 10 FT BLADE ELECTRD ROCKER FOR TELSCP

## (undated) DEVICE — ELECTRODE PT RET AD L9FT HI MOIST COND ADH HYDRGEL CORDED

## (undated) DEVICE — SYRINGE MED 10ML LUERLOCK TIP W/O SFTY DISP

## (undated) DEVICE — GLOVE SURG SZ 8 L12IN FNGR THK94MIL STD WHT LTX FREE

## (undated) DEVICE — CLIP LIG M BLU TI HRT SHP WIRE HORZ 600 PER BX

## (undated) DEVICE — SURGICAL PROCEDURE PACK TISS 3X5 IN ABSORBABLE SEPRAFILM

## (undated) DEVICE — SUTURE MCRYL SZ 4-0 L27IN ABSRB UD L24MM PS-1 3/8 CIR PRIM Y935H

## (undated) DEVICE — TOWEL SURG W17XL27IN STD BLU COT NONFENESTRATED PREWASHED

## (undated) DEVICE — LIQUIBAND RAPID ADHESIVE 36/CS 0.8ML: Brand: MEDLINE

## (undated) DEVICE — APPLICATOR MEDICATED 26 CC SOLUTION HI LT ORNG CHLORAPREP

## (undated) DEVICE — SUTURE PDS II SZ 1 L36IN ABSRB VLT CT L40MM 1/2 CIR TAPR Z359T

## (undated) DEVICE — 1LYRTR 16FR10ML100%SIL UMS SNP: Brand: MEDLINE INDUSTRIES, INC.

## (undated) DEVICE — SUTURE ETHLN SZ 3-0 L18IN NONABSORBABLE BLK FS-1 L24MM 3/8 663H

## (undated) DEVICE — SUTURE PDS II SZ 2-0 L27IN ABSRB VLT SH L26MM 1/2 CIR Z317H

## (undated) DEVICE — SOLUTION IRRIG 1000ML 0.9% SOD CHL USP POUR PLAS BTL

## (undated) DEVICE — INTENT OT USE PROVIDES A STERILE INTERFACE BETWEEN THE OPERATING ROOM SURGICAL LAMPS (NON-STERILE) AND THE SURGEON OR STAFF WORKING IN THE STERILE FIELD.: Brand: ASPEN® ALC PLUS LIGHT HANDLE COVER

## (undated) DEVICE — PACK,LAPAROTOMY,2 REINFORCED GOWNS: Brand: MEDLINE

## (undated) DEVICE — BASIN ST MAJOR-NO CAUTERY: Brand: MEDLINE INDUSTRIES, INC.

## (undated) DEVICE — SUTURE PERMAHAND SZ 3-0 L30IN NONABSORBABLE BLK SILK BRAID A304H

## (undated) DEVICE — FORCEPS BX OVL CUP SERR DISP CAP L 240CM RAD JAW 4

## (undated) DEVICE — SUTURE PERMAHAND SZ 2-0 L18IN NONABSORBABLE BLK L26MM SH C012D

## (undated) DEVICE — SUTURE PERMAHAND SZ 2-0 L30IN NONABSORBABLE BLK SILK W/O A305H

## (undated) DEVICE — HANDPIECE LAP L23MM DIA5MM VES SEAL CUT CRV JAW PSTL GRP

## (undated) DEVICE — GLOVE ORANGE PI 8   MSG9080

## (undated) DEVICE — WOUND RETRACTOR AND PROTECTOR: Brand: ALEXIS WOUND PROTECTOR-RETRACTOR

## (undated) DEVICE — SUTURE PERMAHAND SZ 2-0 L30IN NONABSORBABLE BLK SH L26MM C016D

## (undated) DEVICE — GARMENT,MEDLINE,DVT,INT,CALF,MED, GEN2: Brand: MEDLINE

## (undated) DEVICE — Device

## (undated) DEVICE — SUTURE PERMAHAND SZ 0 L30IN NONABSORBABLE BLK SILK BRAID A306H

## (undated) DEVICE — SUTURE PERMAHAND SZ 3-0 L18IN NONABSORBABLE BLK L26MM SH C013D

## (undated) DEVICE — SOLUTION IRRIG 1000ML STRL H2O USP PLAS POUR BTL